# Patient Record
Sex: MALE | Race: BLACK OR AFRICAN AMERICAN | URBAN - METROPOLITAN AREA
[De-identification: names, ages, dates, MRNs, and addresses within clinical notes are randomized per-mention and may not be internally consistent; named-entity substitution may affect disease eponyms.]

---

## 2020-04-19 ENCOUNTER — EMERGENCY (EMERGENCY)
Facility: HOSPITAL | Age: 30
LOS: 1 days | Discharge: HOME | End: 2020-04-19
Attending: EMERGENCY MEDICINE | Admitting: EMERGENCY MEDICINE
Payer: COMMERCIAL

## 2020-04-19 VITALS
SYSTOLIC BLOOD PRESSURE: 108 MMHG | HEART RATE: 79 BPM | RESPIRATION RATE: 18 BRPM | OXYGEN SATURATION: 96 % | DIASTOLIC BLOOD PRESSURE: 57 MMHG | TEMPERATURE: 98 F

## 2020-04-19 VITALS
WEIGHT: 169.98 LBS | RESPIRATION RATE: 18 BRPM | TEMPERATURE: 98 F | SYSTOLIC BLOOD PRESSURE: 117 MMHG | OXYGEN SATURATION: 99 % | DIASTOLIC BLOOD PRESSURE: 76 MMHG | HEART RATE: 90 BPM | HEIGHT: 66 IN

## 2020-04-19 DIAGNOSIS — F43.20 ADJUSTMENT DISORDER, UNSPECIFIED: ICD-10-CM

## 2020-04-19 DIAGNOSIS — R45.851 SUICIDAL IDEATIONS: ICD-10-CM

## 2020-04-19 DIAGNOSIS — Z20.828 CONTACT WITH AND (SUSPECTED) EXPOSURE TO OTHER VIRAL COMMUNICABLE DISEASES: ICD-10-CM

## 2020-04-19 LAB
ALBUMIN SERPL ELPH-MCNC: 4.5 G/DL — SIGNIFICANT CHANGE UP (ref 3.3–5)
ALP SERPL-CCNC: 90 U/L — SIGNIFICANT CHANGE UP (ref 40–120)
ALT FLD-CCNC: 24 U/L — SIGNIFICANT CHANGE UP (ref 10–45)
AMPHET UR-MCNC: NEGATIVE — SIGNIFICANT CHANGE UP
ANION GAP SERPL CALC-SCNC: 13 MMOL/L — SIGNIFICANT CHANGE UP (ref 5–17)
APAP SERPL-MCNC: <5 UG/ML — LOW (ref 10–30)
APPEARANCE UR: CLEAR — SIGNIFICANT CHANGE UP
AST SERPL-CCNC: 28 U/L — SIGNIFICANT CHANGE UP (ref 10–40)
BARBITURATES UR SCN-MCNC: NEGATIVE — SIGNIFICANT CHANGE UP
BASOPHILS # BLD AUTO: 0.01 K/UL — SIGNIFICANT CHANGE UP (ref 0–0.2)
BASOPHILS NFR BLD AUTO: 0.2 % — SIGNIFICANT CHANGE UP (ref 0–2)
BENZODIAZ UR-MCNC: NEGATIVE — SIGNIFICANT CHANGE UP
BILIRUB SERPL-MCNC: 0.7 MG/DL — SIGNIFICANT CHANGE UP (ref 0.2–1.2)
BILIRUB UR-MCNC: NEGATIVE — SIGNIFICANT CHANGE UP
BUN SERPL-MCNC: 9 MG/DL — SIGNIFICANT CHANGE UP (ref 7–23)
CALCIUM SERPL-MCNC: 9.7 MG/DL — SIGNIFICANT CHANGE UP (ref 8.4–10.5)
CHLORIDE SERPL-SCNC: 102 MMOL/L — SIGNIFICANT CHANGE UP (ref 96–108)
CO2 SERPL-SCNC: 25 MMOL/L — SIGNIFICANT CHANGE UP (ref 22–31)
COCAINE METAB.OTHER UR-MCNC: NEGATIVE — SIGNIFICANT CHANGE UP
COLOR SPEC: YELLOW — SIGNIFICANT CHANGE UP
CREAT SERPL-MCNC: 0.8 MG/DL — SIGNIFICANT CHANGE UP (ref 0.5–1.3)
DIFF PNL FLD: NEGATIVE — SIGNIFICANT CHANGE UP
EOSINOPHIL # BLD AUTO: 0.19 K/UL — SIGNIFICANT CHANGE UP (ref 0–0.5)
EOSINOPHIL NFR BLD AUTO: 3.3 % — SIGNIFICANT CHANGE UP (ref 0–6)
ETHANOL SERPL-MCNC: <10 MG/DL — SIGNIFICANT CHANGE UP (ref 0–10)
GLUCOSE SERPL-MCNC: 98 MG/DL — SIGNIFICANT CHANGE UP (ref 70–99)
GLUCOSE UR QL: NEGATIVE — SIGNIFICANT CHANGE UP
HCT VFR BLD CALC: 44.8 % — SIGNIFICANT CHANGE UP (ref 39–50)
HGB BLD-MCNC: 15.4 G/DL — SIGNIFICANT CHANGE UP (ref 13–17)
IMM GRANULOCYTES NFR BLD AUTO: 0.2 % — SIGNIFICANT CHANGE UP (ref 0–1.5)
KETONES UR-MCNC: NEGATIVE — SIGNIFICANT CHANGE UP
LEUKOCYTE ESTERASE UR-ACNC: NEGATIVE — SIGNIFICANT CHANGE UP
LYMPHOCYTES # BLD AUTO: 1.97 K/UL — SIGNIFICANT CHANGE UP (ref 1–3.3)
LYMPHOCYTES # BLD AUTO: 34.3 % — SIGNIFICANT CHANGE UP (ref 13–44)
MCHC RBC-ENTMCNC: 30.3 PG — SIGNIFICANT CHANGE UP (ref 27–34)
MCHC RBC-ENTMCNC: 34.4 GM/DL — SIGNIFICANT CHANGE UP (ref 32–36)
MCV RBC AUTO: 88.2 FL — SIGNIFICANT CHANGE UP (ref 80–100)
METHADONE UR-MCNC: NEGATIVE — SIGNIFICANT CHANGE UP
MONOCYTES # BLD AUTO: 0.38 K/UL — SIGNIFICANT CHANGE UP (ref 0–0.9)
MONOCYTES NFR BLD AUTO: 6.6 % — SIGNIFICANT CHANGE UP (ref 2–14)
NEUTROPHILS # BLD AUTO: 3.19 K/UL — SIGNIFICANT CHANGE UP (ref 1.8–7.4)
NEUTROPHILS NFR BLD AUTO: 55.4 % — SIGNIFICANT CHANGE UP (ref 43–77)
NITRITE UR-MCNC: NEGATIVE — SIGNIFICANT CHANGE UP
NRBC # BLD: 0 /100 WBCS — SIGNIFICANT CHANGE UP (ref 0–0)
OPIATES UR-MCNC: NEGATIVE — SIGNIFICANT CHANGE UP
PCP SPEC-MCNC: SIGNIFICANT CHANGE UP
PCP UR-MCNC: NEGATIVE — SIGNIFICANT CHANGE UP
PH UR: 6.5 — SIGNIFICANT CHANGE UP (ref 5–8)
PLATELET # BLD AUTO: 257 K/UL — SIGNIFICANT CHANGE UP (ref 150–400)
POTASSIUM SERPL-MCNC: 3.9 MMOL/L — SIGNIFICANT CHANGE UP (ref 3.5–5.3)
POTASSIUM SERPL-SCNC: 3.9 MMOL/L — SIGNIFICANT CHANGE UP (ref 3.5–5.3)
PROT SERPL-MCNC: 7.4 G/DL — SIGNIFICANT CHANGE UP (ref 6–8.3)
PROT UR-MCNC: NEGATIVE MG/DL — SIGNIFICANT CHANGE UP
RBC # BLD: 5.08 M/UL — SIGNIFICANT CHANGE UP (ref 4.2–5.8)
RBC # FLD: 13.1 % — SIGNIFICANT CHANGE UP (ref 10.3–14.5)
SALICYLATES SERPL-MCNC: <0.3 MG/DL — LOW (ref 2.8–20)
SARS-COV-2 RNA SPEC QL NAA+PROBE: SIGNIFICANT CHANGE UP
SODIUM SERPL-SCNC: 140 MMOL/L — SIGNIFICANT CHANGE UP (ref 135–145)
SP GR SPEC: 1.01 — SIGNIFICANT CHANGE UP (ref 1–1.03)
THC UR QL: NEGATIVE — SIGNIFICANT CHANGE UP
UROBILINOGEN FLD QL: 0.2 E.U./DL — SIGNIFICANT CHANGE UP
WBC # BLD: 5.75 K/UL — SIGNIFICANT CHANGE UP (ref 3.8–10.5)
WBC # FLD AUTO: 5.75 K/UL — SIGNIFICANT CHANGE UP (ref 3.8–10.5)

## 2020-04-19 PROCEDURE — 80307 DRUG TEST PRSMV CHEM ANLYZR: CPT

## 2020-04-19 PROCEDURE — 81003 URINALYSIS AUTO W/O SCOPE: CPT

## 2020-04-19 PROCEDURE — 90792 PSYCH DIAG EVAL W/MED SRVCS: CPT | Mod: 95

## 2020-04-19 PROCEDURE — 85025 COMPLETE CBC W/AUTO DIFF WBC: CPT

## 2020-04-19 PROCEDURE — 99285 EMERGENCY DEPT VISIT HI MDM: CPT

## 2020-04-19 PROCEDURE — 80053 COMPREHEN METABOLIC PANEL: CPT

## 2020-04-19 PROCEDURE — 99053 MED SERV 10PM-8AM 24 HR FAC: CPT

## 2020-04-19 PROCEDURE — 36415 COLL VENOUS BLD VENIPUNCTURE: CPT

## 2020-04-19 PROCEDURE — 99285 EMERGENCY DEPT VISIT HI MDM: CPT | Mod: 25

## 2020-04-19 PROCEDURE — 96372 THER/PROPH/DIAG INJ SC/IM: CPT

## 2020-04-19 PROCEDURE — 93005 ELECTROCARDIOGRAM TRACING: CPT

## 2020-04-19 PROCEDURE — 87635 SARS-COV-2 COVID-19 AMP PRB: CPT

## 2020-04-19 PROCEDURE — 93010 ELECTROCARDIOGRAM REPORT: CPT

## 2020-04-19 RX ORDER — HALOPERIDOL DECANOATE 100 MG/ML
5 INJECTION INTRAMUSCULAR ONCE
Refills: 0 | Status: COMPLETED | OUTPATIENT
Start: 2020-04-19 | End: 2020-04-19

## 2020-04-19 RX ADMIN — HALOPERIDOL DECANOATE 5 MILLIGRAM(S): 100 INJECTION INTRAMUSCULAR at 05:00

## 2020-04-19 RX ADMIN — Medication 2 MILLIGRAM(S): at 05:08

## 2020-04-19 NOTE — ED BEHAVIORAL HEALTH ASSESSMENT NOTE - PSYCHIATRIC ISSUES AND PLAN (INCLUDE STANDING AND PRN MEDICATION)
consider SW consult to address housing issue. Unknown what he takes or whether he has been compliant. Can consider giving Risperdal 2MG PO once if pt consents and accepts it (avoid giving if pt receives any PRN antipsychotics in order to avoid side effects). If PRNs needed can consider Haldol 5MG q6h PRN agitation (PO/IM, keep QTC under 500), Ativan 2MG q6h PRN agitation (PO/IM), Benadryl 50MG q6h PRN EPS prevention (PO/IM) consider SW consult to address housing issue. Unknown what he takes or whether he has been compliant. Can consider giving Risperdal 2MG PO once if pt consents and accepts it (avoid giving if pt receives any PRN antipsychotics in order to avoid side effects) and cogentin 1MG PO once if pt consents and accepts. If PRNs needed can consider Haldol 5MG q6h PRN agitation (PO/IM, keep QTC under 500), Ativan 2MG q6h PRN agitation (PO/IM), Benadryl 50MG q6h PRN EPS prevention (PO/IM)

## 2020-04-19 NOTE — ED BEHAVIORAL HEALTH ASSESSMENT NOTE - HPI (INCLUDE ILLNESS QUALITY, SEVERITY, DURATION, TIMING, CONTEXT, MODIFYING FACTORS, ASSOCIATED SIGNS AND SYMPTOMS)
Pt is a 30yo M, undomiciled on the street, self reports PPhx schizophrenia, no known PMHx, BIB self with vague complaints and conflicting reports.     Interview difficult as pt refusing to answer many questions and complete the evaluation. Attempted several times, and also got on camera twice with both times pt refusing to answer further questions and refusing to complete the interview.     Pt reports he has had schizophrenia for several years. He reports that he came to the ER because he wanted paperwork to get disability. He reports having a psychiatrist but refusing to name psychiatrist, clinic, or location of clinic, or when his next appt is. He did report he saw the psychiatrist last time maybe a few weeks ago. He states he has no collateral contacts at all.   He denies depression. He denies current SI. He reports "food" as a protective factor and what he lives for. He denies SA or NSSI, but wouldn't clarify why he reported past SA to the ER nurse as per her note. He denies access to firearms or weapons.   He denies HI. He reports AH for the past few years, and says they have been the same. he denies CAH. He denies VH. He wouldn't really answer regarding PI, at first states no but then reports yes for past few years that's the same but wouldn't clarify the details.    He reports that he just wants to sleep in the bed because he had a hard time sleeping due to being homeless. When asked about his comment to ED team about wanting to sleep, he denies that being a suicidal statement and states he just wants to sleep in a bed. He reports wanting help to get shelter.     He refuses to answer any more questions and refuses to engage in the full assessment.

## 2020-04-19 NOTE — ED PROVIDER NOTE - PROGRESS NOTE DETAILS
pt was medically cleared for psychiatric eval. tele psych was consulted and will see the patient. Dispo pending psych eval. Caroline- pt uncooperative on multiple attempts at telepsych eval, now pacing around ED trying to leave.  code grey called and pt given haldol/ativan IM.  pt for psych hold/reeval Ree: pt received from JESE Velazquez and Dr. Lock at s/o; pt with c/o si though no plan. Given haldol/ ativan for agitation and now sleeping comfortably. Pt initially evaluated by telepsych and to be re-evaluated once more alert and awake. Will continue on 1:1 obs.

## 2020-04-19 NOTE — ED BEHAVIORAL HEALTH NOTE - BEHAVIORAL HEALTH NOTE
telepsych re-assessment at 10:45    Per initial evaluating psychiatrist, Dr. Ventura,   "Pt is a 30yo M, undomiciled on the street, self reports PPhx schizophrenia, no known PMHx, BIB self with vague complaints and conflicting reports. Interview difficult as pt refusing to answer many questions and complete the evaluation. Attempted several times, and also got on camera twice with both times pt refusing to answer further questions and refusing to complete the interview. Pt reports he has had schizophrenia for several years. He reports that he came to the ER because he wanted paperwork to get disability. He reports having a psychiatrist but refusing to name psychiatrist, clinic, or location of clinic, or when his next appt is. He did report he saw the psychiatrist last time maybe a few weeks ago. He states he has no collateral contacts at all.   He denies depression. He denies current SI. He reports "food" as a protective factor and what he lives for. He denies SA or NSSI, but wouldn't clarify why he reported past SA to the ER nurse as per her note. He denies access to firearms or weapons.   He denies HI. He reports AH for the past few years, and says they have been the same. he denies CAH. He denies VH. He wouldn't really answer regarding PI, at first states no but then reports yes for past few years that's the same but wouldn't clarify the details.He reports that he just wants to sleep in the bed because he had a hard time sleeping due to being homeless. When asked about his comment to ED team about wanting to sleep, he denies that being a suicidal statement and states he just wants to sleep in a bed. He reports wanting help to get shelter. He refuses to answer any more questions and refuses to engage in the full assessment."      Per PA, patient was medicated with haldol 5mg and Ativan 2mg IM injection around 5am due to irritability and has slept since and ate some breakfast prior to this telepsych re-assessment, and was sleeping for the most part after he got medication.  Patient was seen and evaluated, he is calm during assessment, appears to be sleepy, but is aaox3, able to answer questions.  He denied SI/HI/AVH at this time.  When confronted with giving inconsistent history since arrival to ED, he admits to only wanting a place to live and food to eat.  He denied paranoid, active psychosis, or vasu.  He is amendable to outpatient follow up and referral to shelter.    MSE:  Appearance:  stated age, fairly groomed, in hospital gown, medium build  Behavior:  cooperative  Eye contact:  fair  Gait and muscle strength:  deferred to ED provider  Movement:  no abnormal movement noted on evaluation  TP:  linear, goal directed, logical  TC:  denied SI/HI  Perceptual:  denied AVH, no delusion/psyhosis/PI noted on exam  Cognition:  aaox3  Intelligence:  average  Memory:  grossly intact  Insight:  fair on evaluation   Judgment:  fair on evaluation poor by history     Discharge diagnosis:  adjustment d/o unspecified, r/o substance use d/o and/or substance use induced mood/psychotic d/o    A/P  28 yo male with self reported history of schizophrenia, unclear h/o admission or psyhciatric treatment, denied prior SA/NSSIB/aggression, gave inconsistent history since arrival to ED.  Reports SI at triage, later, reported to various sources that he was seeking for shelter, food, or disability claim, and was not fully engaged in initial evaluation, medicated around 5am for irritability, pacing the hallway.  On re-assessment , patient is calm, aaox4, has no signs of self destructive behavior or agitation/psychosis/vasu, reports he does not have any SI/HI/AVH, but wants to get food and shelter.  He is advised that ED team may assist with above, patient denied any active substance use, is agreeable with getting outpatient psychiatric follow up information, and declined to participate in safety planning.   Patient does not meet criteria for inpatient admission at this time.    -Patient does not meet criteria for inpatient level of psychiatric care  - or ED team for possible shelter referral  -telepsych will send outpatient psychiatric resources for patient to follow up  -Advised to return to ED or call 911 for any suicidal ideation, homicidal thoughts or worsening symptoms. Patient cites understanding of instructions

## 2020-04-19 NOTE — ED PROVIDER NOTE - PATIENT PORTAL LINK FT
You can access the FollowMyHealth Patient Portal offered by [facility] by registering at the following website: [email or web address]. By joining [employer]’s FollowMyHealth portal, you will also be able to view your health information using other applications (apps) compatible with our system.

## 2020-04-19 NOTE — ED ADULT NURSE NOTE - REASON FOR REFERRAL
pt came into the ED stating wanting to hurt himself.  pt has denied any ideations of self harm.  pt states that he is homeless at this time.

## 2020-04-19 NOTE — ED BEHAVIORAL HEALTH ASSESSMENT NOTE - REASON FOR REFERRAL
Pt with mixed reports ( triage note includes SI, nurse note indicates pt denies SI but reports AH and reports compliance, provider notes noncompliance and desire to sleep)

## 2020-04-19 NOTE — ED PROVIDER NOTE - CLINICAL SUMMARY MEDICAL DECISION MAKING FREE TEXT BOX
29M with a h/o schizophrenia on Risperdal and cogentin, noncompliant x "a few weeks" who p/w suicidal thoughts and desire "to sleep." VSS, no focal neuro deficits or evidence of trauma. Pt is cooperative, placed on 1:1, labs and urine sent for psych clearance. Dispo pending telepsych eval. 29M with a h/o schizophrenia on Risperdal and cogentin, noncompliant x "a few weeks" who p/w suicidal thoughts and desire "to sleep." VSS, no focal neuro deficits or evidence of trauma. Pt is cooperative, placed on 1:1, labs and urine sent for psych clearance. Dispo pending telepsych eval.  Patient was eval by telepsych and clear to be discharge. No immediate threat to self or others. Pt is calm and cooperative.

## 2020-04-19 NOTE — ED BEHAVIORAL HEALTH ASSESSMENT NOTE - OTHER PAST PSYCHIATRIC HISTORY (INCLUDE DETAILS REGARDING ONSET, COURSE OF ILLNESS, INPATIENT/OUTPATIENT TREATMENT)
See HPI.   pt reports h/x of schizophrenia.   Pt refuses to answer any further questions, thus unknown whether has had any past psych admissions, and unknown who he is in outpatient with

## 2020-04-19 NOTE — ED PROVIDER NOTE - OBJECTIVE STATEMENT
29M with a h/o schizophrenia on Risperdal and cogentin, noncompliant x "a few weeks" who p/w suicidal thoughts and desire "to sleep." Pt states he has thoughts of hurting himself but does not have a plan. He states he has tried to hurt himself in the past but cannot provide details. He denies HI or hallucinations. he admits to drinking and taking an "e pill" today. No CP, SOB, n/v, ha, neck pain, f/c, or URI sx. no 29M with a h/o schizophrenia on Risperdal and cogentin, noncompliant x "a few weeks" who p/w suicidal thoughts and desire "to sleep." Pt states he has thoughts of hurting himself but does not have a plan. He states he has tried to hurt himself in the past but cannot provide details. He denies HI or hallucinations. He admits to drinking and taking an "e pill" today. No CP, SOB, n/v, ha, neck pain, f/c, or URI sx.

## 2020-04-19 NOTE — ED ADULT NURSE REASSESSMENT NOTE - CONDITION
improved/spoke with telepsych, consult will start momentarily, 1:1 maintained, given breakfast, pt denies any complaints at this time

## 2020-04-19 NOTE — ED BEHAVIORAL HEALTH ASSESSMENT NOTE - NS ED BHA BENZODIAZEPINES
MANTOUX TUBERCULIN (a.k.a. TB) SKIN TEST      What is Tuberculosis/TB?  Tuberculosis/TB is an infectious disease, which is spread through the air by tiny germs when people cough, sneeze, speak or sing. TB germs are very small and can remain in the air for a long period of time. TB germs most oft  en settle in your lungs, but may also settle in other organs of your body. TB germs can be present in your body without making you ill. This is called TB INFECTION. TB infection cannot be spread to other people. When your  body’s defenses become weak and can no longer control the TB germs they multiply, this is called TB DISEASE. It can take month(s) to year(s) for TB infection to become TB disease. The TB SKIN TEST can show if a person has  been “infected” by TB germs.    How is the Mantoux Tuberculin/TB skin test given?  A very small amount of a product called Purified Protein Derivative (PPD) is injected just under the top layer of the skin on the forearm. Persons who have been infected by the TB germs usually react to the test by developing swelling at the injection site. The skin test results must be read 48 to 72 hours after given. Failure to return within this time frame means the test will need to be repeated.    Side effects…  Side effects from a skin test are rare and may include itching and discomfort at the injection site and very rarely the possibility of a blister, ulceration or necrosis (dead tissue) at the site if the injection.  
None known

## 2020-04-19 NOTE — ED BEHAVIORAL HEALTH ASSESSMENT NOTE - CURRENT MEDICATION
unable to assess as pt refusing to answer questions.     Per chart review pt reported use of risperdal and cogentin (per nurses note pt reported compliance but per provider note pt reported noncompliance)

## 2020-04-19 NOTE — ED ADULT NURSE NOTE - HPI (INCLUDE ILLNESS QUALITY, SEVERITY, DURATION, TIMING, CONTEXT, MODIFYING FACTORS, ASSOCIATED SIGNS AND SYMPTOMS)
pt states that he as been hearing voices and that they (the voices in h is head) are saying bad things about himself. when asked if the pt had any ideations of self harm, pt denied any plans or thoughts of harming himself.  pt states that he has been taking his medications without missing any doses.  states increased voices over the past day or so.  when pt asked if he had any plans of harming himself or others, pt denied any plans at this time.  when talking to pt about his housing situation, pt states that he was homeless at this time.

## 2020-04-19 NOTE — ED PROVIDER NOTE - PHYSICAL EXAMINATION
GEN: Unkempt, appears homeless, Well developed, well nourished, awake, alert, oriented to person, place, time/situation and in no apparent distress. NTAF  ENT: Airway patent, Nasal mucosa clear. Mouth with normal mucosa.  EYES: injected conjunctiva bilaterally. PERRL, EOMI  RESPIRATORY: Breathing comfortably with normal RR. No W/C/R, no hypoxia or resp distress.  CARDIAC: Regular rate and rhythm, no M/R/G  ABDOMEN: Soft, nontender, +bowel sounds, no rebound, rigidity, or guarding.  MSK: Range of motion is not limited, no deformities noted.  NEURO: Alert and oriented, no focal deficits.  SKIN: Skin normal color for race, warm, dry and intact. No evidence of rash.  PSYCH: Alert and oriented to person, place, time/situation. flat mood and affect. no apparent risk to self or others.

## 2020-04-19 NOTE — ED ADULT NURSE REASSESSMENT NOTE - CONDITION
VSS, pt arousable and speaks 2-3 sentences but then falls back asleep, able to reposition self in bed; pt oriented x3; continue to pend telepsych consult/unchanged

## 2020-04-19 NOTE — ED BEHAVIORAL HEALTH ASSESSMENT NOTE - SUBSTANCE ISSUES AND PLAN (INCLUDE STANDING AND PRN MEDICATION)
defer to ED team. consider CIWA and monitoring for withdrawal, since per chart review pt mentioned drinking to provider but pt refused to answer questions regarding substances to me thus unknown how much he has been drinking or whether he is at risk of withdrawal

## 2020-04-19 NOTE — ED BEHAVIORAL HEALTH ASSESSMENT NOTE - SUMMARY
Pt is a 30yo M, undomiciled on the street, self reports PPhx schizophrenia, no known PMHx, BIB self with vague complaints and conflicting reports.     Currently denies SI/HI, but pt refusing to answer further questions and complete the full evaluation.   Although suspect possible malingering 2/2 homelessness, unable to determine full picture and give dispo given pt refusing to answer further questions despite multiple attempts.   Unfortunately will have to hold for reassessment. Pt is a 30yo M, undomiciled on the street, self reports PPhx schizophrenia, no known PMHx, BIB self with vague complaints and conflicting reports.     Currently denies SI/HI, but pt refusing to answer further questions and complete the full evaluation.   Although suspect possible malingering 2/2 homelessness, unable to determine full picture and give dispo given pt refusing to answer further questions despite multiple attempts. Pt also increasingly irritable on exam.   Unfortunately will have to hold for reassessment. Pt is a 30yo M, undomiciled on the street, self reports PPhx schizophrenia, no known PMHx, BIB self with vague complaints and conflicting reports.     Currently denies SI/HI, but pt refusing to answer further questions and complete the full evaluation.   Although suspect possible malingering 2/2 homelessness, unable to determine full picture and give dispo given pt refusing to answer further questions despite multiple attempts. Pt also increasingly irritable on exam.   Unclear why pt refusing to answer further questions and finish the evaluation (unclear if just due to pt wanting to sleep or leave and just not wanting to engage further, versus substance withdrawal leading to irritability, versus psychosis with PI).  Unfortunately will have to hold for reassessment.

## 2020-04-19 NOTE — ED BEHAVIORAL HEALTH ASSESSMENT NOTE - DESCRIPTION (FIRST USE, LAST USE, QUANTITY, FREQUENCY, DURATION)
unable to assess as pt refusing to answer questions. per chart review the provider note did note pt reporting ETOH use unable to assess as pt refusing to answer questions. per chart review the provider note did note pt reporting "e" pill use.. unable to determine whether has used anything else since he refuses to answer questions

## 2020-04-19 NOTE — ED BEHAVIORAL HEALTH NOTE - BEHAVIORAL HEALTH NOTE
===================  PRE-HOSPITAL COURSE  ===================    SOURCE:  Chart    DETAILS: Walk-in    ============  ED COURSE   ============    SOURCE:  ED, Chart    ARRIVAL: walk-in    BELONGINGS:  No items of note    BEHAVIOR: Patient arrived alert and oriented x3, he appears to be homeless. Patient was cooperative with ED medical and safety protocols. Patient initially reported SI to triage but later had conflicting statements. Patient also reported and then denied AH at different times per chart. Patient’s affect appeared flat and is sleepy/lethargic in the ED. Patient slept a large portion of ED course. Patient remained in good behavioral control prior to assessment. After multiple attempts at assessment and patient being uncooperative with interview he began to pace and attempt to leave ED, code grey was called and he had to be medicated.     TREATMENT: Haldol 5mg IM, Ativan 2mg IM ~500    VISITORS: None    ========================  COLLATERAL  ========================    None available at this time

## 2020-10-15 ENCOUNTER — EMERGENCY (EMERGENCY)
Facility: HOSPITAL | Age: 30
LOS: 1 days | Discharge: HOME | End: 2020-10-15
Admitting: EMERGENCY MEDICINE
Payer: SELF-PAY

## 2020-10-15 VITALS
WEIGHT: 154.98 LBS | HEIGHT: 66 IN | OXYGEN SATURATION: 99 % | RESPIRATION RATE: 17 BRPM | DIASTOLIC BLOOD PRESSURE: 62 MMHG | HEART RATE: 75 BPM | TEMPERATURE: 98 F | SYSTOLIC BLOOD PRESSURE: 113 MMHG

## 2020-10-15 DIAGNOSIS — Z00.8 ENCOUNTER FOR OTHER GENERAL EXAMINATION: ICD-10-CM

## 2020-10-15 PROCEDURE — 99283 EMERGENCY DEPT VISIT LOW MDM: CPT

## 2020-10-15 PROCEDURE — 99053 MED SERV 10PM-8AM 24 HR FAC: CPT

## 2020-10-15 RX ORDER — NYSTATIN CREAM 100000 [USP'U]/G
1 CREAM TOPICAL ONCE
Refills: 0 | Status: DISCONTINUED | OUTPATIENT
Start: 2020-10-15 | End: 2020-10-18

## 2020-10-15 NOTE — ED PROVIDER NOTE - OBJECTIVE STATEMENT
30 year old male with no PMHx presents with foot fungus requesting foot cream. no other complaints  Denies sore throat, cough, SOB, CP, palpitations, wheezing, abdominal pain, N/V/D/C, change in urinary/bowel function, dysuria, hematuria, flank pain, malaise, rash, HA, and dizziness.  No recent travel or sick contact noted.

## 2020-10-15 NOTE — ED ADULT NURSE NOTE - CHPI ED NUR SYMPTOMS NEG
no dizziness/no weakness/no chills/no fever/no pain/no vomiting/no nausea/no tingling/no decreased eating/drinking

## 2020-10-15 NOTE — ED PROVIDER NOTE - PATIENT PORTAL LINK FT
You can access the FollowMyHealth Patient Portal offered by [facility] by registering at the following website: [email or web address]. By joining [employer]’s FollowMyHealth portal, you will also be able to view your health information using other applications (apps) compatible with our system.

## 2020-10-15 NOTE — ED PROVIDER NOTE - NSFOLLOWUPINSTRUCTIONS_ED_ALL_ED_FT
use cream twice a day. keep feet clean and dry    1)  PLEASE FOLLOW-UP WITH YOUR PRIMARY CARE DOCTOR OR REFERRED SPECIALIST IN 1-2 DAYS.     2)  BRING ALL PAPERWORK FROM TODAY'S EMERGENCY ROOM  VISIT TO YOUR FOLLOW-UP VISIT.  IF YOU DO NOT HAVE A PRIMARY CARE DOCTOR PLEASE REFER TO THE OFFICE/CLINIC INFORMATION GIVEN ABOVE.  YOU MAY ALSO CALL [phone] AND ASK FOR MS. LAUREN PIERRE.  SHE CAN HELP YOU MAKE A FOLLOW-UP APPOINTMENT.  HER HOURS ARE 11AM-7PM MONDAY - FRIDAY.    3) PLEASE RETURN TO THE ER IMMEDIATELY OR CALL 911 FOR ANY HIGH FEVER, TROUBLE BREATHING, CHEST PAIN, WEAKNESS, VOMITING, SEVERE PAIN, OR ANY OTHER CONCERNS.

## 2020-10-15 NOTE — ED PROVIDER NOTE - PHYSICAL EXAMINATION
GEN: Well appearing, well nourished, awake, alert, oriented to person, place, time/situation and in no apparent distress.  ENT: Airway patent, Nasal mucosa clear. Mouth with normal mucosa.  EYES: Clear bilaterally.  RESPIRATORY: Breathing comfortably with normal RR.  MSK: Range of motion is not limited, no deformities noted.  patient does not want me to examine feet  NEURO: Alert and oriented, no focal deficits.  SKIN: Skin normal color for race, warm, dry and intact. No evidence of rash.  PSYCH: Alert and oriented to person, place, time/situation. normal mood and affect. no apparent risk to self or others.

## 2020-10-15 NOTE — ED PROVIDER NOTE - NS ED ROS FT
· CONSTITUTIONAL: no fever and no chills.  · CARDIOVASCULAR: normal rate and rhythm, no chest pain and no edema.  · RESPIRATORY: no chest pain, no cough, and no shortness of breath.  · GASTROINTESTINAL: no abdominal pain, no bloating, no constipation, no diarrhea, no nausea and no vomiting.  · MUSCULOSKELETAL: no back pain, no musculoskeletal pain, no neck pain, and no weakness. +foot fungus  · SKIN: no abrasions, no jaundice, no lesions, no pruritis, and no rashes.  · NEURO: no loss of consciousness, no gait abnormality, no headache, no sensory deficits, and no weakness.  · PSYCHIATRIC: no known mental health issues. I have personally evaluated and examined the patient. The Attending was available to me as a supervising provider if needed.

## 2023-03-30 ENCOUNTER — EMERGENCY (EMERGENCY)
Facility: HOSPITAL | Age: 33
LOS: 1 days | End: 2023-03-30
Payer: SELF-PAY

## 2023-03-30 ENCOUNTER — EMERGENCY (EMERGENCY)
Facility: HOSPITAL | Age: 33
LOS: 1 days | Discharge: PSYCHIATRIC OR SUBSTANCE USE FACILITY | End: 2023-03-30
Attending: EMERGENCY MEDICINE
Payer: SELF-PAY

## 2023-03-30 ENCOUNTER — EMERGENCY (EMERGENCY)
Facility: HOSPITAL | Age: 33
LOS: 1 days | Discharge: HOME | End: 2023-03-30
Attending: EMERGENCY MEDICINE | Admitting: EMERGENCY MEDICINE
Payer: SELF-PAY

## 2023-03-30 VITALS
HEART RATE: 82 BPM | SYSTOLIC BLOOD PRESSURE: 132 MMHG | RESPIRATION RATE: 20 BRPM | DIASTOLIC BLOOD PRESSURE: 70 MMHG | HEIGHT: 67 IN | TEMPERATURE: 98 F | OXYGEN SATURATION: 100 % | WEIGHT: 169.98 LBS

## 2023-03-30 VITALS
SYSTOLIC BLOOD PRESSURE: 132 MMHG | OXYGEN SATURATION: 97 % | WEIGHT: 199.96 LBS | RESPIRATION RATE: 18 BRPM | HEART RATE: 88 BPM | DIASTOLIC BLOOD PRESSURE: 89 MMHG | TEMPERATURE: 98 F | HEIGHT: 66 IN

## 2023-03-30 DIAGNOSIS — R51.9 HEADACHE, UNSPECIFIED: ICD-10-CM

## 2023-03-30 RX ORDER — METOCLOPRAMIDE HCL 10 MG
10 TABLET ORAL ONCE
Refills: 0 | Status: COMPLETED | OUTPATIENT
Start: 2023-03-30 | End: 2023-03-30

## 2023-03-30 RX ORDER — SODIUM CHLORIDE 9 MG/ML
1000 INJECTION INTRAMUSCULAR; INTRAVENOUS; SUBCUTANEOUS ONCE
Refills: 0 | Status: COMPLETED | OUTPATIENT
Start: 2023-03-30 | End: 2023-03-30

## 2023-03-30 RX ORDER — ACETAMINOPHEN 500 MG
1000 TABLET ORAL ONCE
Refills: 0 | Status: COMPLETED | OUTPATIENT
Start: 2023-03-30 | End: 2023-03-30

## 2023-03-30 RX ADMIN — SODIUM CHLORIDE 1000 MILLILITER(S): 9 INJECTION INTRAMUSCULAR; INTRAVENOUS; SUBCUTANEOUS at 23:57

## 2023-03-30 NOTE — ED PROVIDER NOTE - ATTENDING CONTRIBUTION TO CARE
Attending note (Tavo): 32-year-old male with a history of schizophrenia, presenting with fatigue, congestion, sore throat and headache.  No neck stiffness.  No rashes.  Patient reportedly was at a gas station today told an attendant that he did not "feel well," and  called 911.  EMS brought patient here for evaluation.  Patient was evaluated earlier this afternoon with similar symptoms at this ED of note a head CT which showed no acute actionable findings and RVP which was negative.  On during my interview patient reports that he has no acute complaints.  States he has nowhere to stay.  Patient with depressed/flat affect at times evasive with answers requiring repeated prompting by examiner to obtain answers.  However patient is not displaying flight of ideas or tangential thought process.  Does not appear to be responding to internal stimuli.  Denies AH or VH.  Denies SI or HI.  Per review of EMR had visits today: This afternoon at [facility] and early a.m. at [facility] for seemingly similar symptoms (unable to visualize ED provider documentation due to EMR downtime).  Given prior ED visits, history, and exam (no acute distress, no nuchal rigidity and not suspicious for meningitis, afebrile and otherwise not appearing septic) I am most concerned for possible decompensated underlying psychiatric condition.  While not actively psychotic nor endorsing suicidality, remain concerned given disorganization and repeat ED visits within 24 hours.  Will obtain screening labs and consult psychiatry.  Disposition pending review of psychiatric recommendations.

## 2023-03-30 NOTE — ED PROVIDER NOTE - ATTENDING CONTRIBUTION TO CARE
attending Zulay: 32yM no PMH p/w headache since this morning. Denies trauma. No similar h/o headaches in the past. Nausea without vomiting. Denies sick contacts, fever, chills, neck stiffness or pain, rash, focal weakness, vision changes. +light sensitivity and nasal congestion. Exam as in paper chart. Will obtain CTH given new onset headache, RVP, symptomatic treatment, reassess

## 2023-03-30 NOTE — ED ADULT NURSE NOTE - OBJECTIVE STATEMENT
33yo M w/ PMH schizophrenia, presents to ED via EMS c/o headache and throat pain. As per EMS, pt was at the gas station, and the  called the ambulance because the pt stated that he didn't feel well. Pt states, "I feel weak and my head and throat hurt". Pt states he was at [employer] then walked over to the gas station. Pt denies SI/HI and auditory or visual hallucinations, and is compliant with medications. Denying any other complaints at this time. A&Ox3. Strong peripheral pulses. Abdomen soft, nondistended, nontender to palpation. Neurologically intact and follows commands. Pulse motor and sensation present and equal to all 4 extremities. Skin warm dry intact and normal for ethnicity. Ambulatory with steady in ED. Stretcher locked and in lowest position, appropriate side rails up. Pt instructed to notify RN if assistance is needed.

## 2023-03-30 NOTE — ED ADULT NURSE NOTE - ED STAT RN HANDOFF DETAILS
Report given pallavi BARRETO RN. patient is in no acute distress. Patient vital signs stable, plan of care explained.

## 2023-03-30 NOTE — ED PROVIDER NOTE - PHYSICAL EXAMINATION
GENERAL: Awake. Alert. NAD. Well nourished.  HEENT: NC/AT, PERRL, EOMI, Conjunctiva pink, no scleral icterus. Airway patent. Moist mucous membranes.  LUNGS: CTAB. No wheezes or rales noted.  CARDIAC: Chest non-tender to palpation. RRR.  ABDOMEN: No masses noted. Soft, NT, ND, no rebound, no guarding.  EXT: No edema, no calf tenderness, distal pulses 2+ bilaterally  NEURO: A&Ox3. Moving all extremities. Sensation and strength intact throughout.   SKIN: Warm and dry.  PSYCH: Normal affect.

## 2023-03-30 NOTE — ED ADULT TRIAGE NOTE - CHIEF COMPLAINT QUOTE
headache. pt had  call EMS for him stating "I don't feel well. I have a headache." Per EMS, denying SI/HI.

## 2023-03-30 NOTE — ED PROVIDER NOTE - OBJECTIVE STATEMENT
32M PMH Schizophrenia presenting to the emergency department by EMS complaining of sore throat, cough, and myalgias.  As per EMS patient was found wandering at a gas station. 32M PMH Schizophrenia presenting to the emergency department by EMS complaining of generalized headache, sore throat, cough, and myalgias.  As per EMS patient was found wandering at a gas station. patient was also here at [place] for similar complaints this morning, patient brought in by EMS and was wandering the streets at that time.  Upon further chart review patient also seen at [place] for similar complaints.  Upon questioning, patient has no hallucinations, SI, HI, anxiety, depression.  Patient answering questions with yes or no, does not elaborate further.  Patient appears evasive.  Patient states he is confused and does not know where he lives.  Upon further chart review it appears that patient is undomiciled.  Patient denies alcohol use, drug use, smoking.  Patient denies chest pain, SOB, nausea, vomiting, abdominal pain, back pain.

## 2023-03-30 NOTE — ED PROVIDER NOTE - PROGRESS NOTE DETAILS
Nannette Herrera DO (PGY2); Spoke with psych, will eval pt via telepsych. Case discussed with telepsych attending who reports being unable to obtain collateral information or to get sufficient information from patient via telepsych interface so as to determine safety for discharge or need for inpatient admission.  Patient to be deferred to the day psychiatry team for further evaluation.  Telepsych attending recommended offering Haldol and Cogentin as patient reportedly was requesting medication from her. Jaleel Del Rio MD PGY1: Pt signed out to me, 32M undomiciled seen here yd and [facility] w/headache presenting for ha, sore throat, malaise. Labs obtained, RVP yhitesh neg, seen by telepsych and rec'd for day psych eval. Dispo pending day psych recs. Gretchen Jacobo MD  Pt signed out to me by Dr. Vo: 32M undomiciled [facility], this is the 3rd visit tot he ED w/headache presenting for ha, sore throat, malaise. Labs obtained, RVP yd neg, seen by telepsych and rec'd for day psych eval. Dispo pending day psych recs. At present the patient is sleeping after receiving PO Haldol and PO cogentin. I pged psych at . Jaleel Del Rio MD PGY1: Discussed pt with psych, to be involuntary admit. Psych to determine location and service of admission with sw. Jaleel Del Rio MD PGY1: Discussed pt with psych, to be involuntary admit. Psych to determine location and service of admission with sw. Medically stable for psych transport and placement.

## 2023-03-30 NOTE — ED PROVIDER NOTE - PATIENT PORTAL LINK FT
You can access the FollowMyHealth Patient Portal offered by [facility] by registering at the following website: [email or web address]. By joining [employer]’s FollowMyHealth portal, you will also be able to view your health information using other applications (apps) compatible with our system.

## 2023-03-30 NOTE — ED PROVIDER NOTE - CLINICAL SUMMARY MEDICAL DECISION MAKING FREE TEXT BOX
32M PMH Schizophrenia presenting to the emergency department by EMS complaining of generalized headache, sore throat, cough, and myalgias.  As per EMS patient was found wandering at a gas station. Given hx and physical, ddx includes but is not limited to schizophrenia, URI, viral syndrome, flu, COVID, tension headache.  Plan for labs, meds, psych, reassess.

## 2023-03-31 ENCOUNTER — INPATIENT (INPATIENT)
Facility: HOSPITAL | Age: 33
LOS: 20 days | Discharge: HOME | End: 2023-04-21
Attending: PSYCHIATRY & NEUROLOGY | Admitting: PSYCHIATRY & NEUROLOGY
Payer: MEDICAID

## 2023-03-31 VITALS
RESPIRATION RATE: 17 BRPM | HEART RATE: 83 BPM | TEMPERATURE: 98 F | DIASTOLIC BLOOD PRESSURE: 67 MMHG | OXYGEN SATURATION: 97 % | SYSTOLIC BLOOD PRESSURE: 103 MMHG

## 2023-03-31 VITALS — TEMPERATURE: 96 F | RESPIRATION RATE: 18 BRPM | HEIGHT: 68 IN | OXYGEN SATURATION: 98 % | WEIGHT: 199.96 LBS

## 2023-03-31 DIAGNOSIS — F20.9 SCHIZOPHRENIA, UNSPECIFIED: ICD-10-CM

## 2023-03-31 LAB
ALBUMIN SERPL ELPH-MCNC: 4.3 G/DL — SIGNIFICANT CHANGE UP (ref 3.3–5)
ALP SERPL-CCNC: 100 U/L — SIGNIFICANT CHANGE UP (ref 40–120)
ALT FLD-CCNC: 21 U/L — SIGNIFICANT CHANGE UP (ref 10–45)
AMPHET UR-MCNC: NEGATIVE — SIGNIFICANT CHANGE UP
APAP SERPL-MCNC: <15 UG/ML — SIGNIFICANT CHANGE UP (ref 10–30)
APPEARANCE UR: CLEAR — SIGNIFICANT CHANGE UP
AST SERPL-CCNC: 19 U/L — SIGNIFICANT CHANGE UP (ref 10–40)
BACTERIA # UR AUTO: NEGATIVE — SIGNIFICANT CHANGE UP
BARBITURATES UR SCN-MCNC: NEGATIVE — SIGNIFICANT CHANGE UP
BASOPHILS # BLD AUTO: 0.02 K/UL — SIGNIFICANT CHANGE UP (ref 0–0.2)
BASOPHILS NFR BLD AUTO: 0.3 % — SIGNIFICANT CHANGE UP (ref 0–2)
BENZODIAZ UR-MCNC: NEGATIVE — SIGNIFICANT CHANGE UP
BILIRUB SERPL-MCNC: 0.5 MG/DL — SIGNIFICANT CHANGE UP (ref 0.2–1.2)
BILIRUB UR-MCNC: NEGATIVE — SIGNIFICANT CHANGE UP
BUN SERPL-MCNC: 12 MG/DL — SIGNIFICANT CHANGE UP (ref 7–23)
CALCIUM SERPL-MCNC: 9.6 MG/DL — SIGNIFICANT CHANGE UP (ref 8.4–10.5)
CHLORIDE SERPL-SCNC: 108 MMOL/L — SIGNIFICANT CHANGE UP (ref 96–108)
CO2 SERPL-SCNC: 26 MMOL/L — SIGNIFICANT CHANGE UP (ref 22–31)
COCAINE METAB.OTHER UR-MCNC: NEGATIVE — SIGNIFICANT CHANGE UP
COLOR SPEC: YELLOW — SIGNIFICANT CHANGE UP
CREAT SERPL-MCNC: 0.98 MG/DL — SIGNIFICANT CHANGE UP (ref 0.5–1.3)
DIFF PNL FLD: NEGATIVE — SIGNIFICANT CHANGE UP
EGFR: 106 ML/MIN/1.73M2 — SIGNIFICANT CHANGE UP
EOSINOPHIL # BLD AUTO: 0.25 K/UL — SIGNIFICANT CHANGE UP (ref 0–0.5)
EOSINOPHIL NFR BLD AUTO: 4.3 % — SIGNIFICANT CHANGE UP (ref 0–6)
EPI CELLS # UR: 0 /HPF — SIGNIFICANT CHANGE UP
ETHANOL SERPL-MCNC: <10 MG/DL — SIGNIFICANT CHANGE UP (ref 0–10)
FLUAV AG NPH QL: SIGNIFICANT CHANGE UP
FLUBV AG NPH QL: SIGNIFICANT CHANGE UP
GLUCOSE SERPL-MCNC: 85 MG/DL — SIGNIFICANT CHANGE UP (ref 70–99)
GLUCOSE UR QL: NEGATIVE — SIGNIFICANT CHANGE UP
HCT VFR BLD CALC: 47.9 % — SIGNIFICANT CHANGE UP (ref 39–50)
HGB BLD-MCNC: 16.1 G/DL — SIGNIFICANT CHANGE UP (ref 13–17)
HYALINE CASTS # UR AUTO: 1 /LPF — SIGNIFICANT CHANGE UP (ref 0–2)
KETONES UR-MCNC: NEGATIVE — SIGNIFICANT CHANGE UP
LEUKOCYTE ESTERASE UR-ACNC: NEGATIVE — SIGNIFICANT CHANGE UP
LYMPHOCYTES # BLD AUTO: 1.98 K/UL — SIGNIFICANT CHANGE UP (ref 1–3.3)
LYMPHOCYTES # BLD AUTO: 34 % — SIGNIFICANT CHANGE UP (ref 13–44)
MAGNESIUM SERPL-MCNC: 2.1 MG/DL — SIGNIFICANT CHANGE UP (ref 1.6–2.6)
MCHC RBC-ENTMCNC: 29.2 PG — SIGNIFICANT CHANGE UP (ref 27–34)
MCHC RBC-ENTMCNC: 33.6 GM/DL — SIGNIFICANT CHANGE UP (ref 32–36)
MCV RBC AUTO: 86.8 FL — SIGNIFICANT CHANGE UP (ref 80–100)
METHADONE UR-MCNC: NEGATIVE — SIGNIFICANT CHANGE UP
MONOCYTES # BLD AUTO: 0.24 K/UL — SIGNIFICANT CHANGE UP (ref 0–0.9)
MONOCYTES NFR BLD AUTO: 4.1 % — SIGNIFICANT CHANGE UP (ref 2–14)
NEUTROPHILS # BLD AUTO: 3.34 K/UL — SIGNIFICANT CHANGE UP (ref 1.8–7.4)
NEUTROPHILS NFR BLD AUTO: 57.3 % — SIGNIFICANT CHANGE UP (ref 43–77)
NITRITE UR-MCNC: NEGATIVE — SIGNIFICANT CHANGE UP
NRBC # BLD: 0 /100 WBCS — SIGNIFICANT CHANGE UP (ref 0–0)
OPIATES UR-MCNC: NEGATIVE — SIGNIFICANT CHANGE UP
OXYCODONE UR-MCNC: NEGATIVE — SIGNIFICANT CHANGE UP
PCP SPEC-MCNC: SIGNIFICANT CHANGE UP
PCP UR-MCNC: NEGATIVE — SIGNIFICANT CHANGE UP
PH UR: 7 — SIGNIFICANT CHANGE UP (ref 5–8)
PLATELET # BLD AUTO: 266 K/UL — SIGNIFICANT CHANGE UP (ref 150–400)
POTASSIUM SERPL-MCNC: 3.6 MMOL/L — SIGNIFICANT CHANGE UP (ref 3.5–5.3)
POTASSIUM SERPL-SCNC: 3.6 MMOL/L — SIGNIFICANT CHANGE UP (ref 3.5–5.3)
PROT SERPL-MCNC: 6.8 G/DL — SIGNIFICANT CHANGE UP (ref 6–8.3)
PROT UR-MCNC: NEGATIVE — SIGNIFICANT CHANGE UP
RBC # BLD: 5.52 M/UL — SIGNIFICANT CHANGE UP (ref 4.2–5.8)
RBC # FLD: 13.8 % — SIGNIFICANT CHANGE UP (ref 10.3–14.5)
RBC CASTS # UR COMP ASSIST: 0 /HPF — SIGNIFICANT CHANGE UP (ref 0–4)
RSV RNA NPH QL NAA+NON-PROBE: SIGNIFICANT CHANGE UP
SALICYLATES SERPL-MCNC: <2 MG/DL — LOW (ref 15–30)
SARS-COV-2 RNA SPEC QL NAA+PROBE: SIGNIFICANT CHANGE UP
SODIUM SERPL-SCNC: 144 MMOL/L — SIGNIFICANT CHANGE UP (ref 135–145)
SP GR SPEC: 1.02 — SIGNIFICANT CHANGE UP (ref 1.01–1.02)
THC UR QL: NEGATIVE — SIGNIFICANT CHANGE UP
TSH SERPL-MCNC: 0.63 UIU/ML — SIGNIFICANT CHANGE UP (ref 0.27–4.2)
UROBILINOGEN FLD QL: ABNORMAL
WBC # BLD: 5.83 K/UL — SIGNIFICANT CHANGE UP (ref 3.8–10.5)
WBC # FLD AUTO: 5.83 K/UL — SIGNIFICANT CHANGE UP (ref 3.8–10.5)
WBC UR QL: 0 /HPF — SIGNIFICANT CHANGE UP (ref 0–5)

## 2023-03-31 RX ORDER — HALOPERIDOL DECANOATE 100 MG/ML
5 INJECTION INTRAMUSCULAR EVERY 4 HOURS
Refills: 0 | Status: DISCONTINUED | OUTPATIENT
Start: 2023-03-31 | End: 2023-04-21

## 2023-03-31 RX ORDER — BENZTROPINE MESYLATE 1 MG
0.5 TABLET ORAL ONCE
Refills: 0 | Status: COMPLETED | OUTPATIENT
Start: 2023-03-31 | End: 2023-03-31

## 2023-03-31 RX ORDER — HALOPERIDOL DECANOATE 100 MG/ML
5 INJECTION INTRAMUSCULAR ONCE
Refills: 0 | Status: DISCONTINUED | OUTPATIENT
Start: 2023-03-31 | End: 2023-04-21

## 2023-03-31 RX ORDER — HALOPERIDOL DECANOATE 100 MG/ML
5 INJECTION INTRAMUSCULAR
Refills: 0 | Status: DISCONTINUED | OUTPATIENT
Start: 2023-03-31 | End: 2023-03-31

## 2023-03-31 RX ORDER — HYDROXYZINE HCL 10 MG
50 TABLET ORAL EVERY 6 HOURS
Refills: 0 | Status: DISCONTINUED | OUTPATIENT
Start: 2023-03-31 | End: 2023-04-21

## 2023-03-31 RX ORDER — HALOPERIDOL DECANOATE 100 MG/ML
5 INJECTION INTRAMUSCULAR ONCE
Refills: 0 | Status: COMPLETED | OUTPATIENT
Start: 2023-03-31 | End: 2023-03-31

## 2023-03-31 RX ORDER — DIPHENHYDRAMINE HCL 50 MG
50 CAPSULE ORAL ONCE
Refills: 0 | Status: DISCONTINUED | OUTPATIENT
Start: 2023-03-31 | End: 2023-04-21

## 2023-03-31 RX ORDER — DIPHENHYDRAMINE HCL 50 MG
50 CAPSULE ORAL EVERY 4 HOURS
Refills: 0 | Status: DISCONTINUED | OUTPATIENT
Start: 2023-03-31 | End: 2023-04-21

## 2023-03-31 RX ORDER — LANOLIN ALCOHOL/MO/W.PET/CERES
3 CREAM (GRAM) TOPICAL AT BEDTIME
Refills: 0 | Status: DISCONTINUED | OUTPATIENT
Start: 2023-03-31 | End: 2023-04-21

## 2023-03-31 RX ORDER — NICOTINE POLACRILEX 2 MG
2 GUM BUCCAL
Refills: 0 | Status: DISCONTINUED | OUTPATIENT
Start: 2023-03-31 | End: 2023-04-21

## 2023-03-31 RX ORDER — BENZTROPINE MESYLATE 1 MG
0.5 TABLET ORAL
Refills: 0 | Status: DISCONTINUED | OUTPATIENT
Start: 2023-03-31 | End: 2023-03-31

## 2023-03-31 RX ORDER — RISPERIDONE 4 MG/1
2 TABLET ORAL AT BEDTIME
Refills: 0 | Status: DISCONTINUED | OUTPATIENT
Start: 2023-03-31 | End: 2023-04-02

## 2023-03-31 RX ADMIN — Medication 10 MILLIGRAM(S): at 00:04

## 2023-03-31 RX ADMIN — Medication 400 MILLIGRAM(S): at 00:03

## 2023-03-31 RX ADMIN — RISPERIDONE 2 MILLIGRAM(S): 4 TABLET ORAL at 20:59

## 2023-03-31 RX ADMIN — Medication 0.5 MILLIGRAM(S): at 06:55

## 2023-03-31 RX ADMIN — HALOPERIDOL DECANOATE 5 MILLIGRAM(S): 100 INJECTION INTRAMUSCULAR at 06:55

## 2023-03-31 NOTE — BH INPATIENT PSYCHIATRY ASSESSMENT NOTE - NSBHATTESTCOMMENTATTENDFT_PSY_A_CORE
The patient presents with disorganized thoughts and paranoia.  He is a poor historian.  He denies any suicidal ideation and is calm on the unit.  He is agreeable to restarting Risperdal.

## 2023-03-31 NOTE — BH INPATIENT PSYCHIATRY ASSESSMENT NOTE - NSBHCHARTREVIEWVS_PSY_A_CORE FT
Vital Signs Last 24 Hrs  T(C): --  T(F): --  HR: --  BP: --  BP(mean): --  RR: --  SpO2: --     Vital Signs Last 24 Hrs  T(C): 35.8 (03-31-23 @ 18:58), Max: 35.8 (03-31-23 @ 18:58)  T(F): 96.5 (03-31-23 @ 18:58), Max: 96.5 (03-31-23 @ 18:58)  HR: --  BP: --  BP(mean): --  RR: 18 (03-31-23 @ 18:58) (18 - 18)  SpO2: 98% (03-31-23 @ 18:58) (98% - 98%)    Orthostatic VS  03-31-23 @ 18:58  Lying BP: --/-- HR: --  Sitting BP: 123/62 HR: 78  Standing BP: 126/84 HR: 88  Site: --  Mode: --

## 2023-03-31 NOTE — ED BEHAVIORAL HEALTH NOTE - BEHAVIORAL HEALTH NOTE
Pt received after being cleared from medicine, pt is to be admitted as a 2PC after being eveluated by psychiatry. Pt  was received asleep by RN, Pt room was inspected, all items as per constant observation protocol was removed, pt wanded by security, all clothing confiscated pt had no valuables. Pt urine sample submitted to lab with pending result, Pt was calm but non verbal at the time of interaction with undersigned, he complied with gowning, and security procedures. Pt is now waiting to be transferred to inpatient psych pending bed availability.

## 2023-03-31 NOTE — BH INPATIENT PSYCHIATRY ASSESSMENT NOTE - CURRENT MEDICATION
MEDICATIONS  (STANDING):    MEDICATIONS  (PRN):  diphenhydrAMINE 50 milliGRAM(s) Oral every 4 hours PRN EPS  diphenhydrAMINE Injectable 50 milliGRAM(s) IntraMuscular once PRN EPS  haloperidol     Tablet 5 milliGRAM(s) Oral every 4 hours PRN agitation from psychosis  haloperidol    Injectable 5 milliGRAM(s) IntraMuscular once PRN combative behavior from psychosis  hydrOXYzine hydrochloride 50 milliGRAM(s) Oral every 6 hours PRN anxiety  LORazepam   Injectable 2 milliGRAM(s) IntraMuscular once PRN combative behavior from psychosis  melatonin. 3 milliGRAM(s) Oral at bedtime PRN Insomnia  nicotine  Polacrilex Gum 2 milliGRAM(s) Oral every 2 hours PRN nicotine   MEDICATIONS  (STANDING):  risperiDONE   Tablet 2 milliGRAM(s) Oral at bedtime    MEDICATIONS  (PRN):  diphenhydrAMINE 50 milliGRAM(s) Oral every 4 hours PRN EPS  diphenhydrAMINE Injectable 50 milliGRAM(s) IntraMuscular once PRN EPS  haloperidol     Tablet 5 milliGRAM(s) Oral every 4 hours PRN agitation from psychosis  haloperidol    Injectable 5 milliGRAM(s) IntraMuscular once PRN combative behavior from psychosis  hydrOXYzine hydrochloride 50 milliGRAM(s) Oral every 6 hours PRN anxiety  LORazepam   Injectable 2 milliGRAM(s) IntraMuscular once PRN combative behavior from psychosis  melatonin. 3 milliGRAM(s) Oral at bedtime PRN Insomnia  nicotine  Polacrilex Gum 2 milliGRAM(s) Oral every 2 hours PRN nicotine

## 2023-03-31 NOTE — BH PATIENT PROFILE - NSPROPTRIGHTBILLOFRIGHTS_GEN_A_NUR
[facility]    Report of Consultation    Geraldo Moreira Patient Status:  [place]    2018 MRN [id number]   [facility] 2NW-A Attending Derrick Lea, [address] [place] [address] Day # 68 PCP No primary care provider on file.      Date of Admis father's brother has child who is trach-dependent and vent-dependent at age 7 due to  tracheal/esophageal issues.  Likely has developmental problems.      Placenta pathology: Acute chorioamnionitis, Acute sub-chorionitis, Areas of perivillous fibrin ectopy was noted during the exam today, 2/6 soft systolic murmur, brisk cap refill, normal pulses X4 throughout. Abd: soft, NT, ND, non-discolored. No HSM. ~ 3 cm abdominal raised hemangioma- appears stable in size.    Neuro: good tone and reflexes.     S ovale with small left to right     shunting is identified. 3. Aorta: Small aorto-pulmonary collateral vessel with a small left to right     shunting. 4. Frequent ectopic beats during the study.             Impression:   RDS (respiratory distress syndrome patient

## 2023-03-31 NOTE — BH INPATIENT PSYCHIATRY ASSESSMENT NOTE - RISK ASSESSMENT
At this time, patient is at imminent risk of harm to self or others and requires inpatient psychiatric admission. Risk factors include hx of inability to engage in safety planning, no current connection to treatment, medication nonadherence, hx of psychosis, hx of mood disorder, hx of psychiatric condition requiring inpatient admission. Protective factors include affective stability and no access to firearms.

## 2023-03-31 NOTE — ED BEHAVIORAL HEALTH ASSESSMENT NOTE - RISK ASSESSMENT
risk factors: male, psych dx, prior admissions, hx of substance use    protective factors: denies SI/HI, unable to assess others

## 2023-03-31 NOTE — BH INPATIENT PSYCHIATRY ASSESSMENT NOTE - NSBHMETABOLIC_PSY_ALL_CORE_FT
BMI: BMI (kg/m2): 32.3 (03-30-23 @ 03:20)  HbA1c:   Glucose:   BP: --  Lipid Panel:  BMI: BMI (kg/m2): 30.4 (03-31-23 @ 18:58)  HbA1c:   Glucose:   BP: --  Lipid Panel:

## 2023-03-31 NOTE — ED BEHAVIORAL HEALTH ASSESSMENT NOTE - OTHER PAST PSYCHIATRIC HISTORY (INCLUDE DETAILS REGARDING ONSET, COURSE OF ILLNESS, INPATIENT/OUTPATIENT TREATMENT)
inpatient admissions at [facility] (2022), [facility] (for 204 days in [id number]), [place] (2021, 2020), [place] (2021, 2020)    hx of residing at [facility]

## 2023-03-31 NOTE — ED ADULT NURSE REASSESSMENT NOTE - NS ED NURSE REASSESS COMMENT FT1
1000 Patient placed on 1:1 constant observation for pt risk for elopement per MD orders. 1:1 in place and directly observed at all times. Pt currently comfortable and safe. Comfort and safety provided.
RN called security to return Pt belongings. Pt belongings returned to him.
Report received from LUCRETIA Wooten Pt AAOx3, NAD, resp nonlabored, skin warm/dry, resting comfortably in bed . Pt denies headache, dizziness, chest pain, palpitations, SOB, abd pain, n/v/d, urinary symptoms, fevers, chills, weakness at this time. Pt awaiting for bed . Safety maintained with call bell within reach.
security wanded pt, pt awake, alert , cooperative

## 2023-03-31 NOTE — DOWNTIME INTERRUPTION NOTE - WHICH MANUAL FORMS INITIATED?
Ralph Irving MD: I was not involved in the care of this patient and am only entering information to back log for downtime. Regarding Emergency Department care during this visit/admission, please see paper chart for isolation and medical management details, as the mandatory fields in the disposition section may not be accurate.

## 2023-03-31 NOTE — BH INPATIENT PSYCHIATRY ASSESSMENT NOTE - HPI (INCLUDE ILLNESS QUALITY, SEVERITY, DURATION, TIMING, CONTEXT, MODIFYING FACTORS, ASSOCIATED SIGNS AND SYMPTOMS)
Patient is a 32-year-old male; unclear current social hx, hx of homelessness; PPHx schizophrenia, per PSYCKES of various dx (mood, psychotic, anxiety, adjustment, substance disorders), prior admissions, denies hx of suicidality or violence; no known PMHx; BIB EMS; psychiatry consulted for evaluation; transferred to [facility] for ongoing psychiatric care.    Per ED  Assessment:  "Pt initially states he came in because he wasn't feeling good and when asked to elaborate he states "I don't know, hearing voices."  He is unable to describe the voices, denies SI/HI.  He states he has a psychiatrist but then is unable to provide a name and denies any prior dx.  He denies being on medications recently.  When asked where he has been staying, he states "I don't know."  Pt reports tobacco use but denies other substance use.  When asked how we can help him, he replies "medications or something.""    On initial assessment in 4 patient states he came to the hospital because he's been feeling confused for a while. Patient is a limited historian, states "I don't know" to multiple questions. Reports feeling depressed, though eating and sleeping well. Feels that his thoughts are unclear and reports some paranoia, denies AHV. Denies any current or history of self harm, passive or active SI, or HI. Patient denies any recent nicotine/tobacco, alcohol, or other substance use. Denies any PMH. Reports taking haldol in the past, feels like it didn't help. Has taken Risperdal with some positive effect, amenable to taking it here.

## 2023-03-31 NOTE — PROGRESS NOTE BEHAVIORAL HEALTH - RISK ASSESSMENT
Risk: Hx of inpatient hospitalizations, medication noncompliant, psychotic d/o, homelessness  protective: Currently denying SI/HI,

## 2023-03-31 NOTE — CHART NOTE - NSCHARTNOTEFT_GEN_A_CORE
Screening Medical Evaluation    Patient Admitted from: [facility] ED     [facility] admitting diagnosis: Schizophrenia      PAST MEDICAL & SURGICAL HISTORY:  No pertinent past medical history      No significant past surgical history            Allergies    No Known Allergies    Intolerances          Social History:       FAMILY HISTORY:        MEDICATIONS  (STANDING):  risperiDONE   Tablet 2 milliGRAM(s) Oral at bedtime    MEDICATIONS  (PRN):  diphenhydrAMINE 50 milliGRAM(s) Oral every 4 hours PRN EPS  diphenhydrAMINE Injectable 50 milliGRAM(s) IntraMuscular once PRN EPS  haloperidol     Tablet 5 milliGRAM(s) Oral every 4 hours PRN agitation from psychosis  haloperidol    Injectable 5 milliGRAM(s) IntraMuscular once PRN combative behavior from psychosis  hydrOXYzine hydrochloride 50 milliGRAM(s) Oral every 6 hours PRN anxiety  LORazepam   Injectable 2 milliGRAM(s) IntraMuscular once PRN combative behavior from psychosis  melatonin. 3 milliGRAM(s) Oral at bedtime PRN Insomnia  nicotine  Polacrilex Gum 2 milliGRAM(s) Oral every 2 hours PRN nicotine        Vital Signs Last 24 Hrs  T(C): --  T(F): -- 98.2*   HR: -- 88b/ min.   BP: -- 132/ 89  BP(mean): --  RR: -- 18b/ min   SpO2: -- 97% ra       CAPILLARY BLOOD GLUCOSE            PHYSICAL EXAM:  GENERAL: NAD, well-developed  HEAD:  Atraumatic, Normocephalic  EYES: EOMI, conjunctiva and sclera clear  NECK: Supple, No JVD  CHEST/LUNG: Clear to auscultation bilaterally; No wheeze  HEART: Regular rate and rhythm; No murmurs, rubs, or gallops  ABDOMEN: Positive BS, Soft, Nontender.   EXTREMITIES:  2+ Peripheral Pulses, No clubbing, cyanosis, or edema  PSYCH: AAOx3  NEUROLOGY: non-focal  SKIN: No rashes or lesions seen on exposed skin.     LABS:                    RADIOLOGY & ADDITIONAL TESTS:      Asses  32M admitted to [facility] for Schizophrenia. No PMHx. Pt seen for medical screening evaluation. Patient has no acute complaints at this time. Patient denies fever, chills, headache, dizziness, lightheadedness, N/V, SOB, cough, congestion, chest pain, abdominal pain, dysuria, hematuria, diarrhea, constipation. Physical exam unremarkable, VSS. Labs pending. EKG _____.     1.) Schizophrenia: Plan per primary team. Screening Medical Evaluation    Patient Admitted from: [facility] ED     [facility] admitting diagnosis: Schizophrenia      PAST MEDICAL & SURGICAL HISTORY:  No pertinent past medical history      No significant past surgical history            Allergies    No Known Allergies    Intolerances          Social History:       FAMILY HISTORY:        MEDICATIONS  (STANDING):  risperiDONE   Tablet 2 milliGRAM(s) Oral at bedtime    MEDICATIONS  (PRN):  diphenhydrAMINE 50 milliGRAM(s) Oral every 4 hours PRN EPS  diphenhydrAMINE Injectable 50 milliGRAM(s) IntraMuscular once PRN EPS  haloperidol     Tablet 5 milliGRAM(s) Oral every 4 hours PRN agitation from psychosis  haloperidol    Injectable 5 milliGRAM(s) IntraMuscular once PRN combative behavior from psychosis  hydrOXYzine hydrochloride 50 milliGRAM(s) Oral every 6 hours PRN anxiety  LORazepam   Injectable 2 milliGRAM(s) IntraMuscular once PRN combative behavior from psychosis  melatonin. 3 milliGRAM(s) Oral at bedtime PRN Insomnia  nicotine  Polacrilex Gum 2 milliGRAM(s) Oral every 2 hours PRN nicotine        Vital Signs Last 24 Hrs  T(C): --  T(F): -- 98.2*   HR: -- 88b/ min.   BP: -- 132/ 89  BP(mean): --  RR: -- 18b/ min   SpO2: -- 97% ra       CAPILLARY BLOOD GLUCOSE            PHYSICAL EXAM:  GENERAL: NAD, well-developed  HEAD:  Atraumatic, Normocephalic  EYES: EOMI, conjunctiva and sclera clear  NECK: Supple, No JVD  CHEST/LUNG: Clear to auscultation bilaterally; No wheeze  HEART: Regular rate and rhythm; No murmurs, rubs, or gallops  ABDOMEN: Positive BS, Soft, Nontender.   PSYCH: seems easily agitated.   NEUROLOGY: non-focal  SKIN: No rashes or lesions seen on exposed skin.     LABS:                    RADIOLOGY & ADDITIONAL TESTS:      Asses  32M admitted to [facility] for Schizophrenia. No PMHx. Pt seen for medical screening evaluation. Patient has no acute complaints at this time. Patient denies fever, chills, headache, dizziness, lightheadedness, N/V, SOB, cough, congestion, chest pain, abdominal pain, dysuria, hematuria, diarrhea, constipation. Physical exam unremarkable, VSS. Labs pending. 3/31/23 EKG NSR 80b/ min, QT/ QTC= 372/ 429    1.) Schizophrenia: Plan per primary team.

## 2023-03-31 NOTE — ED BEHAVIORAL HEALTH NOTE - BEHAVIORAL HEALTH NOTE
===================     PRE-HOSPITAL COURSE     ===================     SOURCE: RN and secondhand ED documentation      DETAILS:  Pt was BIB EMS     ===========     ED COURSE     ============     SOURCE: RN and secondhand ED documentation      ARRIVAL:  BIB EMS     BELONGINGS: No belongings of note. All belongings were provided to hospital security, and patient currently in a gown with a 1:1 staff member.     BEHAVIOR: Per RN, initially on arrival pt reported he is undomiciled, denies SI/HI/AVH, and stated he does not know why police are called on him. RN reports pt has been sleeping the entire time. RN denies visible marks or bruises. RN reports pt is AOx3, good eye contact when engaging, and fair ADL’s .    TREATMENT:  Reports pt was given Tylenol     VISITORS: No one at bedside

## 2023-03-31 NOTE — ED BEHAVIORAL HEALTH ASSESSMENT NOTE - SUMMARY
The patient is a 32-year-old male; unclear current social hx, hx of being undomiciled; PPHx per PSYCKES of various dx (mood, psychotic, anxiety, adjustment, substance disorders), prior admissions, unknown hx of suicidality or violence; unclear PMHx; BIB EMS; psychiatry consulted for evaluation.  Of note, pt with multiple other MRNs in [place].  Telepsych assessment limited as pt is a limited historian and minimally engaged.  Pt with hx of multiple prior inpatient admissions but also hx of malingering for food/shelter.  Will hold pt for reassessment after administration of medication. The patient is a 32-year-old male; unclear current social hx, hx of being undomiciled; PPHx per PSYCKES of various dx (mood, psychotic, anxiety, adjustment, substance disorders), prior admissions, unknown hx of suicidality or violence; unclear PMHx; BIB EMS; psychiatry consulted for evaluation.  Of note, pt with multiple other MRNs in [place] and this is 3rd ED visit in 24 hours.  Telepsych assessment limited as pt is a limited historian and minimally engaged.  Pt with hx of multiple prior inpatient admissions but also hx of malingering for food/shelter.  Will hold pt for reassessment after administration of medication.

## 2023-03-31 NOTE — BH INPATIENT PSYCHIATRY ASSESSMENT NOTE - MSE UNSTRUCTURED FT
JOHAN/BEH: Adult male appears tired; dressed in hospital gown; calm; poor eye contact.   SPEECH: Slow rate, regular tone and volume. One to two word answers.  MOTOR: No PMR/PMA; no other abnormal movements.   MOOD: “Depressed”.   AFFECT: Dysthymic and mood congruent; restricted range; stable.   THOUGHT PROCESS: Linear, vague, and under-inclusive.   THOUGHT CONTENT: Denies passive or active SI or HI; no evidence of delusions, possible paranoia, focused on presenting symptoms.   PERCEPTION: Denies AVH. Does not appear to be responding to internal stimuli.   COGNITION: Grossly intact.   INSIGHT: Fair.   JUDGMENT: Fair.  IMPULSE CONTROL: Limited.

## 2023-03-31 NOTE — BH PATIENT PROFILE - FALL HARM RISK - UNIVERSAL INTERVENTIONS
Bed in lowest position, wheels locked, appropriate side rails in place/Call bell, personal items and telephone in reach/Instruct patient to call for assistance before getting out of bed or chair/Non-slip footwear when patient is out of bed/[place] to call system/Physically safe environment - no spills, clutter or unnecessary equipment/Purposeful Proactive Rounding/Room/bathroom lighting operational, light cord in reach

## 2023-03-31 NOTE — ED BEHAVIORAL HEALTH ASSESSMENT NOTE - DIFFERENTIAL
schizophrenia, schizoaffective disorder, substance-induced mood/psychotic disorder, personality disorder

## 2023-03-31 NOTE — ED BEHAVIORAL HEALTH ASSESSMENT NOTE - HPI (INCLUDE ILLNESS QUALITY, SEVERITY, DURATION, TIMING, CONTEXT, MODIFYING FACTORS, ASSOCIATED SIGNS AND SYMPTOMS)
The patient is a 32-year-old male; unclear current social hx, hx of being undomiciled; PPHx per PSYCKES of various dx (mood, psychotic, anxiety, adjustment, substance disorders), prior admissions, unknown hx of suicidality or violence; unclear PMHx; BIB EMS; psychiatry consulted for evaluation.  Pt initially states he came in because he wasn't feeling good and when asked to elaborate he states "I don't know, hearing voices."  He is unable to describe the voices, denies SI/HI.  He states he has a psychiatrist but then is unable to provide a name and denies any prior dx.  He denies being on medications recently.  When asked where he has been staying, he states "I don't know."  Pt reports tobacco use but denies other substance use.  When asked how we can help him, he replies "medications or something."      Covid Screen - Patient (unclear reliability)  testing? denies positive test in past 3 months  vaccine? denies  exposures? denies

## 2023-03-31 NOTE — PROGRESS NOTE BEHAVIORAL HEALTH - SUMMARY
The patient is a 32-year-old male; unclear current social hx, hx of being undomiciled; PPHx per PSYCKES of various dx (mood, psychotic, anxiety, adjustment, substance disorders), multiple inpatient hosptializations, unknown hx of suicidality or violence; unclear PMHx; BIB EMS for bizarre behavior; psychiatry consulted for evaluation.  Of note, pt with multiple other MRNs in [place] and this is 3rd ED visit in 24 hours.    Presenting with disorganized thoughts/behaviors in context of medication noncompliance, denying any recent substance use (no notable s/s of any alcohol/benzo/opioid withdrawal).   Patient unable to function safely in community. Requires psychiatric inpatient hospitalization for safety, stability and medication optimization.  Last known medication regimen per chart review: Haldol 10 BID, Lithium 600 BID and cogentin 0.5 BID.     Recommendations:  - Keep patient on CO while in the ED   - START haldol 5mg PO QHS and cogentin 0.5  QHS   - Agitation: haldol 5mg PO/IM/IV Q6H PRN;  - No antipsychotics if QTc > 500   - Requires psychiatric inpatient admission. 2PC completed on 3/31/23

## 2023-03-31 NOTE — BH INPATIENT PSYCHIATRY ASSESSMENT NOTE - NSBHCRANIAL_PSY_ALL_CORE
Recognizes 2 fingers or can read (II)/Normal speech (IX, X, XII)/Extraocular Eye Movement Intact  (III, IV, VI)/Shoulder shrug (XI)/Hearing intact (VIII) Recognizes 2 fingers or can read (II)/Smiles, shows teeth, opens mouth, sticks out tongue (V, VII, XI)/Normal speech (IX, X, XII)/Extraocular Eye Movement Intact  (III, IV, VI)/Shoulder shrug (XI)/Hearing intact (VIII)

## 2023-03-31 NOTE — BH INPATIENT PSYCHIATRY ASSESSMENT NOTE - NSBHASSESSSUMMFT_PSY_ALL_CORE
Patient is a 32-year-old male; unclear current social hx, hx of homelessness; PPHx schizophrenia, per PSYCKES of various dx (mood, psychotic, anxiety, adjustment, substance disorders), prior admissions, denies hx of suicidality or violence; no known PMHx; BIB EMS; psychiatry consulted for evaluation; transferred to [place] [id number] for ongoing psychiatric care.     Working diagnosis of schizophrenia spectrum illness per history including schizophrenia, schizoaffective disorder, substance-induced mood/psychotic disorder; R/O personality disorder.    Currently, patient reports some paranoia and thought confusion, denying all other psychotic symptoms. Of note, pt with multiple other MRNs in [place] and 3 ED visits in 24 hours. Assessment limited as pt is a limited historian and minimally engaged.  Pt with hx of multiple prior inpatient admissions but also hx of malingering for food/shelter. Currently denies any thoughts of self harm, passive or active SI, or HI. No known recent substance use, utox negative and blood alcohol negative. Start risperidone, plan to titrate.    Continued inpatient admission required for safety, stabilization, medication optimization, safe discharge planning.      Plan  1. Legal: Admitted on 9.27 status  2. Safety: No reported SI/SIB/HI/VI currently on unit; continue routine observation.   -psychotropic PRN medications for safety  3. Psychiatric:   -start risperidone 2 mg qHS for psychosis; plan to titrate as tolerated; R/B/A and side effects discussed  -PRN melatonin 3 mg qHS for insomnia  4. Therapy: group & milieu therapy as appropriate  5. Medical: No acute medical needs identified  -CBC & CMP WNL, TSH 0.63, utox negative, blood alcohol negative  6. Collateral: Defer to primary team  7. Disposition: When stable

## 2023-03-31 NOTE — CHART NOTE - NSCHARTNOTEFT_GEN_A_CORE
Ultrasound performed during ED downtime. See paper chart
EMERGENCY : SW consulted by medical team as patient requiring inpatient, involuntary psychiatric transfer. LMSW communicated with ED MD who states patient is medically cleared for inpatient psychiatric transfer at this time. LMSW reviewed patient's chart. As per chart review patient is a "32-year-old male; unclear current social hx, hx of being undomiciled; PPHx per PSYCKES of various dx (mood, psychotic, anxiety, adjustment, substance disorders), multiple inpatient hosptializations, unknown hx of suicidality or violence; unclear PMHx; [facility] for bizarre behavior; psychiatry consulted for evaluation." Involuntary legals completed and present in chart.     LMSW contacted Central Intake at [facility] and spoke to [facility] who indicates bed availability for patient. LMSW faxed legals and ekg as requested. Documents and case reviewed and as per Iris, patient accepted to [facility] unit Low [address] with Dr. Patten as the accepting doctor. LMSW made ED MD, ED RN and psychiatry aware. Patient also made aware. LMSW created transfer packet with original legals, ekg, nonemergent, BH assessment, face sheet and transportation forms. Packet placed in chart and RN made aware. Patient's RN to provide handoff to [facility] and arrange ambulance transportation with [facility]. Transfer dispo completed by MD in [place], telepsych email sent. Patient is a self-pay. Social work continues to remain available for any further needs.

## 2023-03-31 NOTE — PROGRESS NOTE BEHAVIORAL HEALTH - NSBHFUPINTERVALHXFT_PSY_A_CORE
Patient seen and evaluated. Similar presentation as ED BH Assessment note, patient vague, does not provide much history. Reports feelings pain but is nonspecific with type of pain, where it is located, when it started, etc. Denies AVH. Does endorse confusion. Unable to state where he was coming from prior to presenting to ED. Unable to explain why he has had three ed visits in last three days. Denies SI/HI.  Denies any substance use.  Per chart review, patient has a history of Schizophrenia. Was previously on Haldol 10 BID, Lithium 600 BID and cogentin 0.5 BID. Had stayed at [facility] (substance facility) in [place] from June 2022 til Jan 2023. [phone]. Spoke w/ Brenda.  Reports that patient was hospitalized psychiatrically multiple times while residing there. Unsure of psych dx or substance use. Unsure of where he went after leaving or if he has family.

## 2023-04-01 LAB
CULTURE RESULTS: SIGNIFICANT CHANGE UP
SPECIMEN SOURCE: SIGNIFICANT CHANGE UP

## 2023-04-01 RX ADMIN — HALOPERIDOL DECANOATE 5 MILLIGRAM(S): 100 INJECTION INTRAMUSCULAR at 08:45

## 2023-04-01 RX ADMIN — Medication 50 MILLIGRAM(S): at 08:45

## 2023-04-01 RX ADMIN — Medication 3 MILLIGRAM(S): at 20:03

## 2023-04-01 RX ADMIN — RISPERIDONE 2 MILLIGRAM(S): 4 TABLET ORAL at 20:02

## 2023-04-01 RX ADMIN — HALOPERIDOL DECANOATE 5 MILLIGRAM(S): 100 INJECTION INTRAMUSCULAR at 20:02

## 2023-04-01 NOTE — BH INPATIENT PSYCHIATRY PROGRESS NOTE - NSBHASSESSSUMMFT_PSY_ALL_CORE
Patient is a 32-year-old male; unclear current social hx, hx of homelessness; PPHx schizophrenia, per PSYCKES of various dx (mood, psychotic, anxiety, adjustment, substance disorders), prior admissions, denies hx of suicidality or violence; no known PMHx; BIB EMS; psychiatry consulted for evaluation; transferred to [place] [address] for ongoing psychiatric care.     Working diagnosis of schizophrenia spectrum illness per history including schizophrenia, schizoaffective disorder, substance-induced mood/psychotic disorder; R/O personality disorder.    Currently, patient reports some paranoia and thought confusion, denying all other psychotic symptoms. Of note, pt with multiple other MRNs in [place] and 3 ED visits in 24 hours. Assessment limited as pt is a limited historian and minimally engaged.  Pt with hx of multiple prior inpatient admissions but also hx of malingering for food/shelter. Currently denies any thoughts of self harm, passive or active SI, or HI. No known recent substance use, utox negative and blood alcohol negative. Start risperidone, plan to titrate.    Continued inpatient admission required for safety, stabilization, medication optimization, safe discharge planning.      Plan  1. Legal: Admitted on 9.27 status  2. Safety: No reported SI/SIB/HI/VI currently on unit; continue routine observation.   -psychotropic PRN medications for safety  3. Psychiatric:   -start risperidone 2 mg qHS for psychosis; plan to titrate as tolerated; R/B/A and side effects discussed  -PRN melatonin 3 mg qHS for insomnia  4. Therapy: group & milieu therapy as appropriate  5. Medical: No acute medical needs identified  -CBC & CMP WNL, TSH 0.63, utox negative, blood alcohol negative  6. Collateral: Defer to primary team  7. Disposition: When stable

## 2023-04-01 NOTE — PSYCHIATRIC REHAB INITIAL EVALUATION - NSBHPRRECOMMEND_PSY_ALL_CORE
Writer attempted to meet with patient, however patient refused to meet; therefore, the following information will be obtained from chart.  Patient was provided with a copy of unit schedule. Patient is not a reliable historian, as chart reports patient responds to most questions with " I don't know". Chart reports patient is currently admitted due to increased disorganization in community. Upon arrival to [facility], patient is minimally visible. Patient and writer established a collaborative psychiatric rehabilitation goal. Writer encouraged patient to attend psychiatric rehabilitation groups and engage in treatment. Psychiatric rehabilitation staff will engage patient daily.

## 2023-04-01 NOTE — BH INPATIENT PSYCHIATRY PROGRESS NOTE - NSBHFUPINTERVALHXFT_PSY_A_CORE
Chart review and Patient was seen and evaluated, chart, medications and labs reviewed. Case discussed with nursing team.  On service for this 32 year old single  AA male.  Patient is undomiciled, hx of homelessness.  Patient has PPH of Schizophrenia Disorder.  Patient has prior multiple inpatient hospitalizations. Patient is now hospitalized with a primary problem psychotic decompensation in context of drug use.  Patient admitted to [facility] on a 9.13 legal status. I have reviewed the initial psychiatric assessment in the electronic medical record, including the history of present illness, past psychiatric history, family/social history (no pertinent changes), and exam, and have confirmed the salient findings dated  3/31/23.    Patient is followed up for psychosis in context of treatment non-adherence and substance abuse.  Chart, medications and labs reviewed, no acute findings.  Patient is discussed with nursing staff. No significant overnight issues.  Per nursing report patient remains compliant with all standing medications, no SE observed or reported.  Per nursing patient remains in fair behavioral control, there has been no aggression, no prns for aggression. Reports eating and sleeping well.  Patient is observed in interview room.  Patient reports low  anxiety and depressive symptoms.  He denies SI/SIB/HI, no intent or plan and engages in safety planning. Patient reports “I need my head fixed”  Patient is not very engaging,  limited historian.  Appeared in distress unable to tolerate interview .  Feels that his thoughts are unclear and reports some paranoia. Patient reports he was abusing Ecstasy on daily basis (amount of pills unknown).   He denies VH.  Endorses constant AH “calling my name” derogatory content. No acute medical concerns.  VSS. Continue to monitor and provide therapeutic support.

## 2023-04-01 NOTE — BH INPATIENT PSYCHIATRY PROGRESS NOTE - NSBHCHARTREVIEWVS_PSY_A_CORE FT
Vital Signs Last 24 Hrs  T(C): 35.8 (03-31-23 @ 18:58), Max: 35.8 (03-31-23 @ 18:58)  T(F): 96.5 (03-31-23 @ 18:58), Max: 96.5 (03-31-23 @ 18:58)  HR: --  BP: --  BP(mean): --  RR: 18 (03-31-23 @ 18:58) (18 - 18)  SpO2: 98% (03-31-23 @ 18:58) (98% - 98%)    Orthostatic VS  04-01-23 @ 08:10  Lying BP: --/-- HR: --  Sitting BP: 97/63 HR: 71  Standing BP: --/-- HR: --  Site: --  Mode: --  Orthostatic VS  03-31-23 @ 18:58  Lying BP: --/-- HR: --  Sitting BP: 123/62 HR: 78  Standing BP: 126/84 HR: 88  Site: --  Mode: --

## 2023-04-01 NOTE — BH INPATIENT PSYCHIATRY PROGRESS NOTE - CURRENT MEDICATION
MEDICATIONS  (STANDING):  risperiDONE   Tablet 2 milliGRAM(s) Oral at bedtime    MEDICATIONS  (PRN):  diphenhydrAMINE 50 milliGRAM(s) Oral every 4 hours PRN EPS  diphenhydrAMINE Injectable 50 milliGRAM(s) IntraMuscular once PRN EPS  haloperidol     Tablet 5 milliGRAM(s) Oral every 4 hours PRN agitation from psychosis  haloperidol    Injectable 5 milliGRAM(s) IntraMuscular once PRN combative behavior from psychosis  hydrOXYzine hydrochloride 50 milliGRAM(s) Oral every 6 hours PRN anxiety  LORazepam   Injectable 2 milliGRAM(s) IntraMuscular once PRN combative behavior from psychosis  melatonin. 3 milliGRAM(s) Oral at bedtime PRN Insomnia  nicotine  Polacrilex Gum 2 milliGRAM(s) Oral every 2 hours PRN nicotine

## 2023-04-01 NOTE — BH INPATIENT PSYCHIATRY PROGRESS NOTE - PRN MEDS
MEDICATIONS  (PRN):  diphenhydrAMINE 50 milliGRAM(s) Oral every 4 hours PRN EPS  diphenhydrAMINE Injectable 50 milliGRAM(s) IntraMuscular once PRN EPS  haloperidol     Tablet 5 milliGRAM(s) Oral every 4 hours PRN agitation from psychosis  haloperidol    Injectable 5 milliGRAM(s) IntraMuscular once PRN combative behavior from psychosis  hydrOXYzine hydrochloride 50 milliGRAM(s) Oral every 6 hours PRN anxiety  LORazepam   Injectable 2 milliGRAM(s) IntraMuscular once PRN combative behavior from psychosis  melatonin. 3 milliGRAM(s) Oral at bedtime PRN Insomnia  nicotine  Polacrilex Gum 2 milliGRAM(s) Oral every 2 hours PRN nicotine

## 2023-04-02 RX ORDER — RISPERIDONE 4 MG/1
3 TABLET ORAL AT BEDTIME
Refills: 0 | Status: DISCONTINUED | OUTPATIENT
Start: 2023-04-02 | End: 2023-04-04

## 2023-04-02 RX ADMIN — RISPERIDONE 3 MILLIGRAM(S): 4 TABLET ORAL at 20:16

## 2023-04-02 RX ADMIN — Medication 50 MILLIGRAM(S): at 21:35

## 2023-04-02 RX ADMIN — HALOPERIDOL DECANOATE 5 MILLIGRAM(S): 100 INJECTION INTRAMUSCULAR at 20:16

## 2023-04-02 RX ADMIN — Medication 3 MILLIGRAM(S): at 20:16

## 2023-04-02 NOTE — BH INPATIENT PSYCHIATRY PROGRESS NOTE - CURRENT MEDICATION
MEDICATIONS  (STANDING):  risperiDONE   Tablet 3 milliGRAM(s) Oral at bedtime    MEDICATIONS  (PRN):  diphenhydrAMINE 50 milliGRAM(s) Oral every 4 hours PRN EPS  diphenhydrAMINE Injectable 50 milliGRAM(s) IntraMuscular once PRN EPS  haloperidol     Tablet 5 milliGRAM(s) Oral every 4 hours PRN agitation from psychosis  haloperidol    Injectable 5 milliGRAM(s) IntraMuscular once PRN combative behavior from psychosis  hydrOXYzine hydrochloride 50 milliGRAM(s) Oral every 6 hours PRN anxiety  LORazepam   Injectable 2 milliGRAM(s) IntraMuscular once PRN combative behavior from psychosis  melatonin. 3 milliGRAM(s) Oral at bedtime PRN Insomnia  nicotine  Polacrilex Gum 2 milliGRAM(s) Oral every 2 hours PRN nicotine

## 2023-04-02 NOTE — BH INPATIENT PSYCHIATRY PROGRESS NOTE - NSBHASSESSSUMMFT_PSY_ALL_CORE
Patient is a 32-year-old male; unclear current social hx, hx of homelessness; PPHx schizophrenia, per PSYCKES of various dx (mood, psychotic, anxiety, adjustment, substance disorders), prior admissions, denies hx of suicidality or violence; no known PMHx; BIB EMS; psychiatry consulted for evaluation; transferred to [place] [address] for ongoing psychiatric care.     Working diagnosis of schizophrenia spectrum illness per history including schizophrenia, schizoaffective disorder, substance-induced mood/psychotic disorder; R/O personality disorder.    Currently, patient reports some paranoia and thought confusion, denying all other psychotic symptoms. Of note, pt with multiple other MRNs in [place] and 3 ED visits in 24 hours. Assessment limited as pt is a limited historian and minimally engaged.  Pt with hx of multiple prior inpatient admissions but also hx of malingering for food/shelter. Currently denies any thoughts of self harm, passive or active SI, or HI. No known recent substance use, utox negative and blood alcohol negative. Start risperidone, plan to titrate.    Continued inpatient admission required for safety, stabilization, medication optimization, safe discharge planning.      Plan  1. Legal: Admitted on 9.27 status  2. Safety: No reported SI/SIB/HI/VI currently on unit; continue routine observation.   -psychotropic PRN medications for safety  3. Psychiatric:   -increase  risperidone 3 mg qHS for psychosis; plan to titrate as tolerated; R/B/A and side effects discussed  -PRN melatonin 3 mg qHS for insomnia  4. Therapy: group & milieu therapy as appropriate  5. Medical: No acute medical needs identified  -CBC & CMP WNL, TSH 0.63, utox negative, blood alcohol negative  6. Collateral: ongoing  7. Disposition: When stable

## 2023-04-02 NOTE — BH INPATIENT PSYCHIATRY PROGRESS NOTE - NSBHFUPINTERVALHXFT_PSY_A_CORE
Chart review and Patient was seen and evaluated, chart, medications and labs reviewed. Case discussed with nursing team.  On service for this 32 year old single  AA male.  Patient is undomiciled, hx of homelessness.  Patient has PPH of Schizophrenia Disorder.  Patient has prior multiple inpatient hospitalizations. Patient is now hospitalized with a primary problem psychotic decompensation in context of drug use.  Chart, medications and labs reviewed, no acute findings.  Patient is discussed with nursing staff. No significant overnight issues.  Per nursing report patient remains compliant with all standing medications, no SE observed or reported.  Titrate Risperdal 3mg qhs.  Per nursing patient remains in fair behavioral control, there has been no aggression, no prns for aggression. Reports eating and sleeping well.  Patient is observed in dayroom.  Patient reports  improved symptoms, reports low  anxiety and decreased depressive symptoms.  He denies SI/SIB/HI, no intent or plan and engages in safety planning. Patient reports “voices are gone”    Patient able to tolerate interview much better today .  Feels less paranoia. Patient reports he was abusing Ecstasy on daily basis (1-2 pills daily for several weeks).   He denies VH.  No acute medical concerns.  VSS. Continue to monitor and provide therapeutic support.

## 2023-04-02 NOTE — BH INPATIENT PSYCHIATRY PROGRESS NOTE - NSBHCHARTREVIEWVS_PSY_A_CORE FT
Vital Signs Last 24 Hrs  T(C): 36.1 (04-02-23 @ 05:51), Max: 36.1 (04-02-23 @ 05:51)  T(F): 97 (04-02-23 @ 05:51), Max: 97 (04-02-23 @ 05:51)  HR: --  BP: --  BP(mean): --  RR: --  SpO2: --    Orthostatic VS  04-02-23 @ 08:12  Lying BP: 111/62 HR: 67  Sitting BP: --/-- HR: --  Standing BP: --/-- HR: --  Site: --  Mode: electronic  Orthostatic VS  04-01-23 @ 19:30  Lying BP: --/-- HR: --  Sitting BP: 111/87 HR: 79  Standing BP: 122/100 HR: 80  Site: --  Mode: --  Orthostatic VS  04-01-23 @ 08:10  Lying BP: --/-- HR: --  Sitting BP: 97/63 HR: 71  Standing BP: --/-- HR: --  Site: --  Mode: --  Orthostatic VS  03-31-23 @ 18:58  Lying BP: --/-- HR: --  Sitting BP: 123/62 HR: 78  Standing BP: 126/84 HR: 88  Site: --  Mode: --

## 2023-04-03 DIAGNOSIS — F19.10 OTHER PSYCHOACTIVE SUBSTANCE ABUSE, UNCOMPLICATED: ICD-10-CM

## 2023-04-03 RX ADMIN — RISPERIDONE 3 MILLIGRAM(S): 4 TABLET ORAL at 20:44

## 2023-04-03 NOTE — BH INPATIENT PSYCHIATRY PROGRESS NOTE - NSBHFUPINTERVALHXFT_PSY_A_CORE
Patient seen and interviewed in room. Patient received Haldol PRN last night for agitation and melatonin for insomnia. Patient discussed in treatment team, discussed treatment goals, clinical progress, and medication changes. Plan to titrate risperidone, now at 3mg qHS.    Patient reports sleeping well and eating this morning. Patient was very disorganized, minimally engaged, and limited historian. When asked about events leading up to admission patient reported feeling very confused and continued repeating "I don't know" to questions. When asked if any family or friends know about his hospitalization or can help provide information he said "I don't know" and appeared upset and frustrated by this. When asked specifically if he has parents or siblings he repeated "I don't know" and then stated "I just need my brain fixed" specifically his memory and the voices that he sometimes hears. He denies current SI/HI and denies AH/VH at this time but it is unclear since his answer changed multiple times regarding currently hearing voices.    When asked about alcohol and substance use patient denied any use except for smoking cigarettes. Pt encouraged to continue taking medication, engaging in groups, and taking care of ADLs to best of ability. VSS.

## 2023-04-03 NOTE — BH INPATIENT PSYCHIATRY PROGRESS NOTE - NSBHCHARTREVIEWVS_PSY_A_CORE FT
Vital Signs Last 24 Hrs  T(C): 36.1 (04-02-23 @ 14:45), Max: 36.1 (04-02-23 @ 14:45)  T(F): 97 (04-02-23 @ 14:45), Max: 97 (04-02-23 @ 14:45)  HR: --  BP: --  BP(mean): --  RR: --  SpO2: --    Orthostatic VS  04-03-23 @ 07:31  Lying BP: --/-- HR: --  Sitting BP: 114/93 HR: 88  Standing BP: 128/-- HR: --  Site: --  Mode: --  Orthostatic VS  04-02-23 @ 08:12  Lying BP: 111/62 HR: 67  Sitting BP: --/-- HR: --  Standing BP: --/-- HR: --  Site: --  Mode: electronic  Orthostatic VS  04-01-23 @ 19:30  Lying BP: --/-- HR: --  Sitting BP: 111/87 HR: 79  Standing BP: 122/100 HR: 80  Site: --  Mode: --  Orthostatic VS  04-01-23 @ 08:10  Lying BP: --/-- HR: --  Sitting BP: 97/63 HR: 71  Standing BP: --/-- HR: --  Site: --  Mode: --   Vital Signs Last 24 Hrs  T(C): 36.1 (04-02-23 @ 14:45), Max: 36.1 (04-02-23 @ 14:45)  T(F): 97 (04-02-23 @ 14:45), Max: 97 (04-02-23 @ 14:45)  HR: --  BP: --  BP(mean): --  RR: --  SpO2: --    Orthostatic VS  04-03-23 @ 07:31  Lying BP: --/-- HR: --  Sitting BP: 114/93 HR: 88  Standing BP: 128/-- HR: --  Site: --  Mode: --  Orthostatic VS  04-02-23 @ 08:12  Lying BP: 111/62 HR: 67  Sitting BP: --/-- HR: --  Standing BP: --/-- HR: --  Site: --  Mode: electronic  Orthostatic VS  04-01-23 @ 19:30  Lying BP: --/-- HR: --  Sitting BP: 111/87 HR: 79  Standing BP: 122/100 HR: 80  Site: --  Mode: --   Vital Signs Last 24 Hrs  T(C): 35.7 (04-03-23 @ 22:38), Max: 36.1 (04-03-23 @ 14:25)  T(F): 96.3 (04-03-23 @ 22:38), Max: 97 (04-03-23 @ 14:25)  HR: --  BP: --  BP(mean): --  RR: 17 (04-03-23 @ 20:56) (17 - 17)  SpO2: --    Orthostatic VS  04-03-23 @ 19:00  Lying BP: --/-- HR: --  Sitting BP: 104/64 HR: 77  Standing BP: 112/72 HR: 85  Site: --  Mode: --  Orthostatic VS  04-03-23 @ 07:31  Lying BP: --/-- HR: --  Sitting BP: 114/93 HR: 88  Standing BP: 128/-- HR: --  Site: --  Mode: --  Orthostatic VS  04-02-23 @ 08:12  Lying BP: 111/62 HR: 67  Sitting BP: --/-- HR: --  Standing BP: --/-- HR: --  Site: --  Mode: electronic   Vital Signs Last 24 Hrs  T(C): 36.5 (04-04-23 @ 19:20), Max: 36.8 (04-04-23 @ 06:12)  T(F): 97.7 (04-04-23 @ 19:20), Max: 98.3 (04-04-23 @ 06:12)  HR: --  BP: --  BP(mean): --  RR: 17 (04-03-23 @ 20:56) (17 - 17)  SpO2: --    Orthostatic VS  04-04-23 @ 19:20  Lying BP: --/-- HR: --  Sitting BP: 101/76 HR: 78  Standing BP: 100/68 HR: 74  Site: --  Mode: --  Orthostatic VS  04-04-23 @ 08:05  Lying BP: --/-- HR: --  Sitting BP: 108/69 HR: 100  Standing BP: 100/65 HR: 104  Site: --  Mode: --  Orthostatic VS  04-03-23 @ 19:00  Lying BP: --/-- HR: --  Sitting BP: 104/64 HR: 77  Standing BP: 112/72 HR: 85  Site: --  Mode: --  Orthostatic VS  04-03-23 @ 07:31  Lying BP: --/-- HR: --  Sitting BP: 114/93 HR: 88  Standing BP: 128/-- HR: --  Site: --  Mode: --

## 2023-04-03 NOTE — BH INPATIENT PSYCHIATRY PROGRESS NOTE - NSBHASSESSSUMMFT_PSY_ALL_CORE
Patient is a 32-year-old male; unclear current social hx, hx of homelessness; PPHx schizophrenia, per PSYCKES of various dx (mood, psychotic, anxiety, adjustment, substance disorders), prior admissions, denies hx of suicidality or violence; no known PMHx; BIB EMS; psychiatry consulted for evaluation; transferred to [place] [id number] for ongoing psychiatric care.     Working diagnosis of schizophrenia spectrum illness per history including schizophrenia, schizoaffective disorder, substance-induced mood/psychotic disorder; R/O personality disorder.    Currently, patient reports some paranoia and thought confusion, denying all other psychotic symptoms. Of note, pt with multiple other MRNs in [place] and 3 ED visits in 24 hours. Assessment limited as pt is a limited historian and minimally engaged.  Pt with hx of multiple prior inpatient admissions but also hx of malingering for food/shelter. Currently denies any thoughts of self harm, passive or active SI, or HI. No known recent substance use, utox negative and blood alcohol negative. Start risperidone, plan to titrate.    Continued inpatient admission required for safety, stabilization, medication optimization, safe discharge planning.      Plan  1. Legal: Admitted on 9.27 status  2. Safety: No reported SI/SIB/HI/VI currently on unit; continue routine observation.   -psychotropic PRN medications for safety  3. Psychiatric:   -increase  risperidone 4 mg qHS for psychosis; plan to titrate as tolerated; R/B/A and side effects discussed  -PRN melatonin 3 mg qHS for insomnia  4. Therapy: group & milieu therapy as appropriate  5. Medical: No acute medical needs identified  -CBC & CMP WNL, TSH 0.63, utox negative, blood alcohol negative  6. Collateral: ongoing  7. Disposition: When stable    Patient is a 32-year-old male; unclear current social hx, hx of homelessness; PPHx schizophrenia, per PSYCKES of various dx (mood, psychotic, anxiety, adjustment, substance disorders), prior admissions, denies hx of suicidality or violence; no known PMHx; BIB EMS; psychiatry consulted for evaluation; transferred to [place] [address] for ongoing psychiatric care.     Working diagnosis of schizophrenia spectrum illness per history including schizophrenia, schizoaffective disorder, substance-induced mood/psychotic disorder; R/O personality disorder.    Currently, patient reports some paranoia and thought confusion, denying all other psychotic symptoms. Of note, pt with multiple other MRNs in [place] and 3 ED visits in 24 hours. Assessment limited as pt is a limited historian and minimally engaged.  Pt with hx of multiple prior inpatient admissions but also hx of malingering for food/shelter. Currently denies any thoughts of self harm, passive or active SI, or HI. No known recent substance use, utox negative and blood alcohol negative. Start risperidone, plan to titrate.    Continued inpatient admission required for safety, stabilization, medication optimization, safe discharge planning.      Plan  1. Legal: Admitted on 9.27 status  2. Safety: No reported SI/SIB/HI/VI currently on unit; continue routine observation.   -psychotropic PRN medications for safety  3. Psychiatric:   -increase  risperidone 3 mg qHS for psychosis; plan to titrate as tolerated; R/B/A and side effects discussed  -PRN melatonin 3 mg qHS for insomnia  4. Therapy: group & milieu therapy as appropriate  5. Medical: No acute medical needs identified  -CBC & CMP WNL, TSH 0.63, utox negative, blood alcohol negative  6. Collateral: ongoing  7. Disposition: When stable

## 2023-04-03 NOTE — BH INPATIENT PSYCHIATRY PROGRESS NOTE - PRN MEDS
MEDICATIONS  (PRN):  diphenhydrAMINE 50 milliGRAM(s) Oral every 4 hours PRN EPS  diphenhydrAMINE Injectable 50 milliGRAM(s) IntraMuscular once PRN EPS  haloperidol     Tablet 5 milliGRAM(s) Oral every 4 hours PRN agitation from psychosis  haloperidol    Injectable 5 milliGRAM(s) IntraMuscular once PRN combative behavior from psychosis  hydrOXYzine hydrochloride 50 milliGRAM(s) Oral every 6 hours PRN anxiety  LORazepam   Injectable 2 milliGRAM(s) IntraMuscular once PRN combative behavior from psychosis  melatonin. 3 milliGRAM(s) Oral at bedtime PRN Insomnia  nicotine  Polacrilex Gum 2 milliGRAM(s) Oral every 2 hours PRN nicotine   MEDICATIONS  (PRN):  diphenhydrAMINE 50 milliGRAM(s) Oral every 4 hours PRN EPS  diphenhydrAMINE Injectable 50 milliGRAM(s) IntraMuscular once PRN EPS  haloperidol     Tablet 5 milliGRAM(s) Oral every 4 hours PRN agitation from psychosis  haloperidol    Injectable 5 milliGRAM(s) IntraMuscular once PRN combative behavior from psychosis  hydrOXYzine hydrochloride 50 milliGRAM(s) Oral every 6 hours PRN anxiety  LORazepam   Injectable 2 milliGRAM(s) IntraMuscular once PRN combative behavior 2/2 psychosis  melatonin. 3 milliGRAM(s) Oral at bedtime PRN Insomnia  nicotine  Polacrilex Gum 2 milliGRAM(s) Oral every 2 hours PRN nicotine

## 2023-04-03 NOTE — BH INPATIENT PSYCHIATRY PROGRESS NOTE - CURRENT MEDICATION
MEDICATIONS  (STANDING):  risperiDONE   Tablet 3 milliGRAM(s) Oral at bedtime    MEDICATIONS  (PRN):  diphenhydrAMINE 50 milliGRAM(s) Oral every 4 hours PRN EPS  diphenhydrAMINE Injectable 50 milliGRAM(s) IntraMuscular once PRN EPS  haloperidol     Tablet 5 milliGRAM(s) Oral every 4 hours PRN agitation from psychosis  haloperidol    Injectable 5 milliGRAM(s) IntraMuscular once PRN combative behavior from psychosis  hydrOXYzine hydrochloride 50 milliGRAM(s) Oral every 6 hours PRN anxiety  LORazepam   Injectable 2 milliGRAM(s) IntraMuscular once PRN combative behavior from psychosis  melatonin. 3 milliGRAM(s) Oral at bedtime PRN Insomnia  nicotine  Polacrilex Gum 2 milliGRAM(s) Oral every 2 hours PRN nicotine   MEDICATIONS  (STANDING):  risperiDONE   Tablet 4 milliGRAM(s) Oral at bedtime    MEDICATIONS  (PRN):  diphenhydrAMINE 50 milliGRAM(s) Oral every 4 hours PRN EPS  diphenhydrAMINE Injectable 50 milliGRAM(s) IntraMuscular once PRN EPS  haloperidol     Tablet 5 milliGRAM(s) Oral every 4 hours PRN agitation from psychosis  haloperidol    Injectable 5 milliGRAM(s) IntraMuscular once PRN combative behavior from psychosis  hydrOXYzine hydrochloride 50 milliGRAM(s) Oral every 6 hours PRN anxiety  LORazepam   Injectable 2 milliGRAM(s) IntraMuscular once PRN combative behavior 2/2 psychosis  melatonin. 3 milliGRAM(s) Oral at bedtime PRN Insomnia  nicotine  Polacrilex Gum 2 milliGRAM(s) Oral every 2 hours PRN nicotine

## 2023-04-03 NOTE — BH SOCIAL WORK INITIAL PSYCHOSOCIAL EVALUATION - OTHER PAST PSYCHIATRIC HISTORY (INCLUDE DETAILS REGARDING ONSET, COURSE OF ILLNESS, INPATIENT/OUTPATIENT TREATMENT)
Patient is a 32-year-old  male; undomiciled; hx of homelessness; PPHx schizophrenia, per PSYCKES of various dx (mood, psychotic, anxiety, adjustment, substance disorders), prior admissions, denies hx of suicidality or violence; no known PMHx; BIB EMS; psychiatry consulted for evaluation; transferred to [place] [address] for ongoing psychiatric care.  Per ED report, working diagnosis of schizophrenia spectrum illness per history including schizophrenia, schizoaffective disorder, substance-induced mood/psychotic disorder; R/O personality disorder.  Currently, patient reported some paranoia and thought confusion, denying all other psychotic symptoms. Of note, pt with multiple other MRNs in [place] and 3 ED visits in 24 hours. Assessment limited as pt is a limited historian and minimally engaged.  Pt with hx of multiple prior inpatient admissions but also hx of malingering for food/shelter. Currently denies any thoughts of self harm, passive or active SI, or HI. Pt. reported daily use of ecstasy, 1-2 pills for several weeks.  Currently her reported AH calling his name and making derogatory comments.     Inpatient admissions at [facility] (2022), [facility] (for 204 days in [id number]), [facility] (2021, 2020), [facility] (2021, 2020)  hx of residing at [facility]

## 2023-04-04 RX ORDER — RISPERIDONE 4 MG/1
4 TABLET ORAL AT BEDTIME
Refills: 0 | Status: DISCONTINUED | OUTPATIENT
Start: 2023-04-04 | End: 2023-04-21

## 2023-04-04 RX ADMIN — RISPERIDONE 4 MILLIGRAM(S): 4 TABLET ORAL at 20:24

## 2023-04-04 NOTE — BH INPATIENT PSYCHIATRY PROGRESS NOTE - PRN MEDS
MEDICATIONS  (PRN):  diphenhydrAMINE 50 milliGRAM(s) Oral every 4 hours PRN EPS  diphenhydrAMINE Injectable 50 milliGRAM(s) IntraMuscular once PRN EPS  haloperidol     Tablet 5 milliGRAM(s) Oral every 4 hours PRN agitation from psychosis  haloperidol    Injectable 5 milliGRAM(s) IntraMuscular once PRN combative behavior from psychosis  hydrOXYzine hydrochloride 50 milliGRAM(s) Oral every 6 hours PRN anxiety  LORazepam   Injectable 2 milliGRAM(s) IntraMuscular once PRN combative behavior 2/2 psychosis  melatonin. 3 milliGRAM(s) Oral at bedtime PRN Insomnia  nicotine  Polacrilex Gum 2 milliGRAM(s) Oral every 2 hours PRN nicotine

## 2023-04-04 NOTE — BH INPATIENT PSYCHIATRY PROGRESS NOTE - NSBHASSESSSUMMFT_PSY_ALL_CORE
Patient is a 32-year-old male; unclear current social hx, hx of homelessness; PPHx schizophrenia, per PSYCKES of various dx (mood, psychotic, anxiety, adjustment, substance disorders), prior admissions, denies hx of suicidality or violence; no known PMHx; BIB EMS; psychiatry consulted for evaluation; transferred to [place] [address] for ongoing psychiatric care.     Working diagnosis of schizophrenia spectrum illness per history including schizophrenia, schizoaffective disorder, substance-induced mood/psychotic disorder; R/O personality disorder.    Currently, patient reports some paranoia and thought confusion, denying all other psychotic symptoms. Of note, pt with multiple other MRNs in [place] and 3 ED visits in 24 hours. Assessment limited as pt is a limited historian and minimally engaged.  Pt with hx of multiple prior inpatient admissions but also hx of malingering for food/shelter. Currently denies any thoughts of self harm, passive or active SI, or HI. No known recent substance use, utox negative and blood alcohol negative. Start risperidone, plan to titrate.    Continued inpatient admission required for safety, stabilization, medication optimization, safe discharge planning.      Plan  1. Legal: Admitted on 9.27 status  2. Safety: No reported SI/SIB/HI/VI currently on unit; continue routine observation.   -psychotropic PRN medications for safety  3. Psychiatric:   -increase  risperidone 4 mg qHS for psychosis; plan to titrate as tolerated; R/B/A and side effects discussed  -PRN melatonin 3 mg qHS for insomnia  4. Therapy: group & milieu therapy as appropriate  5. Medical: No acute medical needs identified  -CBC & CMP WNL, TSH 0.63, utox negative, blood alcohol negative  6. Collateral: ongoing  7. Disposition: When stable

## 2023-04-04 NOTE — BH INPATIENT PSYCHIATRY PROGRESS NOTE - NSBHFUPINTERVALHXFT_PSY_A_CORE
Patient seen and interviewed in room. No overnight concerns. Patient discussed in treatment team, discussed treatment goals, clinical progress, and medication changes. Patient tolerating increase to risperidone 3mg qHS with no SE. Plan to titrate to 4mg qHS today (4/4). Patient reports sleeping "okay" and eating this morning. He continues to be very disorganized, minimally engaged and limited historian. Very thought blocked and patient appears frustrated by difficulty remembering information and communicating. He repeatedly states "I don't know" and "I just want to go home" when asked any questions. He did explain that he lived in a "house with other people" prior to his admission and that the people may have been his family members though he "doesn't know". When asked about his family members he recalled that his mother's name is Raegan but was unable to provide any additional information.    He stated that his "head is just very messed up" but was unable to elaborate about what he is feeling or the contents of his thoughts. Patient has been adherent to medication and is encouraged to continue taking medication, engaging in groups, and taking care of ADLs to best of ability. Denies AH/VH. VSS.

## 2023-04-04 NOTE — BH INPATIENT PSYCHIATRY PROGRESS NOTE - CURRENT MEDICATION
MEDICATIONS  (STANDING):  risperiDONE   Tablet 4 milliGRAM(s) Oral at bedtime    MEDICATIONS  (PRN):  diphenhydrAMINE 50 milliGRAM(s) Oral every 4 hours PRN EPS  diphenhydrAMINE Injectable 50 milliGRAM(s) IntraMuscular once PRN EPS  haloperidol     Tablet 5 milliGRAM(s) Oral every 4 hours PRN agitation from psychosis  haloperidol    Injectable 5 milliGRAM(s) IntraMuscular once PRN combative behavior from psychosis  hydrOXYzine hydrochloride 50 milliGRAM(s) Oral every 6 hours PRN anxiety  LORazepam   Injectable 2 milliGRAM(s) IntraMuscular once PRN combative behavior 2/2 psychosis  melatonin. 3 milliGRAM(s) Oral at bedtime PRN Insomnia  nicotine  Polacrilex Gum 2 milliGRAM(s) Oral every 2 hours PRN nicotine

## 2023-04-04 NOTE — BH INPATIENT PSYCHIATRY PROGRESS NOTE - NSBHCHARTREVIEWVS_PSY_A_CORE FT
Vital Signs Last 24 Hrs  T(C): 36.8 (04-04-23 @ 06:12), Max: 36.8 (04-04-23 @ 06:12)  T(F): 98.3 (04-04-23 @ 06:12), Max: 98.3 (04-04-23 @ 06:12)  HR: --  BP: --  BP(mean): --  RR: 17 (04-03-23 @ 20:56) (17 - 17)  SpO2: --    Orthostatic VS  04-04-23 @ 08:05  Lying BP: --/-- HR: --  Sitting BP: 108/69 HR: 100  Standing BP: 100/65 HR: 104  Site: --  Mode: --  Orthostatic VS  04-03-23 @ 19:00  Lying BP: --/-- HR: --  Sitting BP: 104/64 HR: 77  Standing BP: 112/72 HR: 85  Site: --  Mode: --  Orthostatic VS  04-03-23 @ 07:31  Lying BP: --/-- HR: --  Sitting BP: 114/93 HR: 88  Standing BP: 128/-- HR: --  Site: --  Mode: --  Orthostatic VS  04-02-23 @ 08:12  Lying BP: 111/62 HR: 67  Sitting BP: --/-- HR: --  Standing BP: --/-- HR: --  Site: --  Mode: electronic   Vital Signs Last 24 Hrs  T(C): 36.8 (04-04-23 @ 06:12), Max: 36.8 (04-04-23 @ 06:12)  T(F): 98.3 (04-04-23 @ 06:12), Max: 98.3 (04-04-23 @ 06:12)  HR: --  BP: --  BP(mean): --  RR: 17 (04-03-23 @ 20:56) (17 - 17)  SpO2: --    Orthostatic VS  04-04-23 @ 08:05  Lying BP: --/-- HR: --  Sitting BP: 108/69 HR: 100  Standing BP: 100/65 HR: 104  Site: --  Mode: --  Orthostatic VS  04-03-23 @ 19:00  Lying BP: --/-- HR: --  Sitting BP: 104/64 HR: 77  Standing BP: 112/72 HR: 85  Site: --  Mode: --  Orthostatic VS  04-03-23 @ 07:31  Lying BP: --/-- HR: --  Sitting BP: 114/93 HR: 88  Standing BP: 128/-- HR: --  Site: --  Mode: --   Vital Signs Last 24 Hrs  T(C): 36.5 (04-04-23 @ 19:20), Max: 36.8 (04-04-23 @ 06:12)  T(F): 97.7 (04-04-23 @ 19:20), Max: 98.3 (04-04-23 @ 06:12)  HR: --  BP: --  BP(mean): --  RR: 17 (04-03-23 @ 20:56) (17 - 17)  SpO2: --    Orthostatic VS  04-04-23 @ 19:20  Lying BP: --/-- HR: --  Sitting BP: 101/76 HR: 78  Standing BP: 100/68 HR: 74  Site: --  Mode: --  Orthostatic VS  04-04-23 @ 08:05  Lying BP: --/-- HR: --  Sitting BP: 108/69 HR: 100  Standing BP: 100/65 HR: 104  Site: --  Mode: --  Orthostatic VS  04-03-23 @ 19:00  Lying BP: --/-- HR: --  Sitting BP: 104/64 HR: 77  Standing BP: 112/72 HR: 85  Site: --  Mode: --  Orthostatic VS  04-03-23 @ 07:31  Lying BP: --/-- HR: --  Sitting BP: 114/93 HR: 88  Standing BP: 128/-- HR: --  Site: --  Mode: --

## 2023-04-05 RX ORDER — DIVALPROEX SODIUM 500 MG/1
500 TABLET, DELAYED RELEASE ORAL AT BEDTIME
Refills: 0 | Status: DISCONTINUED | OUTPATIENT
Start: 2023-04-05 | End: 2023-04-06

## 2023-04-05 RX ADMIN — Medication 50 MILLIGRAM(S): at 20:48

## 2023-04-05 RX ADMIN — Medication 50 MILLIGRAM(S): at 11:03

## 2023-04-05 RX ADMIN — Medication 3 MILLIGRAM(S): at 20:49

## 2023-04-05 RX ADMIN — RISPERIDONE 4 MILLIGRAM(S): 4 TABLET ORAL at 20:40

## 2023-04-05 RX ADMIN — HALOPERIDOL DECANOATE 5 MILLIGRAM(S): 100 INJECTION INTRAMUSCULAR at 11:03

## 2023-04-05 RX ADMIN — DIVALPROEX SODIUM 500 MILLIGRAM(S): 500 TABLET, DELAYED RELEASE ORAL at 20:40

## 2023-04-05 RX ADMIN — Medication 50 MILLIGRAM(S): at 20:49

## 2023-04-05 RX ADMIN — HALOPERIDOL DECANOATE 5 MILLIGRAM(S): 100 INJECTION INTRAMUSCULAR at 20:49

## 2023-04-05 NOTE — BH INPATIENT PSYCHIATRY PROGRESS NOTE - CURRENT MEDICATION
MEDICATIONS  (STANDING):  risperiDONE   Tablet 4 milliGRAM(s) Oral at bedtime    MEDICATIONS  (PRN):  diphenhydrAMINE 50 milliGRAM(s) Oral every 4 hours PRN EPS  diphenhydrAMINE Injectable 50 milliGRAM(s) IntraMuscular once PRN EPS  haloperidol     Tablet 5 milliGRAM(s) Oral every 4 hours PRN agitation from psychosis  haloperidol    Injectable 5 milliGRAM(s) IntraMuscular once PRN combative behavior from psychosis  hydrOXYzine hydrochloride 50 milliGRAM(s) Oral every 6 hours PRN anxiety  LORazepam   Injectable 2 milliGRAM(s) IntraMuscular once PRN combative behavior 2/2 psychosis  melatonin. 3 milliGRAM(s) Oral at bedtime PRN Insomnia  nicotine  Polacrilex Gum 2 milliGRAM(s) Oral every 2 hours PRN nicotine   MEDICATIONS  (STANDING):  divalproex  milliGRAM(s) Oral at bedtime  risperiDONE   Tablet 4 milliGRAM(s) Oral at bedtime    MEDICATIONS  (PRN):  diphenhydrAMINE 50 milliGRAM(s) Oral every 4 hours PRN EPS  diphenhydrAMINE Injectable 50 milliGRAM(s) IntraMuscular once PRN EPS  haloperidol     Tablet 5 milliGRAM(s) Oral every 4 hours PRN agitation from psychosis  haloperidol    Injectable 5 milliGRAM(s) IntraMuscular once PRN combative behavior from psychosis  hydrOXYzine hydrochloride 50 milliGRAM(s) Oral every 6 hours PRN anxiety  LORazepam   Injectable 2 milliGRAM(s) IntraMuscular once PRN combative behavior 2/2 psychosis  melatonin. 3 milliGRAM(s) Oral at bedtime PRN Insomnia  nicotine  Polacrilex Gum 2 milliGRAM(s) Oral every 2 hours PRN nicotine

## 2023-04-05 NOTE — BH INPATIENT PSYCHIATRY PROGRESS NOTE - NSBHCHARTREVIEWVS_PSY_A_CORE FT
Vital Signs Last 24 Hrs  T(C): 35.6 (04-05-23 @ 06:29), Max: 36.5 (04-04-23 @ 19:20)  T(F): 96 (04-05-23 @ 06:29), Max: 97.7 (04-04-23 @ 19:20)  HR: --  BP: --  BP(mean): --  RR: --  SpO2: --    Orthostatic VS  04-04-23 @ 19:20  Lying BP: --/-- HR: --  Sitting BP: 101/76 HR: 78  Standing BP: 100/68 HR: 74  Site: --  Mode: --  Orthostatic VS  04-04-23 @ 08:05  Lying BP: --/-- HR: --  Sitting BP: 108/69 HR: 100  Standing BP: 100/65 HR: 104  Site: --  Mode: --  Orthostatic VS  04-03-23 @ 19:00  Lying BP: --/-- HR: --  Sitting BP: 104/64 HR: 77  Standing BP: 112/72 HR: 85  Site: --  Mode: --  Orthostatic VS  04-03-23 @ 07:31  Lying BP: --/-- HR: --  Sitting BP: 114/93 HR: 88  Standing BP: 128/-- HR: --  Site: --  Mode: --   Vital Signs Last 24 Hrs  T(C): 35.6 (04-05-23 @ 06:29), Max: 36.5 (04-04-23 @ 19:20)  T(F): 96 (04-05-23 @ 06:29), Max: 97.7 (04-04-23 @ 19:20)  HR: 73 (04-05-23 @ 08:08) (73 - 73)  BP: 108/67 (04-05-23 @ 08:08) (108/67 - 108/67)  BP(mean): --  RR: --  SpO2: --    Orthostatic VS  04-04-23 @ 19:20  Lying BP: --/-- HR: --  Sitting BP: 101/76 HR: 78  Standing BP: 100/68 HR: 74  Site: --  Mode: --  Orthostatic VS  04-04-23 @ 08:05  Lying BP: --/-- HR: --  Sitting BP: 108/69 HR: 100  Standing BP: 100/65 HR: 104  Site: --  Mode: --  Orthostatic VS  04-03-23 @ 19:00  Lying BP: --/-- HR: --  Sitting BP: 104/64 HR: 77  Standing BP: 112/72 HR: 85  Site: --  Mode: --   Vital Signs Last 24 Hrs  T(C): 36.1 (04-06-23 @ 14:49), Max: 37.2 (04-05-23 @ 19:00)  T(F): 97 (04-06-23 @ 14:49), Max: 98.9 (04-05-23 @ 19:00)  HR: --  BP: --  BP(mean): --  RR: --  SpO2: --    Orthostatic VS  04-06-23 @ 08:13  Lying BP: --/-- HR: --  Sitting BP: 120/77 HR: 75  Standing BP: 130/88 HR: 86  Site: --  Mode: --  Orthostatic VS  04-05-23 @ 19:00  Lying BP: --/-- HR: --  Sitting BP: 103/76 HR: 70  Standing BP: 97/68 HR: 70  Site: --  Mode: --  Orthostatic VS  04-04-23 @ 19:20  Lying BP: --/-- HR: --  Sitting BP: 101/76 HR: 78  Standing BP: 100/68 HR: 74  Site: --  Mode: --

## 2023-04-05 NOTE — BH INPATIENT PSYCHIATRY PROGRESS NOTE - NSBHASSESSSUMMFT_PSY_ALL_CORE
Patient is a 32-year-old male; unclear current social hx, hx of homelessness; PPHx schizophrenia, per PSYCKES of various dx (mood, psychotic, anxiety, adjustment, substance disorders), prior admissions, denies hx of suicidality or violence; no known PMHx; BIB EMS; psychiatry consulted for evaluation; transferred to [place] [address] for ongoing psychiatric care.     Working diagnosis of schizophrenia spectrum illness per history including schizophrenia, schizoaffective disorder, substance-induced mood/psychotic disorder; R/O personality disorder.    Currently, patient reports some paranoia and thought confusion, denying all other psychotic symptoms. Of note, pt with multiple other MRNs in [place] and 3 ED visits in 24 hours. Assessment limited as pt is a limited historian and minimally engaged.  Pt with hx of multiple prior inpatient admissions but also hx of malingering for food/shelter. Currently denies any thoughts of self harm, passive or active SI, or HI. No known recent substance use, utox negative and blood alcohol negative. Start risperidone, plan to titrate.    Continued inpatient admission required for safety, stabilization, medication optimization, safe discharge planning.      Plan  1. Legal: Admitted on 9.27 status  2. Safety: No reported SI/SIB/HI/VI currently on unit; continue routine observation.   -psychotropic PRN medications for safety  3. Psychiatric:   -increase  risperidone 4 mg qHS for psychosis; plan to titrate as tolerated; R/B/A and side effects discussed  -PRN melatonin 3 mg qHS for insomnia  4. Therapy: group & milieu therapy as appropriate  5. Medical: No acute medical needs identified  -CBC & CMP WNL, TSH 0.63, utox negative, blood alcohol negative  6. Collateral: ongoing  7. Disposition: When stable

## 2023-04-05 NOTE — BH INPATIENT PSYCHIATRY PROGRESS NOTE - NSBHMETABOLIC_PSY_ALL_CORE_FT
BMI: BMI (kg/m2): 30.4 (03-31-23 @ 18:58)  HbA1c:   Glucose:   BP: --  Lipid Panel:  BMI: BMI (kg/m2): 30.4 (03-31-23 @ 18:58)  HbA1c:   Glucose:   BP: 108/67 (04-05-23 @ 08:08) (108/67 - 108/67)  Lipid Panel:

## 2023-04-05 NOTE — BH INPATIENT PSYCHIATRY PROGRESS NOTE - NSBHFUPINTERVALHXFT_PSY_A_CORE
Patient seen and interviewed in room. No overnight concerns. Patient discussed in treatment team, discussed treatment goals, clinical progress, and medication changes. Patient tolerating increase to risperidone 4mg qHS with no SE. He reports that he has been sleeping "fine" and was able to eat this morning. He continues to be very internally preoccupied and minimally engaged. Very limited historian, and thought blocked. When asked about events leading to hospitalization and if he has family/friends who can help provide information he repeatedly states "I don't know" and "I just want to go home" and expressed frustration with not being able to remember this information.    Patient encouraged to continue taking medication, engaging in groups, and taking care of ADLs to best of ability.   Pt remains compliant with medications and denies SE. Denies AH though he does appear to speak to himself occasionally throughout interview. Denies VH. VSS. Patient seen and interviewed in room. No overnight concerns. Patient discussed in treatment team, discussed treatment goals, clinical progress, and medication changes. Patient tolerating increase to risperidone 4mg qHS with no SE. He reports that he has been sleeping "fine" and was able to eat this morning. He continues to be very internally preoccupied and minimally engaged. Very limited historian, and  extremely thought blocked. When asked about events leading to hospitalization and if he has family/friends who can help provide information he repeatedly states "I don't know" and "I just want to go home" and expressed frustration with not being able to remember this information.  Patient reports still hearing voices, laughs inappropriately, appears internally disturbed.  Patient rubbing his head, states "my head is not right" states he feels confused.  Unable to tolerate long interview, given Haldol 5mg prn during interview.  Later in the morning pt is observed shadow boxing in hallway, he is responding to internal stimuli, laughs to self.    Patient encouraged to continue taking medication, engaging in groups, and taking care of ADLs to best of ability.   Pt remains compliant with medications and denies SE. Denies AH though he does appear to speak to himself occasionally throughout interview. Denies VH. VSS.

## 2023-04-06 RX ORDER — DIVALPROEX SODIUM 500 MG/1
500 TABLET, DELAYED RELEASE ORAL
Refills: 0 | Status: DISCONTINUED | OUTPATIENT
Start: 2023-04-06 | End: 2023-04-06

## 2023-04-06 RX ORDER — DIVALPROEX SODIUM 500 MG/1
750 TABLET, DELAYED RELEASE ORAL AT BEDTIME
Refills: 0 | Status: DISCONTINUED | OUTPATIENT
Start: 2023-04-06 | End: 2023-04-06

## 2023-04-06 RX ORDER — DIVALPROEX SODIUM 500 MG/1
750 TABLET, DELAYED RELEASE ORAL
Refills: 0 | Status: DISCONTINUED | OUTPATIENT
Start: 2023-04-07 | End: 2023-04-10

## 2023-04-06 RX ORDER — DIVALPROEX SODIUM 500 MG/1
750 TABLET, DELAYED RELEASE ORAL
Refills: 0 | Status: DISCONTINUED | OUTPATIENT
Start: 2023-04-06 | End: 2023-04-06

## 2023-04-06 RX ADMIN — HALOPERIDOL DECANOATE 5 MILLIGRAM(S): 100 INJECTION INTRAMUSCULAR at 20:30

## 2023-04-06 RX ADMIN — Medication 3 MILLIGRAM(S): at 20:29

## 2023-04-06 RX ADMIN — RISPERIDONE 4 MILLIGRAM(S): 4 TABLET ORAL at 20:32

## 2023-04-06 RX ADMIN — HALOPERIDOL DECANOATE 5 MILLIGRAM(S): 100 INJECTION INTRAMUSCULAR at 10:29

## 2023-04-06 RX ADMIN — DIVALPROEX SODIUM 500 MILLIGRAM(S): 500 TABLET, DELAYED RELEASE ORAL at 10:11

## 2023-04-06 RX ADMIN — Medication 50 MILLIGRAM(S): at 10:29

## 2023-04-06 RX ADMIN — Medication 50 MILLIGRAM(S): at 20:30

## 2023-04-06 NOTE — BH INPATIENT PSYCHIATRY PROGRESS NOTE - CURRENT MEDICATION
MEDICATIONS  (STANDING):  divalproex  milliGRAM(s) Oral at bedtime  risperiDONE   Tablet 4 milliGRAM(s) Oral at bedtime    MEDICATIONS  (PRN):  diphenhydrAMINE 50 milliGRAM(s) Oral every 4 hours PRN EPS  diphenhydrAMINE Injectable 50 milliGRAM(s) IntraMuscular once PRN EPS  haloperidol     Tablet 5 milliGRAM(s) Oral every 4 hours PRN agitation from psychosis  haloperidol    Injectable 5 milliGRAM(s) IntraMuscular once PRN combative behavior from psychosis  hydrOXYzine hydrochloride 50 milliGRAM(s) Oral every 6 hours PRN anxiety  LORazepam   Injectable 2 milliGRAM(s) IntraMuscular once PRN combative behavior 2/2 psychosis  melatonin. 3 milliGRAM(s) Oral at bedtime PRN Insomnia  nicotine  Polacrilex Gum 2 milliGRAM(s) Oral every 2 hours PRN nicotine   MEDICATIONS  (STANDING):  divalproex  milliGRAM(s) Oral <User Schedule>  risperiDONE   Tablet 4 milliGRAM(s) Oral at bedtime    MEDICATIONS  (PRN):  diphenhydrAMINE 50 milliGRAM(s) Oral every 4 hours PRN EPS  diphenhydrAMINE Injectable 50 milliGRAM(s) IntraMuscular once PRN EPS  haloperidol     Tablet 5 milliGRAM(s) Oral every 4 hours PRN agitation from psychosis  haloperidol    Injectable 5 milliGRAM(s) IntraMuscular once PRN combative behavior from psychosis  hydrOXYzine hydrochloride 50 milliGRAM(s) Oral every 6 hours PRN anxiety  LORazepam   Injectable 2 milliGRAM(s) IntraMuscular once PRN combative behavior 2/2 psychosis  melatonin. 3 milliGRAM(s) Oral at bedtime PRN Insomnia  nicotine  Polacrilex Gum 2 milliGRAM(s) Oral every 2 hours PRN nicotine   MEDICATIONS  (STANDING):  risperiDONE   Tablet 4 milliGRAM(s) Oral at bedtime    MEDICATIONS  (PRN):  diphenhydrAMINE 50 milliGRAM(s) Oral every 4 hours PRN EPS  diphenhydrAMINE Injectable 50 milliGRAM(s) IntraMuscular once PRN EPS  haloperidol     Tablet 5 milliGRAM(s) Oral every 4 hours PRN agitation from psychosis  haloperidol    Injectable 5 milliGRAM(s) IntraMuscular once PRN combative behavior from psychosis  hydrOXYzine hydrochloride 50 milliGRAM(s) Oral every 6 hours PRN anxiety  LORazepam   Injectable 2 milliGRAM(s) IntraMuscular once PRN combative behavior 2/2 psychosis  melatonin. 3 milliGRAM(s) Oral at bedtime PRN Insomnia  nicotine  Polacrilex Gum 2 milliGRAM(s) Oral every 2 hours PRN nicotine

## 2023-04-06 NOTE — BH INPATIENT PSYCHIATRY PROGRESS NOTE - NSBHASSESSSUMMFT_PSY_ALL_CORE
Patient is a 32-year-old male; unclear current social hx, hx of homelessness; PPHx schizophrenia, per PSYCKES of various dx (mood, psychotic, anxiety, adjustment, substance disorders), prior admissions, denies hx of suicidality or violence; no known PMHx; BIB EMS; psychiatry consulted for evaluation; transferred to [place] [id number] for ongoing psychiatric care.     Working diagnosis of schizophrenia spectrum illness per history including schizophrenia, schizoaffective disorder, substance-induced mood/psychotic disorder; R/O personality disorder.    Currently, patient reports some paranoia and thought confusion, denying all other psychotic symptoms. Of note, pt with multiple other MRNs in [place] and 3 ED visits in 24 hours. Assessment limited as pt is a limited historian and minimally engaged.  Pt with hx of multiple prior inpatient admissions but also hx of malingering for food/shelter. Currently denies any thoughts of self harm, passive or active SI, or HI. No known recent substance use, utox negative and blood alcohol negative. Start risperidone, plan to titrate.    Continued inpatient admission required for safety, stabilization, medication optimization, safe discharge planning.      Plan  1. Legal: Admitted on 9.27 status  2. Safety: No reported SI/SIB/HI/VI currently on unit; continue routine observation.   -psychotropic PRN medications for safety  3. Psychiatric:   -risperidone 4 mg qHS for psychosis; plan to titrate as tolerated; R/B/A and side effects discussed  -Initiate Depakote ER 500mg qhs  -PRN melatonin 3 mg qHS for insomnia  4. Therapy: group & milieu therapy as appropriate  5. Medical: No acute medical needs identified  -CBC & CMP WNL, TSH 0.63, utox negative, blood alcohol negative  6. Collateral: ongoing  7. Disposition: When stable

## 2023-04-06 NOTE — BH INPATIENT PSYCHIATRY PROGRESS NOTE - NSBHFUPINTERVALHXFT_PSY_A_CORE
Patient seen and interviewed in hallway. Patient received PRN Haldol, Benadryl, and Atarax last night at 20:45 for agitation. Patient slept well and ate this morning. Patient discussed in treatment team, discussed treatment goals, clinical progress, and medication changes.  This morning he is very disorganized, pacing intensely around unit, and gesturing with arms. Patient appears to be responding to internal stimuli, speaking and laughing very loudly to himself. When asked what is troubling him this morning patient repeated that he "just wants to go home". Of note, patient has been asking for printouts of  fashion items to look at.    He initially refused PO PRNs this morning but after discussion was amenable to taking PO Haldol 5mg and Benadryl 50mg for his agitation administered at 10:11a. Continue to monitor for violent behaviors and risk to self/others. Low threshold for further intervention if indicated. Patient encouraged to continue taking medication and taking care of ADLs to best of ability. Patient endorses continued AH, denies VH. VSS. Patient seen and interviewed in hallway. Patient received PRN Haldol, Benadryl, and Atarax last night at 20:45 for agitation. Patient slept well and ate this morning. Patient discussed in treatment team, discussed treatment goals, clinical progress, and medication changes.  This morning he is very disorganized, pacing intensely around unit, and gesturing with arms. Patient appears to be responding to internal stimuli, speaking and laughing very loudly to himself. When asked what is troubling him this morning patient repeated that he "just wants to go home". Of note, patient has been asking for printouts of  fashion items to look at.    He initially refused PO PRNs this morning but after discussion was amenable to taking PO Haldol 5mg and Benadryl 50mg for his agitation administered at 10:11a. Continue to monitor for violent behaviors and risk to self/others. Low threshold for further intervention if indicated. Planned to increase Depakote to 500mg BID today. Patient encouraged to continue taking medication and taking care of ADLs to best of ability. Patient endorses continued AH, denies VH. VSS. Patient seen and interviewed in [facility]. Patient received PRN Haldol, Benadryl, and Atarax last night at 20:45 for agitation. Patient slept well and ate this morning. Patient discussed in treatment team, discussed treatment goals, clinical progress, and medication changes.  This morning he is very disorganized, pacing intensely around unit, and gesturing with arms. Patient appears to be responding to internal stimuli, speaking and laughing very loudly to himself. When asked what is troubling him this morning patient repeated that he "just wants to go home". Of note, patient has been asking for printouts of  fashion items to look at.When asked about his symptoms pt rubs his head, unable to elaborate on symptoms. Patient exhibiting increasing irritability, he is heard cursing loudly to himself, has intense stare, and is internally preoccupied    He initially refused PO PRNs this morning but after discussion was amenable to taking PO Haldol 5mg and Benadryl 50mg for his agitation administered at 10:11a. Continue to monitor for violent behaviors and risk to self/others. Low threshold for further intervention if indicated. Planned to increase Depakote to 500mg BID today. Patient encouraged to continue taking medication and taking care of ADLs to best of ability. Patient endorses continued AH, denies VH. VSS.

## 2023-04-06 NOTE — BH INPATIENT PSYCHIATRY PROGRESS NOTE - NSBHCHARTREVIEWVS_PSY_A_CORE FT
Vital Signs Last 24 Hrs  T(C): 36.7 (04-06-23 @ 06:28), Max: 37.2 (04-05-23 @ 19:00)  T(F): 98.1 (04-06-23 @ 06:28), Max: 98.9 (04-05-23 @ 19:00)  HR: 73 (04-05-23 @ 08:08) (73 - 73)  BP: 108/67 (04-05-23 @ 08:08) (108/67 - 108/67)  BP(mean): --  RR: --  SpO2: --    Orthostatic VS  04-05-23 @ 19:00  Lying BP: --/-- HR: --  Sitting BP: 103/76 HR: 70  Standing BP: 97/68 HR: 70  Site: --  Mode: --  Orthostatic VS  04-04-23 @ 19:20  Lying BP: --/-- HR: --  Sitting BP: 101/76 HR: 78  Standing BP: 100/68 HR: 74  Site: --  Mode: --  Orthostatic VS  04-04-23 @ 08:05  Lying BP: --/-- HR: --  Sitting BP: 108/69 HR: 100  Standing BP: 100/65 HR: 104  Site: --  Mode: --   Vital Signs Last 24 Hrs  T(C): 36.7 (04-06-23 @ 06:28), Max: 37.2 (04-05-23 @ 19:00)  T(F): 98.1 (04-06-23 @ 06:28), Max: 98.9 (04-05-23 @ 19:00)  HR: --  BP: --  BP(mean): --  RR: --  SpO2: --    Orthostatic VS  04-06-23 @ 08:13  Lying BP: --/-- HR: --  Sitting BP: 120/77 HR: 75  Standing BP: 130/88 HR: 86  Site: --  Mode: --  Orthostatic VS  04-05-23 @ 19:00  Lying BP: --/-- HR: --  Sitting BP: 103/76 HR: 70  Standing BP: 97/68 HR: 70  Site: --  Mode: --  Orthostatic VS  04-04-23 @ 19:20  Lying BP: --/-- HR: --  Sitting BP: 101/76 HR: 78  Standing BP: 100/68 HR: 74  Site: --  Mode: --   Vital Signs Last 24 Hrs  T(C): 36.1 (04-06-23 @ 14:49), Max: 37.2 (04-05-23 @ 19:00)  T(F): 97 (04-06-23 @ 14:49), Max: 98.9 (04-05-23 @ 19:00)  HR: --  BP: --  BP(mean): --  RR: --  SpO2: --    Orthostatic VS  04-06-23 @ 08:13  Lying BP: --/-- HR: --  Sitting BP: 120/77 HR: 75  Standing BP: 130/88 HR: 86  Site: --  Mode: --  Orthostatic VS  04-05-23 @ 19:00  Lying BP: --/-- HR: --  Sitting BP: 103/76 HR: 70  Standing BP: 97/68 HR: 70  Site: --  Mode: --  Orthostatic VS  04-04-23 @ 19:20  Lying BP: --/-- HR: --  Sitting BP: 101/76 HR: 78  Standing BP: 100/68 HR: 74  Site: --  Mode: --

## 2023-04-06 NOTE — BH INPATIENT PSYCHIATRY PROGRESS NOTE - NSBHMETABOLIC_PSY_ALL_CORE_FT
BMI: BMI (kg/m2): 30.4 (03-31-23 @ 18:58)  HbA1c:   Glucose:   BP: 108/67 (04-05-23 @ 08:08) (108/67 - 108/67)  Lipid Panel:

## 2023-04-07 RX ADMIN — Medication 3 MILLIGRAM(S): at 20:55

## 2023-04-07 RX ADMIN — RISPERIDONE 4 MILLIGRAM(S): 4 TABLET ORAL at 20:53

## 2023-04-07 RX ADMIN — Medication 50 MILLIGRAM(S): at 20:54

## 2023-04-07 RX ADMIN — DIVALPROEX SODIUM 750 MILLIGRAM(S): 500 TABLET, DELAYED RELEASE ORAL at 09:03

## 2023-04-07 RX ADMIN — HALOPERIDOL DECANOATE 5 MILLIGRAM(S): 100 INJECTION INTRAMUSCULAR at 20:54

## 2023-04-07 RX ADMIN — DIVALPROEX SODIUM 750 MILLIGRAM(S): 500 TABLET, DELAYED RELEASE ORAL at 20:53

## 2023-04-07 NOTE — BH INPATIENT PSYCHIATRY PROGRESS NOTE - CURRENT MEDICATION
MEDICATIONS  (STANDING):  divalproex  milliGRAM(s) Oral <User Schedule>  risperiDONE   Tablet 4 milliGRAM(s) Oral at bedtime    MEDICATIONS  (PRN):  diphenhydrAMINE 50 milliGRAM(s) Oral every 4 hours PRN EPS  diphenhydrAMINE Injectable 50 milliGRAM(s) IntraMuscular once PRN EPS  haloperidol     Tablet 5 milliGRAM(s) Oral every 4 hours PRN agitation from psychosis  haloperidol    Injectable 5 milliGRAM(s) IntraMuscular once PRN combative behavior from psychosis  hydrOXYzine hydrochloride 50 milliGRAM(s) Oral every 6 hours PRN anxiety  LORazepam   Injectable 2 milliGRAM(s) IntraMuscular once PRN combative behavior 2/2 psychosis  melatonin. 3 milliGRAM(s) Oral at bedtime PRN Insomnia  nicotine  Polacrilex Gum 2 milliGRAM(s) Oral every 2 hours PRN nicotine

## 2023-04-07 NOTE — BH INPATIENT PSYCHIATRY PROGRESS NOTE - NSBHATTESTBILLING_PSY_A_CORE
00426-Sjhtkiysuy OBS or IP - moderate complexity OR 35-49 mins 93469-Zibpqdlikl OBS or IP - low complexity OR 25-34 mins

## 2023-04-07 NOTE — BH INPATIENT PSYCHIATRY PROGRESS NOTE - NSDCCRITERIA_PSY_ALL_CORE
When pt is no longer an acute or imminent risk of harm to self or others, and is able to care for self safely, pt may then be discharged. No

## 2023-04-07 NOTE — BH INPATIENT PSYCHIATRY PROGRESS NOTE - NSBHFUPINTERVALHXFT_PSY_A_CORE
Patient seen and interviewed in hallway. Patient received PRN Haldol 5mg and Benadryl 50mg last night at 20:30 for agitation. Patient slept well and ate this morning. At time of approach for interview patient was in hallway squatting and staring into trash can outside of his room. When asked if he was okay he described that "I'm thinking". He denied nausea or feeling like he was going to vomit. Patient appeared very disorganized, seen pacing throughout unit, speaking to himself, and has intense stare. Patient appears to be responding to internal stimuli, laughing and occasionally speaking to himself. When asked how he was feeling this morning patient stated he "just wants to go home" when asked to elaborate on where home is or where he was before admission he became upset and revealed "I have no home that's the problem". He was unable to remember if he had been staying in a shelter prior to admission.    When asked about his symptoms patient rubs his head, states that "something is wrong with his head" but he cannot elaborate and doesn't want to discuss it further because he "just wants to go home". Patient became agitated and stated "I don't want to answer any ore questions" and walked away to room abruptly. Continue to monitor for violent behaviors and risk to self/others. Low threshold for further intervention if indicated. Patient currently on Depakote ER 750mg qd and risperidone 4mg qHS. Patient remains compliant with medications and denies SE. Patient encouraged to continued taking medication and taking care of ADLs to best of ability. Patient endorses continued AH, denies VH. VSS. Patient seen and interviewed in hallway. Patient received PRN Haldol 5mg and Benadryl 50mg last night at 20:30 for agitation. Patient slept well and ate this morning. At time of approach for interview patient was in hallway squatting and staring into trash can outside of his room. When asked if he was okay he described that "I'm thinking". He denied nausea or feeling like he was going to vomit. Patient appeared very disorganized, seen pacing throughout unit, speaking to himself, and has intense stare. Patient appears to be responding to internal stimuli, laughing and occasionally speaking to himself. When asked how he was feeling this morning patient stated he "just wants to go home" when asked to elaborate on where home is or where he was before admission he became upset and revealed "I have no home that's the problem". He was unable to remember if he had been staying in a shelter prior to admission.    When asked about his symptoms patient rubs his head, states that "something is wrong with his head" but he cannot elaborate and doesn't want to discuss it further because he "just wants to go home". Patient became agitated and stated "I don't want to answer any more questions" and walked away to room abruptly. Continue to monitor for violent behaviors and risk to self/others. Low threshold for further intervention if indicated. Patient currently on Depakote ER 750mg qd and risperidone 4mg qHS. Patient remains compliant with medications and denies SE. Patient encouraged to continued taking medication and taking care of ADLs to best of ability. Patient endorses continued AH, denies VH. VSS.

## 2023-04-07 NOTE — BH INPATIENT PSYCHIATRY PROGRESS NOTE - NSBHMETABOLIC_PSY_ALL_CORE_FT
BMI: BMI (kg/m2): 30.4 (03-31-23 @ 18:58)  HbA1c:   Glucose:   BP: 105/60 (04-07-23 @ 08:05) (105/60 - 108/67)  Lipid Panel:

## 2023-04-07 NOTE — BH INPATIENT PSYCHIATRY PROGRESS NOTE - NSBHASSESSSUMMFT_PSY_ALL_CORE
Patient is a 32-year-old male; unclear current social hx, hx of homelessness; PPHx schizophrenia, per PSYCKES of various dx (mood, psychotic, anxiety, adjustment, substance disorders), prior admissions, denies hx of suicidality or violence; no known PMHx; BIB EMS; psychiatry consulted for evaluation; transferred to [place] [address] for ongoing psychiatric care.     Working diagnosis of schizophrenia spectrum illness per history including schizophrenia, schizoaffective disorder, substance-induced mood/psychotic disorder; R/O personality disorder.    Currently, patient reports some paranoia and thought confusion, denying all other psychotic symptoms. Of note, pt with multiple other MRNs in [place] and 3 ED visits in 24 hours. Assessment limited as pt is a limited historian and minimally engaged.  Pt with hx of multiple prior inpatient admissions but also hx of malingering for food/shelter. Currently denies any thoughts of self harm, passive or active SI, or HI. No known recent substance use, utox negative and blood alcohol negative. Start risperidone, plan to titrate.    Continued inpatient admission required for safety, stabilization, medication optimization, safe discharge planning.      Plan  1. Legal: Admitted on 9.27 status  2. Safety: No reported SI/SIB/HI/VI currently on unit; continue routine observation.   -psychotropic PRN medications for safety  3. Psychiatric:   -risperidone 4 mg qHS for psychosis; plan to titrate as tolerated; R/B/A and side effects discussed  -Initiate Depakote ER 500mg qhs  -PRN melatonin 3 mg qHS for insomnia  4. Therapy: group & milieu therapy as appropriate  5. Medical: No acute medical needs identified  -CBC & CMP WNL, TSH 0.63, utox negative, blood alcohol negative  6. Collateral: ongoing  7. Disposition: When stable

## 2023-04-07 NOTE — BH INPATIENT PSYCHIATRY PROGRESS NOTE - NSBHCHARTREVIEWVS_PSY_A_CORE FT
Vital Signs Last 24 Hrs  T(C): 36.3 (04-07-23 @ 06:09), Max: 36.3 (04-07-23 @ 06:09)  T(F): 97.4 (04-07-23 @ 06:09), Max: 97.4 (04-07-23 @ 06:09)  HR: 75 (04-07-23 @ 08:05) (75 - 75)  BP: 105/60 (04-07-23 @ 08:05) (105/60 - 105/60)  BP(mean): --  RR: 18 (04-06-23 @ 20:32) (18 - 18)  SpO2: --    Orthostatic VS  04-06-23 @ 08:13  Lying BP: --/-- HR: --  Sitting BP: 120/77 HR: 75  Standing BP: 130/88 HR: 86  Site: --  Mode: --  Orthostatic VS  04-05-23 @ 19:00  Lying BP: --/-- HR: --  Sitting BP: 103/76 HR: 70  Standing BP: 97/68 HR: 70  Site: --  Mode: --   Vital Signs Last 24 Hrs  T(C): 36.2 (04-07-23 @ 14:55), Max: 36.3 (04-07-23 @ 06:09)  T(F): 97.2 (04-07-23 @ 14:55), Max: 97.4 (04-07-23 @ 06:09)  HR: 75 (04-07-23 @ 08:05) (75 - 75)  BP: 105/60 (04-07-23 @ 08:05) (105/60 - 105/60)  BP(mean): --  RR: 18 (04-06-23 @ 20:32) (18 - 18)  SpO2: --    Orthostatic VS  04-06-23 @ 08:13  Lying BP: --/-- HR: --  Sitting BP: 120/77 HR: 75  Standing BP: 130/88 HR: 86  Site: --  Mode: --  Orthostatic VS  04-05-23 @ 19:00  Lying BP: --/-- HR: --  Sitting BP: 103/76 HR: 70  Standing BP: 97/68 HR: 70  Site: --  Mode: --

## 2023-04-07 NOTE — BH INPATIENT PSYCHIATRY PROGRESS NOTE - NSBHATTESTTYPEVISIT_PSY_A_CORE
On-site Attending supervising JOHAN (99XXX codes) On-site Attending with Resident/Fellow/Student and JOHAN (99XXX codes)

## 2023-04-08 RX ADMIN — Medication 50 MILLIGRAM(S): at 20:59

## 2023-04-08 RX ADMIN — DIVALPROEX SODIUM 750 MILLIGRAM(S): 500 TABLET, DELAYED RELEASE ORAL at 08:48

## 2023-04-08 RX ADMIN — Medication 3 MILLIGRAM(S): at 21:00

## 2023-04-08 RX ADMIN — DIVALPROEX SODIUM 750 MILLIGRAM(S): 500 TABLET, DELAYED RELEASE ORAL at 21:01

## 2023-04-08 RX ADMIN — HALOPERIDOL DECANOATE 5 MILLIGRAM(S): 100 INJECTION INTRAMUSCULAR at 20:59

## 2023-04-08 RX ADMIN — RISPERIDONE 4 MILLIGRAM(S): 4 TABLET ORAL at 20:59

## 2023-04-09 RX ADMIN — Medication 3 MILLIGRAM(S): at 20:50

## 2023-04-09 RX ADMIN — Medication 50 MILLIGRAM(S): at 20:49

## 2023-04-09 RX ADMIN — DIVALPROEX SODIUM 750 MILLIGRAM(S): 500 TABLET, DELAYED RELEASE ORAL at 09:19

## 2023-04-09 RX ADMIN — HALOPERIDOL DECANOATE 5 MILLIGRAM(S): 100 INJECTION INTRAMUSCULAR at 20:50

## 2023-04-09 RX ADMIN — DIVALPROEX SODIUM 750 MILLIGRAM(S): 500 TABLET, DELAYED RELEASE ORAL at 20:50

## 2023-04-09 RX ADMIN — HALOPERIDOL DECANOATE 5 MILLIGRAM(S): 100 INJECTION INTRAMUSCULAR at 09:19

## 2023-04-09 RX ADMIN — RISPERIDONE 4 MILLIGRAM(S): 4 TABLET ORAL at 20:50

## 2023-04-10 LAB
A1C WITH ESTIMATED AVERAGE GLUCOSE RESULT: 5.1 % — SIGNIFICANT CHANGE UP (ref 4–5.6)
CHOLEST SERPL-MCNC: 160 MG/DL — SIGNIFICANT CHANGE UP
ESTIMATED AVERAGE GLUCOSE: 100 — SIGNIFICANT CHANGE UP
HDLC SERPL-MCNC: 52 MG/DL — SIGNIFICANT CHANGE UP
LIPID PNL WITH DIRECT LDL SERPL: 87 MG/DL — SIGNIFICANT CHANGE UP
NON HDL CHOLESTEROL: 108 MG/DL — SIGNIFICANT CHANGE UP
TRIGL SERPL-MCNC: 105 MG/DL — SIGNIFICANT CHANGE UP

## 2023-04-10 RX ORDER — RISPERIDONE 4 MG/1
1 TABLET ORAL DAILY
Refills: 0 | Status: DISCONTINUED | OUTPATIENT
Start: 2023-04-10 | End: 2023-04-21

## 2023-04-10 RX ORDER — DIVALPROEX SODIUM 500 MG/1
1000 TABLET, DELAYED RELEASE ORAL
Refills: 0 | Status: DISCONTINUED | OUTPATIENT
Start: 2023-04-10 | End: 2023-04-17

## 2023-04-10 RX ADMIN — Medication 3 MILLIGRAM(S): at 21:20

## 2023-04-10 RX ADMIN — HALOPERIDOL DECANOATE 5 MILLIGRAM(S): 100 INJECTION INTRAMUSCULAR at 21:20

## 2023-04-10 RX ADMIN — RISPERIDONE 4 MILLIGRAM(S): 4 TABLET ORAL at 21:19

## 2023-04-10 RX ADMIN — RISPERIDONE 1 MILLIGRAM(S): 4 TABLET ORAL at 08:52

## 2023-04-10 RX ADMIN — Medication 50 MILLIGRAM(S): at 21:20

## 2023-04-10 RX ADMIN — Medication 50 MILLIGRAM(S): at 08:52

## 2023-04-10 RX ADMIN — DIVALPROEX SODIUM 1000 MILLIGRAM(S): 500 TABLET, DELAYED RELEASE ORAL at 08:52

## 2023-04-10 RX ADMIN — HALOPERIDOL DECANOATE 5 MILLIGRAM(S): 100 INJECTION INTRAMUSCULAR at 08:52

## 2023-04-10 RX ADMIN — DIVALPROEX SODIUM 1000 MILLIGRAM(S): 500 TABLET, DELAYED RELEASE ORAL at 21:19

## 2023-04-10 NOTE — BH INPATIENT PSYCHIATRY PROGRESS NOTE - CURRENT MEDICATION
MEDICATIONS  (STANDING):  divalproex ER 1000 milliGRAM(s) Oral <User Schedule>  risperiDONE   Tablet 4 milliGRAM(s) Oral at bedtime  risperiDONE   Tablet 1 milliGRAM(s) Oral daily    MEDICATIONS  (PRN):  diphenhydrAMINE 50 milliGRAM(s) Oral every 4 hours PRN EPS  diphenhydrAMINE Injectable 50 milliGRAM(s) IntraMuscular once PRN EPS  haloperidol     Tablet 5 milliGRAM(s) Oral every 4 hours PRN agitation from psychosis  haloperidol    Injectable 5 milliGRAM(s) IntraMuscular once PRN combative behavior from psychosis  hydrOXYzine hydrochloride 50 milliGRAM(s) Oral every 6 hours PRN anxiety  LORazepam   Injectable 2 milliGRAM(s) IntraMuscular once PRN Severe aggression  melatonin. 3 milliGRAM(s) Oral at bedtime PRN Insomnia  nicotine  Polacrilex Gum 2 milliGRAM(s) Oral every 2 hours PRN nicotine   MEDICATIONS  (STANDING):  divalproex ER 2000 milliGRAM(s) Oral at bedtime  risperiDONE   Tablet 1 milliGRAM(s) Oral daily  risperiDONE   Tablet 4 milliGRAM(s) Oral at bedtime    MEDICATIONS  (PRN):  diphenhydrAMINE 50 milliGRAM(s) Oral every 4 hours PRN EPS  diphenhydrAMINE Injectable 50 milliGRAM(s) IntraMuscular once PRN EPS  haloperidol     Tablet 5 milliGRAM(s) Oral every 4 hours PRN agitation from psychosis  haloperidol    Injectable 5 milliGRAM(s) IntraMuscular once PRN combative behavior from psychosis  hydrOXYzine hydrochloride 50 milliGRAM(s) Oral every 6 hours PRN anxiety  LORazepam   Injectable 2 milliGRAM(s) IntraMuscular once PRN aggression  melatonin. 3 milliGRAM(s) Oral at bedtime PRN Insomnia  nicotine  Polacrilex Gum 2 milliGRAM(s) Oral every 2 hours PRN nicotine

## 2023-04-10 NOTE — BH INPATIENT PSYCHIATRY PROGRESS NOTE - NSBHCHARTREVIEWVS_PSY_A_CORE FT
Vital Signs Last 24 Hrs  T(C): 36.8 (04-10-23 @ 06:51), Max: 36.8 (04-10-23 @ 06:51)  T(F): 98.2 (04-10-23 @ 06:51), Max: 98.2 (04-10-23 @ 06:51)  HR: --  BP: --  BP(mean): --  RR: 17 (04-09-23 @ 20:11) (17 - 17)  SpO2: --    Orthostatic VS  04-10-23 @ 07:58  Lying BP: --/-- HR: --  Sitting BP: 98/63 HR: 60  Standing BP: 95/60 HR: 81  Site: upper left arm  Mode: electronic  Orthostatic VS  04-09-23 @ 19:00  Lying BP: --/-- HR: --  Sitting BP: 122/61 HR: 90  Standing BP: 133/81 HR: 85  Site: --  Mode: electronic  Orthostatic VS  04-09-23 @ 08:08  Lying BP: 119/77 HR: 61  Sitting BP: --/-- HR: --  Standing BP: --/-- HR: --  Site: --  Mode: electronic   Vital Signs Last 24 Hrs  T(C): 36.6 (04-21-23 @ 06:01), Max: 36.6 (04-21-23 @ 06:01)  T(F): 97.8 (04-21-23 @ 06:01), Max: 97.8 (04-21-23 @ 06:01)  HR: --  BP: --  BP(mean): --  RR: --  SpO2: --    Orthostatic VS  04-21-23 @ 08:10  Lying BP: --/-- HR: --  Sitting BP: 117/94 HR: 80  Standing BP: 114/65 HR: 97  Site: --  Mode: --  Orthostatic VS  04-20-23 @ 19:28  Lying BP: --/-- HR: --  Sitting BP: 130/75 HR: 77  Standing BP: 123/78 HR: 85  Site: --  Mode: --  Orthostatic VS  04-20-23 @ 08:25  Lying BP: --/-- HR: --  Sitting BP: 104/66 HR: 66  Standing BP: 112/70 HR: 71  Site: --  Mode: --

## 2023-04-10 NOTE — BH INPATIENT PSYCHIATRY PROGRESS NOTE - PRN MEDS
MEDICATIONS  (PRN):  diphenhydrAMINE 50 milliGRAM(s) Oral every 4 hours PRN EPS  diphenhydrAMINE Injectable 50 milliGRAM(s) IntraMuscular once PRN EPS  haloperidol     Tablet 5 milliGRAM(s) Oral every 4 hours PRN agitation from psychosis  haloperidol    Injectable 5 milliGRAM(s) IntraMuscular once PRN combative behavior from psychosis  hydrOXYzine hydrochloride 50 milliGRAM(s) Oral every 6 hours PRN anxiety  LORazepam   Injectable 2 milliGRAM(s) IntraMuscular once PRN Severe aggression  melatonin. 3 milliGRAM(s) Oral at bedtime PRN Insomnia  nicotine  Polacrilex Gum 2 milliGRAM(s) Oral every 2 hours PRN nicotine   MEDICATIONS  (PRN):  diphenhydrAMINE 50 milliGRAM(s) Oral every 4 hours PRN EPS  diphenhydrAMINE Injectable 50 milliGRAM(s) IntraMuscular once PRN EPS  haloperidol     Tablet 5 milliGRAM(s) Oral every 4 hours PRN agitation from psychosis  haloperidol    Injectable 5 milliGRAM(s) IntraMuscular once PRN combative behavior from psychosis  hydrOXYzine hydrochloride 50 milliGRAM(s) Oral every 6 hours PRN anxiety  LORazepam   Injectable 2 milliGRAM(s) IntraMuscular once PRN aggression  melatonin. 3 milliGRAM(s) Oral at bedtime PRN Insomnia  nicotine  Polacrilex Gum 2 milliGRAM(s) Oral every 2 hours PRN nicotine

## 2023-04-10 NOTE — BH INPATIENT PSYCHIATRY PROGRESS NOTE - NSBHFUPINTERVALHXFT_PSY_A_CORE
Patient seen and interviewed in dayroom. Patient received Haldol 5mg, Benadryl 50mg, Atarax 50mg, and Melatonin 3mg last night at 20:50 for agitation and insomnia. No other overnight concerns. Patient discussed in treatment team, discussed treatment goals, clinical progress, and medication changes. Patient slept well and ate well this morning. Patient reports that he "feels well" and is "ready to leave" this morning. Patient has previously reported that "something is wrong with his head" and when asked about these symptoms this morning he states that he is feeling better and "it doesn't matter because I just want to go home".    He appears more calm this morning and was not pacing throughout unit as rapidly as before. Continues to respond to internal stimuli, laughing and occasionally speaking to himself. Patient expresses desire to go home and clarified that prior to admission he was not living in a home or shelter and that he was "walking the streets trying to figure out what to do". Pt encouraged to continue taking medication, engaging in groups, and taking care of ADLs to best of ability.     Patient is currently tolerating increase of Depakote to 1000mg qd and Risperidone 1mg in AM and 4mg qHS. Patient remains compliant with medications and denies SE. Denies AH/VH. VSS.

## 2023-04-10 NOTE — BH INPATIENT PSYCHIATRY PROGRESS NOTE - NSBHATTESTCOMMENTATTENDFT_PSY_A_CORE
Care was discussed and reviewed in interdisciplinary treatment team. JOHAN supervised student and independently performed history, exam and medical decision making.     -----    Attending: "I independently performed the documented medical decision making" indicates that I discussed with the treatment team and reviewed and revised treatment plan including medications with JOHAN based on history and exam performed by JOHAN.

## 2023-04-10 NOTE — BH INPATIENT PSYCHIATRY PROGRESS NOTE - NSBHMETABOLIC_PSY_ALL_CORE_FT
BMI: BMI (kg/m2): 30.4 (03-31-23 @ 18:58)  HbA1c:   Glucose:   BP: --  Lipid Panel:  BMI: BMI (kg/m2): 30.4 (03-31-23 @ 18:58)  HbA1c: A1C with Estimated Average Glucose Result: 5.1 % (04-10-23 @ 08:20)    Glucose:   BP: --  Lipid Panel: Date/Time: 04-10-23 @ 08:20  Cholesterol, Serum: 160  Direct LDL: --  HDL Cholesterol, Serum: 52  Total Cholesterol/HDL Ration Measurement: --  Triglycerides, Serum: 105

## 2023-04-11 RX ADMIN — Medication 3 MILLIGRAM(S): at 20:44

## 2023-04-11 RX ADMIN — RISPERIDONE 1 MILLIGRAM(S): 4 TABLET ORAL at 08:18

## 2023-04-11 RX ADMIN — DIVALPROEX SODIUM 1000 MILLIGRAM(S): 500 TABLET, DELAYED RELEASE ORAL at 08:18

## 2023-04-11 RX ADMIN — RISPERIDONE 4 MILLIGRAM(S): 4 TABLET ORAL at 20:44

## 2023-04-11 RX ADMIN — HALOPERIDOL DECANOATE 5 MILLIGRAM(S): 100 INJECTION INTRAMUSCULAR at 22:29

## 2023-04-11 RX ADMIN — DIVALPROEX SODIUM 1000 MILLIGRAM(S): 500 TABLET, DELAYED RELEASE ORAL at 20:43

## 2023-04-11 RX ADMIN — Medication 50 MILLIGRAM(S): at 22:29

## 2023-04-11 NOTE — BH INPATIENT PSYCHIATRY PROGRESS NOTE - NSBHASSESSSUMMFT_PSY_ALL_CORE
Patient is a 32-year-old male; unclear current social hx, hx of homelessness; PPHx schizophrenia, per PSYCKES of various dx (mood, psychotic, anxiety, adjustment, substance disorders), prior admissions, denies hx of suicidality or violence; no known PMHx; BIB EMS; psychiatry consulted for evaluation; transferred to [place] [address] for ongoing psychiatric care.     Working diagnosis of schizophrenia spectrum illness per history including schizophrenia, schizoaffective disorder, substance-induced mood/psychotic disorder; R/O personality disorder.    Currently, patient reports some paranoia and thought confusion, denying all other psychotic symptoms. Of note, pt with multiple other MRNs in [place] and 3 ED visits in 24 hours. Assessment limited as pt is a limited historian and minimally engaged.  Pt with hx of multiple prior inpatient admissions but also hx of malingering for food/shelter. Currently denies any thoughts of self harm, passive or active SI, or HI. No known recent substance use, utox negative and blood alcohol negative. Start risperidone, plan to titrate.    Continued inpatient admission required for safety, stabilization, medication optimization, safe discharge planning.      Plan  1. Legal: Admitted on 9.27 status  2. Safety: No reported SI/SIB/HI/VI currently on unit; continue routine observation.   -psychotropic PRN medications for safety  3. Psychiatric:   -risperidone 1mg daily and  4 mg qHS for psychosis; plan to titrate as tolerated; R/B/A and side effects discussed  -Initiate Depakote ER 500mg qhs  -PRN melatonin 3 mg qHS for insomnia  4. Therapy: group & milieu therapy as appropriate  5. Medical: No acute medical needs identified  -CBC & CMP WNL, TSH 0.63, utox negative, blood alcohol negative  6. Collateral: ongoing  7. Disposition: When stable

## 2023-04-11 NOTE — BH INPATIENT PSYCHIATRY DISCHARGE NOTE - HOSPITAL COURSE
Patient is a 32-year-old single AA male; unclear current social hx, hx of homelessness; PPHx schizophrenia, per PSYCKES of various dx (mood, psychotic, anxiety, adjustment, substance disorders), prior admissions, denies hx of suicidality or violence; no known PMHx; BIB EMS for psychosis and bizarre behaviors.    On admission patient presented internally preoccupied, bizarre, talking to self.  Patient reported AH, multiple voices  but was  unable to describe the voices.  He  denied SI/HI.  Patient revealed to misuse of ecstacy.   Patient presented disorganized,  confused and was a  limited historian, unable to answer most interview quetions. stated "I don't know" to multiple interview  questions. Felt  that his thoughts are unclear and reported some paranoia, denies AHV. Denied any SI/HI on admission. Patient admitted for psychotic decompensation, BPRS 24 on admission.    Following a prolonged discussion regarding risks (including but not limited to EPS, weight gain, NMS, TD, metabolic syndrome) and benefits (attenuated psychosis, more organized thought process and behavior), patient consented to Risperdal trial, titrated to 4mg qhs, Depakote 1000mg BID.   Patient had no reported or observed adverse effects, such as akathisia, tremor, EPS.      Patient is a 32-year-old single AA male; unclear current social hx, hx of homelessness; PPHx schizophrenia, per PSYCKES of various dx (mood, psychotic, anxiety, adjustment, substance disorders), prior admissions, denies hx of suicidality or violence; no known PMHx; BIB EMS for psychosis and bizarre behaviors.    On admission patient presented internally preoccupied, bizarre, talking to self.  Patient reported AH, multiple voices  but was  unable to describe the voices.  He  denied SI/HI.  Patient revealed to misuse of ecstasy.   Patient presented disorganized,  confused and was a  limited historian, unable to answer most interview questions. stated "I don't know" to multiple interview  questions. Felt  that his thoughts are unclear and reported feeling paranoid, denies AHV. Denied any SI/HI on admission. Patient admitted for psychotic decompensation, BPRS 24 on admission.    Following a prolonged discussion regarding risks (including but not limited to EPS, weight gain, NMS, TD, metabolic syndrome) and benefits (attenuated psychosis, more organized thought process and behavior), patient consented to Risperdal trial, titrated to 4mg qhs, Depakote 1000mg BID.   Patient had no reported or observed adverse effects, such as akathisia, tremor, EPS.     During course of treatment patient remained guarded, poor historian. Overall fair behavior on unit, mostly isolated to his room.  Had some bouts of shouting but in context of psychotic symptoms, patient was observed talking loudly to self, RIS.  No behavioral concerns, did not require emergent intramuscular medications or seclusion / restraints.  He seldomly attended groups and milieu therapy mostly sat on the outskirts.  Patient reported improved sleep and appetite.  Patient was provided with extensive psycho-education regarding importance of compliance with medications.  Risk and benefits discussed, patient encouraged MEDINA however her declined.  Patient able to identify protective factors, patient is future-oriented.  Patient no longer required inpatient treatment and care. Patient is not judged to be an acute danger to self or others at this time. Appeared ready and stable to be discharged to lower level of care.  By day of discharge, the patient reported feeling  better.  There were no other acute risk factors that could be mitigated by further inpatient hospitalization.   PROCEDURES AND TREATMENT:  >Individual psychotherapy/CBT modality and group therapy  >Milieu therapy and supportive therapy  >Psychopharmacologic management.  >Motivational counseling  Patient labs were all WNL. Labs include---CBC/Chemistry/LFT/A1C/Lipid. Panel/TSH/CPK/HBA1-C/U-A/U-Tox/EKG/COVID  EKG: NSR   QT/QTC:   Valproic Acid: 86.20  Covid at discharge: nondetectable   Consultation: NA  Medical Issues: NA  Imaging: NA    Medications at discharge:   Psychiatric: Depakote 1000mg qhs, Risperdal 4mg qhs  Medical: NA    Problem Pertinent Review of Symptoms/Associated Signs and Symptoms: Patient is visible on the unit and is calm, cooperative, pleasant, AAOX3 upon approach. Immediate risk was minimized by inpatient admission to a safe environment with appropriate supervision and limited access to lethal means. On suicide risk assessment at the time of discharge, patient is at elevated chronic risk due the following factors: history of psychiatric illness, history on noncompliance, hx of multiple inpatient psychiatric admissions, and recent inpatient psychiatric discharge.    Protective factors include patient denying any anxiety, panic attacks, global insomnia, severe anhedonia.  Patient denies any suicidal/homicidal ideations, intent, or plan at the time of discharge.  Psycho education was provided to the patient prior to discharge. The patient was educated about the proper and safe use of medications as well as the risks and benefits of over and under dosing. Discussed pitfalls of treatment failure and reinforced taking medication as directed. Discussed not stopping medications when he feels better, and processed discontinuation symptoms. Patient was informed of risk/benefit of medication, alternative treatment and no treatment.  Patient was educated about side effects of his medications, including EPS, TD.  Patient verbalized understanding and in accord with aftercare recommendations. Given the mitigation of aforementioned acute risk factors and considerable protective factors, patient's acute risk for self-harm has been minimized and is currently low.   Future risk was minimized before discharge by treatment of anxiety/depressive symptoms, maximizing outpatient support, providing relevant patient education, discussing emergency procedures, and ensuring close follow-up. Patient denies all suicidal and aggressive/homicidal ideation, intent and plan, and, in view of demonstrated clinical improvement, is not judged to be an acute danger to self or others at this time. Although the patient remains at their chronic baseline risk of self-harm, such risk cannot be further ameliorated by continued inpatient treatment and the patient is therefore appropriate for discharge.   On the day of discharge, the patient’s mood and affect are normal/euthymic. Patient denies depressed mood and anxiety. Patient is not hopeless. Sleep and appetite are also normal (at baseline).  Pt’s motor performance and productivity of speech are WNL. Pt denies symptoms of psychosis including AVH, paranoia, delusions and is at baseline. Patient denies S/H/I/I/P.   There are no other acute risk factors that could be mitigated by further inpatient hospitalization.  Patient is not deemed to be an imminent danger to self or others at the time of discharge.  The patient has been instructed that should he develop thoughts of self-harm, suicidal thoughts, homicidal thoughts, aggression, agitation or any other distressing psychiatric symptoms, he should call 911 or go the emergency room. The patient has expressed understanding and is in agreement with the above plan.   Discharge Pan:  -Risk, benefits and alternatives discussed with patient. Patient verbalized understanding and in accord with aftercare recommendations.   - Patient given 4 weeks supply of medications. Risk, benefits and alternatives discussed with patient.   -Patient instructed to call 911 or go to nearest ER in case of emergency.   -Patient given Suicide Prevention Lifeline number 1-212-177-1655 and provided instructions on its use  -Patient will follow up with outpatient appointments                  Patient is a 32-year-old single AA male; unclear current social hx, hx of homelessness; PPHx schizophrenia, per PSYCKES of various dx (mood, psychotic, anxiety, adjustment, substance disorders), prior admissions, denies hx of suicidality or violence; no known PMHx; BIB EMS for psychosis and bizarre behaviors.    On admission patient presented internally preoccupied, bizarre, talking to self.  Patient reported AH, multiple voices  but was  unable to describe the voices.  He  denied SI/HI.  Patient revealed to misuse of ecstasy.   Patient presented disorganized,  confused and was a  limited historian, unable to answer most interview questions. stated "I don't know" to multiple interview  questions. Felt  that his thoughts are unclear and reported feeling paranoid, denies AHV. Denied any SI/HI on admission. Patient admitted for psychotic decompensation, BPRS 24 on admission.    Following a prolonged discussion regarding risks (including but not limited to EPS, weight gain, NMS, TD, metabolic syndrome) and benefits (attenuated psychosis, more organized thought process and behavior), patient consented to Risperdal trial, titrated to 1mg daily and 4mg qhs, Depakote 1000mg BID.   Patient had no reported or observed adverse effects, such as akathisia, tremor, EPS.     During course of treatment patient remained guarded, poor historian. Overall fair behavior on unit, mostly isolated to his room.  Had some bouts of shouting but in context of psychotic symptoms, patient was observed talking loudly to self, RIS.  No behavioral concerns, did not require emergent intramuscular medications or seclusion / restraints.  He seldomly attended groups and milieu therapy mostly sat on the outskirts.  Patient reported improved sleep and appetite.  Patient was provided with extensive psycho-education regarding importance of compliance with medications.  Risk and benefits discussed, patient encouraged MEDINA however her declined.  Patient able to identify protective factors, patient is future-oriented.  Patient no longer required inpatient treatment and care. Patient is not judged to be an acute danger to self or others at this time. Appeared ready and stable to be discharged to lower level of care.  By day of discharge, the patient reported feeling  better.  There were no other acute risk factors that could be mitigated by further inpatient hospitalization.   PROCEDURES AND TREATMENT:  >Individual psychotherapy/CBT modality and group therapy  >Milieu therapy and supportive therapy  >Psychopharmacologic management.  >Motivational counseling  Patient labs were all WNL. Labs include---CBC/Chemistry/LFT/A1C/Lipid. Panel/TSH/CPK/HBA1-C/U-A/U-Tox/EKG/COVID  EKG: NSR   QT/QTC:   Valproic Acid: 86.20  Covid at discharge: nondetectable   Consultation: NA  Medical Issues: NA  Imaging: NA    Medications at discharge:   Psychiatric: Depakote 2000mg qhs, Risperdal 1mg qdaily and 4mg qhs  Medical: NA    Problem Pertinent Review of Symptoms/Associated Signs and Symptoms: Patient is visible on the unit and is calm, cooperative, pleasant, AAOX3 upon approach. Immediate risk was minimized by inpatient admission to a safe environment with appropriate supervision and limited access to lethal means. On suicide risk assessment at the time of discharge, patient is at elevated chronic risk due the following factors: history of psychiatric illness, history on noncompliance, hx of multiple inpatient psychiatric admissions, and recent inpatient psychiatric discharge.    Protective factors include patient denying any anxiety, panic attacks, global insomnia, severe anhedonia.  Patient denies any suicidal/homicidal ideations, intent, or plan at the time of discharge.  Psycho education was provided to the patient prior to discharge. The patient was educated about the proper and safe use of medications as well as the risks and benefits of over and under dosing. Discussed pitfalls of treatment failure and reinforced taking medication as directed. Discussed not stopping medications when he feels better, and processed discontinuation symptoms. Patient was informed of risk/benefit of medication, alternative treatment and no treatment.  Patient was educated about side effects of his medications, including EPS, TD.  Patient verbalized understanding and in accord with aftercare recommendations. Given the mitigation of aforementioned acute risk factors and considerable protective factors, patient's acute risk for self-harm has been minimized and is currently low.   Future risk was minimized before discharge by treatment of anxiety/depressive symptoms, maximizing outpatient support, providing relevant patient education, discussing emergency procedures, and ensuring close follow-up. Patient denies all suicidal and aggressive/homicidal ideation, intent and plan, and, in view of demonstrated clinical improvement, is not judged to be an acute danger to self or others at this time. Although the patient remains at their chronic baseline risk of self-harm, such risk cannot be further ameliorated by continued inpatient treatment and the patient is therefore appropriate for discharge.   On the day of discharge, the patient’s mood and affect are normal/euthymic. Patient denies depressed mood and anxiety. Patient is not hopeless. Sleep and appetite are also normal (at baseline).  Pt’s motor performance and productivity of speech are WNL. Pt denies symptoms of psychosis including AVH, paranoia, delusions and is at baseline. Patient denies S/H/I/I/P.   There are no other acute risk factors that could be mitigated by further inpatient hospitalization.  Patient is not deemed to be an imminent danger to self or others at the time of discharge.  The patient has been instructed that should he develop thoughts of self-harm, suicidal thoughts, homicidal thoughts, aggression, agitation or any other distressing psychiatric symptoms, he should call 911 or go the emergency room. The patient has expressed understanding and is in agreement with the above plan.   Discharge Pan:  -Risk, benefits and alternatives discussed with patient. Patient verbalized understanding and in accord with aftercare recommendations.   - Patient given 4 weeks supply of medications. Risk, benefits and alternatives discussed with patient.   -Patient instructed to call 911 or go to nearest ER in case of emergency.   -Patient given Suicide Prevention Lifeline number [phone] and provided instructions on its use  -Patient will follow up with outpatient appointments

## 2023-04-11 NOTE — BH INPATIENT PSYCHIATRY DISCHARGE NOTE - ATTENDING DISCHARGE PHYSICAL EXAMINATION:
On day of discharge, patient is calm and cooperative, reports improved mood and anxiety, stress related to discharge though notes improvement during hospitalization including in response to medication, denying SEs, reporting intention to continue upon discharge, denies SI/HI and engages appropriately in safety planning, denies AH and no overt delusions, reviewed psychoeducation, patient continues to be help-seeking and future oriented, motivational interviewing re substance, although continued chronically elevated risk, acute risk sufficiently reduced to no longer require inpatient psychiatric hospitalization.

## 2023-04-11 NOTE — BH PSYCHOLOGY - GROUP THERAPY NOTE - NSPSYCHOLGRPCOGINT_PSY_A_CORE FT
Cognitive/behavioral therapy, Emotion regulation/coping skills taught, Psychoeducation, Acceptance and Commitment Therapy (ACT)

## 2023-04-11 NOTE — BH INPATIENT PSYCHIATRY DISCHARGE NOTE - OTHER PAST PSYCHIATRIC HISTORY (INCLUDE DETAILS REGARDING ONSET, COURSE OF ILLNESS, INPATIENT/OUTPATIENT TREATMENT)
Patient is a 32-year-old  male; undomiciled; hx of homelessness; PPHx schizophrenia, per PSYCKES of various dx (mood, psychotic, anxiety, adjustment, substance disorders), prior admissions, denies hx of suicidality or violence; no known PMHx; BIB EMS; psychiatry consulted for evaluation; transferred to [place] [address] for ongoing psychiatric care.  Per ED report, working diagnosis of schizophrenia spectrum illness per history including schizophrenia, schizoaffective disorder, substance-induced mood/psychotic disorder; R/O personality disorder.  Currently, patient reported some paranoia and thought confusion, denying all other psychotic symptoms. Of note, pt with multiple other MRNs in [place] and 3 ED visits in 24 hours. Assessment limited as pt is a limited historian and minimally engaged.  Pt with hx of multiple prior inpatient admissions but also hx of malingering for food/shelter. Currently denies any thoughts of self harm, passive or active SI, or HI. Pt. reported daily use of ecstasy, 1-2 pills for several weeks.  Currently her reported AH calling his name and making derogatory comments.     Inpatient admissions at [facility] (2022), [facility] (for 204 days in [id number]), [facility] (2021, 2020), [facility] (2021, 2020)  hx of residing at [facility]

## 2023-04-11 NOTE — BH INPATIENT PSYCHIATRY DISCHARGE NOTE - REASON FOR ADMISSION
Patient is a 32-year-old male; unclear current social hx, hx of homelessness; PPHx schizophrenia, per PSYCKES of various dx (mood, psychotic, anxiety, adjustment, substance disorders), prior admissions, denies hx of suicidality or violence; no known PMHx; BIB EMS; psychiatry consulted for evaluation; transferred to [place] [address] for ongoing psychiatric care, presented disorganized, internally preoccupied, AH

## 2023-04-11 NOTE — BH INPATIENT PSYCHIATRY DISCHARGE NOTE - HPI (INCLUDE ILLNESS QUALITY, SEVERITY, DURATION, TIMING, CONTEXT, MODIFYING FACTORS, ASSOCIATED SIGNS AND SYMPTOMS)
Patient is a 32-year-old male; unclear current social hx, hx of homelessness; PPHx schizophrenia, per PSYCKES of various dx (mood, psychotic, anxiety, adjustment, substance disorders), prior admissions, denies hx of suicidality or violence; no known PMHx; BIB EMS; psychiatry consulted for evaluation; transferred to [facility] for ongoing psychiatric care.    Per ED  Assessment:  "Pt initially states he came in because he wasn't feeling good and when asked to elaborate he states "I don't know, hearing voices."  He is unable to describe the voices, denies SI/HI.  He states he has a psychiatrist but then is unable to provide a name and denies any prior dx.  He denies being on medications recently.  When asked where he has been staying, he states "I don't know."  Pt reports tobacco use but denies other substance use.  When asked how we can help him, he replies "medications or something.""    On initial assessment in 4 patient states he came to the hospital because he's been feeling confused for a while. Patient is a limited historian, states "I don't know" to multiple questions. Reports feeling depressed, though eating and sleeping well. Feels that his thoughts are unclear and reports some paranoia, denies AHV. Denies any current or history of self harm, passive or active SI, or HI. Patient denies any recent nicotine/tobacco, alcohol, or other substance use. Denies any PMH. Reports taking haldol in the past, feels like it didn't help. Has taken Risperdal with some positive effect, amenable to taking it here.

## 2023-04-11 NOTE — BH INPATIENT PSYCHIATRY DISCHARGE NOTE - NSDCMRMEDTOKEN_GEN_ALL_CORE_FT
divalproex sodium 500 mg oral tablet, extended release: 4 tab(s) orally once a day (at bedtime) MDD: 2000mg  risperiDONE 4 mg oral tablet: 1 tab(s) orally once a day (at bedtime) MDD: 4mg   divalproex sodium 500 mg oral tablet, extended release: 4 tab(s) orally once a day (at bedtime) MDD: 2000mg  RisperDAL 1 mg oral tablet: 1 tab(s) orally once a day (in the morning)  risperiDONE 4 mg oral tablet: 1 tab(s) orally once a day (at bedtime)

## 2023-04-11 NOTE — BH PSYCHOLOGY - GROUP THERAPY NOTE - TOKEN PULL-DIAGNOSIS
Primary Diagnosis:  Schizophrenia spectrum and other psychotic disorders [F29]        Problem Dx:   Substance abuse [F19.10]

## 2023-04-11 NOTE — BH INPATIENT PSYCHIATRY DISCHARGE NOTE - NSDCCPCAREPLAN_GEN_ALL_CORE_FT
PRINCIPAL DISCHARGE DIAGNOSIS  Diagnosis: Schizophrenia  Assessment and Plan of Treatment:       SECONDARY DISCHARGE DIAGNOSES  Diagnosis: Noncompliance with medications  Assessment and Plan of Treatment:      PRINCIPAL DISCHARGE DIAGNOSIS  Diagnosis: Schizophrenia  Assessment and Plan of Treatment:       SECONDARY DISCHARGE DIAGNOSES  Diagnosis: Noncompliance with medications  Assessment and Plan of Treatment:     Diagnosis: Substance abuse, daily use  Assessment and Plan of Treatment:

## 2023-04-11 NOTE — BH INPATIENT PSYCHIATRY PROGRESS NOTE - NSBHCHARTREVIEWVS_PSY_A_CORE FT
Vital Signs Last 24 Hrs  T(C): 36.5 (04-11-23 @ 06:29), Max: 36.6 (04-10-23 @ 22:46)  T(F): 97.7 (04-11-23 @ 06:29), Max: 97.9 (04-10-23 @ 22:46)  HR: --  BP: --  BP(mean): --  RR: --  SpO2: --    Orthostatic VS  04-10-23 @ 19:27  Lying BP: --/-- HR: --  Sitting BP: 111/66 HR: 90  Standing BP: 116/59 HR: 97  Site: --  Mode: --  Orthostatic VS  04-10-23 @ 07:58  Lying BP: --/-- HR: --  Sitting BP: 98/63 HR: 60  Standing BP: 95/60 HR: 81  Site: upper left arm  Mode: electronic  Orthostatic VS  04-09-23 @ 19:00  Lying BP: --/-- HR: --  Sitting BP: 122/61 HR: 90  Standing BP: 133/81 HR: 85  Site: --  Mode: electronic  Orthostatic VS  04-09-23 @ 08:08  Lying BP: 119/77 HR: 61  Sitting BP: --/-- HR: --  Standing BP: --/-- HR: --  Site: --  Mode: electronic   Vital Signs Last 24 Hrs  T(C): 36.5 (04-11-23 @ 06:29), Max: 36.6 (04-10-23 @ 22:46)  T(F): 97.7 (04-11-23 @ 06:29), Max: 97.9 (04-10-23 @ 22:46)  HR: --  BP: --  BP(mean): --  RR: --  SpO2: --    Orthostatic VS  04-10-23 @ 19:27  Lying BP: --/-- HR: --  Sitting BP: 111/66 HR: 90  Standing BP: 116/59 HR: 97  Site: --  Mode: --  Orthostatic VS  04-10-23 @ 07:58  Lying BP: --/-- HR: --  Sitting BP: 98/63 HR: 60  Standing BP: 95/60 HR: 81  Site: upper left arm  Mode: electronic  Orthostatic VS  04-09-23 @ 19:00  Lying BP: --/-- HR: --  Sitting BP: 122/61 HR: 90  Standing BP: 133/81 HR: 85  Site: --  Mode: electronic   Vital Signs Last 24 Hrs  T(C): 36.2 (04-11-23 @ 14:44), Max: 36.6 (04-10-23 @ 22:46)  T(F): 97.1 (04-11-23 @ 14:44), Max: 97.9 (04-10-23 @ 22:46)  HR: --  BP: --  BP(mean): --  RR: --  SpO2: --    Orthostatic VS  04-10-23 @ 19:27  Lying BP: --/-- HR: --  Sitting BP: 111/66 HR: 90  Standing BP: 116/59 HR: 97  Site: --  Mode: --  Orthostatic VS  04-10-23 @ 07:58  Lying BP: --/-- HR: --  Sitting BP: 98/63 HR: 60  Standing BP: 95/60 HR: 81  Site: upper left arm  Mode: electronic  Orthostatic VS  04-09-23 @ 19:00  Lying BP: --/-- HR: --  Sitting BP: 122/61 HR: 90  Standing BP: 133/81 HR: 85  Site: --  Mode: electronic   Vital Signs Last 24 Hrs  T(C): 36.6 (04-21-23 @ 06:01), Max: 36.6 (04-21-23 @ 06:01)  T(F): 97.8 (04-21-23 @ 06:01), Max: 97.8 (04-21-23 @ 06:01)  HR: --  BP: --  BP(mean): --  RR: --  SpO2: --    Orthostatic VS  04-21-23 @ 08:10  Lying BP: --/-- HR: --  Sitting BP: 117/94 HR: 80  Standing BP: 114/65 HR: 97  Site: --  Mode: --  Orthostatic VS  04-20-23 @ 19:28  Lying BP: --/-- HR: --  Sitting BP: 130/75 HR: 77  Standing BP: 123/78 HR: 85  Site: --  Mode: --  Orthostatic VS  04-20-23 @ 08:25  Lying BP: --/-- HR: --  Sitting BP: 104/66 HR: 66  Standing BP: 112/70 HR: 71  Site: --  Mode: --

## 2023-04-11 NOTE — BH INPATIENT PSYCHIATRY PROGRESS NOTE - NSBHFUPINTERVALHXFT_PSY_A_CORE
Patient seen and interviewed in dayroom. Patient received Benadryl 50mg, Haldol 5mg, Atarax 50mg, and melatonin 3mg PRNs at 21:20 last night for agitation/insomnia. No other overnight concerns. Patient discussed in treatment team, discussed treatment goals, clinical progress, and medication changes.    Patient slept and ate well. He reports feeling "fine" today and that he is ready to leave. He appears more calm this morning during interview relative to prior encounters however he continues to be observed pacing throughout the unit. He continues to respond to internal stimuli, laughing, smiling to himself, and speaking to himself. Per RN he occasionally speaks to apparent auditory hallucinations saying "stop putting these thoughts on me". When asked he continues to deny AH/VH.     He is tolerating recent increase to Depakote 1000mg wd and Risperidone 1mg in AM and 4mg qHS. Pt remains compliant with medications and denies SE. Patient encouraged to continue taking medication, engaging in groups, and taking care of ADLs to best of ability. He denies SI/HI. VSS. Patient seen and interviewed in dayroom. Patient received Benadryl 50mg, Haldol 5mg, Atarax 50mg, and melatonin 3mg PRNs at 21:20 last night for agitation/insomnia. No other overnight concerns. Patient discussed in treatment team, discussed treatment goals, clinical progress, and medication changes.    Patient slept and ate well. He reports feeling "fine" today and that he is ready to leave. He appears more calm this morning during interview relative to prior encounters however he continues to be observed pacing throughout the unit. He continues to respond to internal stimuli, laughing, smiling to himself, and speaking to himself. Per RN he occasionally speaks to apparent auditory hallucinations saying "stop putting these thoughts on me". When asked he continues to deny AH/VH.     He is tolerating recent increase to Depakote 1000mg BID and Risperidone 1mg in AM and 4mg qHS. Pt remains compliant with medications and denies SE. Patient encouraged to continue taking medication, engaging in groups, and taking care of ADLs to best of ability. He denies SI/HI. VSS.

## 2023-04-11 NOTE — BH PSYCHOLOGY - GROUP THERAPY NOTE - NSPSYCHOLGRPCOGPT_PSY_A_CORE FT
Patient attended Cognitive Behavioral Therapy Group utilizing concepts and skills from Acceptance and Commitment Therapy (ACT). The group started with a brief check in and mindfulness /relaxation exercise. The group then focused on the topic of distress tolerance and self care. Patients discussed acceptance of emotional experience and the concept of distress tolerance. Patients shared examples of healthy ways to cope with distress and ways to engage in healthy self-care (balanced sleep, healthy eating etc). The  explained concepts, reinforced participation, and engaged patients in the discussion.

## 2023-04-11 NOTE — BH INPATIENT PSYCHIATRY PROGRESS NOTE - NSBHMETABOLIC_PSY_ALL_CORE_FT
BMI: BMI (kg/m2): 30.4 (03-31-23 @ 18:58)  HbA1c: A1C with Estimated Average Glucose Result: 5.1 % (04-10-23 @ 08:20)    Glucose:   BP: --  Lipid Panel: Date/Time: 04-10-23 @ 08:20  Cholesterol, Serum: 160  Direct LDL: --  HDL Cholesterol, Serum: 52  Total Cholesterol/HDL Ration Measurement: --  Triglycerides, Serum: 105

## 2023-04-12 RX ADMIN — DIVALPROEX SODIUM 1000 MILLIGRAM(S): 500 TABLET, DELAYED RELEASE ORAL at 08:12

## 2023-04-12 RX ADMIN — Medication 3 MILLIGRAM(S): at 20:39

## 2023-04-12 RX ADMIN — RISPERIDONE 4 MILLIGRAM(S): 4 TABLET ORAL at 20:38

## 2023-04-12 RX ADMIN — RISPERIDONE 1 MILLIGRAM(S): 4 TABLET ORAL at 08:13

## 2023-04-12 RX ADMIN — Medication 50 MILLIGRAM(S): at 20:38

## 2023-04-12 RX ADMIN — DIVALPROEX SODIUM 1000 MILLIGRAM(S): 500 TABLET, DELAYED RELEASE ORAL at 20:38

## 2023-04-12 NOTE — BH INPATIENT PSYCHIATRY PROGRESS NOTE - NSBHASSESSSUMMFT_PSY_ALL_CORE
Patient is a 32-year-old male; unclear current social hx, hx of homelessness; PPHx schizophrenia, per PSYCKES of various dx (mood, psychotic, anxiety, adjustment, substance disorders), prior admissions, denies hx of suicidality or violence; no known PMHx; BIB EMS; psychiatry consulted for evaluation; transferred to [place] [address] for ongoing psychiatric care.     Working diagnosis of schizophrenia spectrum illness per history including schizophrenia, schizoaffective disorder, substance-induced mood/psychotic disorder; R/O personality disorder.    Currently, patient reports some paranoia and thought confusion, denying all other psychotic symptoms. Of note, pt with multiple other MRNs in [place] and 3 ED visits in 24 hours. Assessment limited as pt is a limited historian and minimally engaged.  Pt with hx of multiple prior inpatient admissions but also hx of malingering for food/shelter. Currently denies any thoughts of self harm, passive or active SI, or HI. No known recent substance use, utox negative and blood alcohol negative. Start risperidone, plan to titrate.    Continued inpatient admission required for safety, stabilization, medication optimization, safe discharge planning.      Plan  1. Legal: Admitted on 9.27 status  2. Safety: No reported SI/SIB/HI/VI currently on unit; continue routine observation.   -psychotropic PRN medications for safety  3. Psychiatric:   -risperidone 1mg daily and  4 mg qHS for psychosis; plan to titrate as tolerated; R/B/A and side effects discussed  -Initiate Depakote ER 500mg qhs  -PRN melatonin 3 mg qHS for insomnia  4. Therapy: group & milieu therapy as appropriate  5. Medical: No acute medical needs identified  -CBC & CMP WNL, TSH 0.63, utox negative, blood alcohol negative  6. Collateral: ongoing  7. Disposition: When stable

## 2023-04-12 NOTE — BH INPATIENT PSYCHIATRY PROGRESS NOTE - NSBHCHARTREVIEWVS_PSY_A_CORE FT
Vital Signs Last 24 Hrs  T(C): 35.8 (04-12-23 @ 05:47), Max: 37.2 (04-11-23 @ 18:37)  T(F): 96.5 (04-12-23 @ 05:47), Max: 98.9 (04-11-23 @ 18:37)  HR: --  BP: --  BP(mean): --  RR: --  SpO2: --    Orthostatic VS  04-11-23 @ 18:37  Lying BP: --/-- HR: --  Sitting BP: 107/64 HR: 75  Standing BP: 106/64 HR: 81  Site: --  Mode: --  Orthostatic VS  04-10-23 @ 19:27  Lying BP: --/-- HR: --  Sitting BP: 111/66 HR: 90  Standing BP: 116/59 HR: 97  Site: --  Mode: --   Vital Signs Last 24 Hrs  T(C): 35.8 (04-12-23 @ 05:47), Max: 37.2 (04-11-23 @ 18:37)  T(F): 96.5 (04-12-23 @ 05:47), Max: 98.9 (04-11-23 @ 18:37)  HR: --  BP: --  BP(mean): --  RR: --  SpO2: --    Orthostatic VS  04-12-23 @ 08:19  Lying BP: 118/75 HR: 70  Sitting BP: --/-- HR: --  Standing BP: --/-- HR: --  Site: --  Mode: electronic  Orthostatic VS  04-11-23 @ 18:37  Lying BP: --/-- HR: --  Sitting BP: 107/64 HR: 75  Standing BP: 106/64 HR: 81  Site: --  Mode: --  Orthostatic VS  04-10-23 @ 19:27  Lying BP: --/-- HR: --  Sitting BP: 111/66 HR: 90  Standing BP: 116/59 HR: 97  Site: --  Mode: --   Vital Signs Last 24 Hrs  T(C): 36.6 (04-21-23 @ 06:01), Max: 36.6 (04-21-23 @ 06:01)  T(F): 97.8 (04-21-23 @ 06:01), Max: 97.8 (04-21-23 @ 06:01)  HR: --  BP: --  BP(mean): --  RR: --  SpO2: --    Orthostatic VS  04-21-23 @ 08:10  Lying BP: --/-- HR: --  Sitting BP: 117/94 HR: 80  Standing BP: 114/65 HR: 97  Site: --  Mode: --  Orthostatic VS  04-20-23 @ 19:28  Lying BP: --/-- HR: --  Sitting BP: 130/75 HR: 77  Standing BP: 123/78 HR: 85  Site: --  Mode: --  Orthostatic VS  04-20-23 @ 08:25  Lying BP: --/-- HR: --  Sitting BP: 104/66 HR: 66  Standing BP: 112/70 HR: 71  Site: --  Mode: --

## 2023-04-12 NOTE — BH INPATIENT PSYCHIATRY PROGRESS NOTE - NSBHFUPINTERVALHXFT_PSY_A_CORE
Patient seen and interviewed in dayroom.  No  overnight concerns. Patient discussed in treatment team, discussed treatment goals, clinical progress, and medication changes. Per SW patient will need shelter application 48 hours prior to dc.  Discussed dispo issues for next week.   Patient slept and ate well. He reports feeling "fine" today and that he is ready to leave. He is observed pacing throughout the unit, talking to himself, although frequency has decreased a bit. He continues to respond to internal stimuli, laughing, smiling to himself, and speaking to himself. Per RN he occasionally speaks to apparent auditory hallucinations saying "stop putting these thoughts on me". When patient is asked he continues to deny AH/VH.     He is tolerating recent increase to Depakote 1000mg BID, he is scheduled for VPA level later today for trough level, Risperidone 1mg in AM and 4mg qHS. Attempted to discuss MEDINA with patient however, he interrupted writer stating "I had that twice before I don't want it again" then stated "you guys do the same thing" pt more irritable as interview progressed.  Pt remains compliant with medications and denies SE. Patient encouraged to continue taking medication, engaging in groups, and taking care of ADLs to best of ability. He denies SI/HI. VSS.

## 2023-04-13 RX ADMIN — RISPERIDONE 4 MILLIGRAM(S): 4 TABLET ORAL at 21:55

## 2023-04-13 RX ADMIN — RISPERIDONE 1 MILLIGRAM(S): 4 TABLET ORAL at 08:34

## 2023-04-13 RX ADMIN — HALOPERIDOL DECANOATE 5 MILLIGRAM(S): 100 INJECTION INTRAMUSCULAR at 21:55

## 2023-04-13 RX ADMIN — DIVALPROEX SODIUM 1000 MILLIGRAM(S): 500 TABLET, DELAYED RELEASE ORAL at 08:34

## 2023-04-13 RX ADMIN — DIVALPROEX SODIUM 1000 MILLIGRAM(S): 500 TABLET, DELAYED RELEASE ORAL at 21:55

## 2023-04-13 RX ADMIN — Medication 3 MILLIGRAM(S): at 21:55

## 2023-04-13 NOTE — BH INPATIENT PSYCHIATRY PROGRESS NOTE - NSBHFUPINTERVALHXFT_PSY_A_CORE
Patient seen and interviewed in room. Per overnight team, patient refused bloodwork last night (for depakote level) and was irritable. No other overnight concerns - patient sleeping and eating well. Patient discussed in treatment team, discussed treatment goals, clinical progress, and medication changes. Per  patient will need shelter application 48 hours prior to dc.  Discussed dispo issues for next week.     He reports feeling "okay" today. He was resting in his room and has been observed pacing throughout the unit though less frequently. Occasionally seen responding to internal stimuli, laughing, smiling, and speaking to himself. When patient is asked he continues to deny AH/VH.     He is tolerating recent increase to Depakote 1000mg BID and Risperidone 1mg in AM and 4mg qH. He is rescheduled for VPA level tomorrow evening for trough level which he is now amenable to. Yesterday, patient refused MEDINA and would not like to discuss further. Pt remains compliant with medications and denies SE. Patient encouraged to continue taking medication, engaging in groups, and taking care of ADLs to best of ability. He denies SI/HI. VSS. Patient seen and interviewed in room. Per overnight team, patient refused bloodwork last night (for depakote level) and was irritable. No other overnight concerns - patient sleeping and eating well. Patient discussed in treatment team, discussed treatment goals, clinical progress, and medication changes. Per  patient will need shelter application 48 hours prior to dc.  Discussed dispo issues for next week.   He reports feeling "okay" today. He was resting in his room and has been observed pacing throughout the unit though less frequently. Occasionally seen responding to internal stimuli, laughing, smiling, and speaking to himself. When patient is asked he continues to deny AH/VH.     He is tolerating recent increase to Depakote 1000mg BID and Risperidone 1mg in AM and 4mg qH. He is rescheduled for VPA level tomorrow evening for trough level which he is now amenable to. Yesterday, patient refused MEDINA and would not like to discuss further. Pt remains compliant with medications and denies SE. Patient encouraged to continue taking medication, engaging in groups, and taking care of ADLs to best of ability. He denies SI/HI. VSS.

## 2023-04-13 NOTE — BH INPATIENT PSYCHIATRY PROGRESS NOTE - NSBHATTESTTYPEVISIT_PSY_A_CORE
On-site Attending with Resident/Fellow/Student and JOHAN (99XXX codes) On-site Attending supervising JOHAN (99XXX codes)

## 2023-04-13 NOTE — DIETITIAN INITIAL EVALUATION ADULT - PERTINENT MEDS FT
MEDICATIONS  (STANDING):  divalproex ER 1000 milliGRAM(s) Oral <User Schedule>  risperiDONE   Tablet 1 milliGRAM(s) Oral daily  risperiDONE   Tablet 4 milliGRAM(s) Oral at bedtime    MEDICATIONS  (PRN):  diphenhydrAMINE 50 milliGRAM(s) Oral every 4 hours PRN EPS  diphenhydrAMINE Injectable 50 milliGRAM(s) IntraMuscular once PRN EPS  haloperidol     Tablet 5 milliGRAM(s) Oral every 4 hours PRN agitation from psychosis  haloperidol    Injectable 5 milliGRAM(s) IntraMuscular once PRN combative behavior from psychosis  hydrOXYzine hydrochloride 50 milliGRAM(s) Oral every 6 hours PRN anxiety  LORazepam   Injectable 2 milliGRAM(s) IntraMuscular once PRN Severe aggression  melatonin. 3 milliGRAM(s) Oral at bedtime PRN Insomnia  nicotine  Polacrilex Gum 2 milliGRAM(s) Oral every 2 hours PRN nicotine

## 2023-04-13 NOTE — DIETITIAN INITIAL EVALUATION ADULT - OTHER INFO
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   Spoke to patient in his room who was laying in his bed with covers over him. Patient endorses good appetite. Denies any weight changes or n/v/d/c. Food preferences obtained.  Patient to be d/c to a shelter.

## 2023-04-13 NOTE — BH INPATIENT PSYCHIATRY PROGRESS NOTE - CURRENT MEDICATION
MEDICATIONS  (STANDING):  divalproex ER 1000 milliGRAM(s) Oral <User Schedule>  risperiDONE   Tablet 4 milliGRAM(s) Oral at bedtime  risperiDONE   Tablet 1 milliGRAM(s) Oral daily    MEDICATIONS  (PRN):  diphenhydrAMINE 50 milliGRAM(s) Oral every 4 hours PRN EPS  diphenhydrAMINE Injectable 50 milliGRAM(s) IntraMuscular once PRN EPS  haloperidol     Tablet 5 milliGRAM(s) Oral every 4 hours PRN agitation from psychosis  haloperidol    Injectable 5 milliGRAM(s) IntraMuscular once PRN combative behavior from psychosis  hydrOXYzine hydrochloride 50 milliGRAM(s) Oral every 6 hours PRN anxiety  LORazepam   Injectable 2 milliGRAM(s) IntraMuscular once PRN Severe aggression  melatonin. 3 milliGRAM(s) Oral at bedtime PRN Insomnia  nicotine  Polacrilex Gum 2 milliGRAM(s) Oral every 2 hours PRN nicotine   MEDICATIONS  (STANDING):  divalproex ER 1000 milliGRAM(s) Oral <User Schedule>  risperiDONE   Tablet 1 milliGRAM(s) Oral daily  risperiDONE   Tablet 4 milliGRAM(s) Oral at bedtime    MEDICATIONS  (PRN):  diphenhydrAMINE 50 milliGRAM(s) Oral every 4 hours PRN EPS  diphenhydrAMINE Injectable 50 milliGRAM(s) IntraMuscular once PRN EPS  haloperidol     Tablet 5 milliGRAM(s) Oral every 4 hours PRN agitation from psychosis  haloperidol    Injectable 5 milliGRAM(s) IntraMuscular once PRN combative behavior from psychosis  hydrOXYzine hydrochloride 50 milliGRAM(s) Oral every 6 hours PRN anxiety  LORazepam   Injectable 2 milliGRAM(s) IntraMuscular once PRN Severe aggression  melatonin. 3 milliGRAM(s) Oral at bedtime PRN Insomnia  nicotine  Polacrilex Gum 2 milliGRAM(s) Oral every 2 hours PRN nicotine   MEDICATIONS  (STANDING):  divalproex ER 2000 milliGRAM(s) Oral at bedtime  risperiDONE   Tablet 1 milliGRAM(s) Oral daily  risperiDONE   Tablet 4 milliGRAM(s) Oral at bedtime    MEDICATIONS  (PRN):  diphenhydrAMINE 50 milliGRAM(s) Oral every 4 hours PRN EPS  diphenhydrAMINE Injectable 50 milliGRAM(s) IntraMuscular once PRN EPS  haloperidol     Tablet 5 milliGRAM(s) Oral every 4 hours PRN agitation from psychosis  haloperidol    Injectable 5 milliGRAM(s) IntraMuscular once PRN combative behavior from psychosis  hydrOXYzine hydrochloride 50 milliGRAM(s) Oral every 6 hours PRN anxiety  LORazepam   Injectable 2 milliGRAM(s) IntraMuscular once PRN aggression  melatonin. 3 milliGRAM(s) Oral at bedtime PRN Insomnia  nicotine  Polacrilex Gum 2 milliGRAM(s) Oral every 2 hours PRN nicotine

## 2023-04-13 NOTE — BH INPATIENT PSYCHIATRY PROGRESS NOTE - NSBHASSESSSUMMFT_PSY_ALL_CORE
Patient is a 32-year-old male; unclear current social hx, hx of homelessness; PPHx schizophrenia, per PSYCKES of various dx (mood, psychotic, anxiety, adjustment, substance disorders), prior admissions, denies hx of suicidality or violence; no known PMHx; BIB EMS; psychiatry consulted for evaluation; transferred to [place] [address] for ongoing psychiatric care.     Working diagnosis of schizophrenia spectrum illness per history including schizophrenia, schizoaffective disorder, substance-induced mood/psychotic disorder; R/O personality disorder.    Currently, patient reports some paranoia and thought confusion, denying all other psychotic symptoms. Of note, pt with multiple other MRNs in [place] and 3 ED visits in 24 hours. Assessment limited as pt is a limited historian and minimally engaged.  Pt with hx of multiple prior inpatient admissions but also hx of malingering for food/shelter. Currently denies any thoughts of self harm, passive or active SI, or HI. No known recent substance use, utox negative and blood alcohol negative. Start risperidone, plan to titrate.    Continued inpatient admission required for safety, stabilization, medication optimization, safe discharge planning.      Plan  1. Legal: Admitted on 9.27 status  2. Safety: No reported SI/SIB/HI/VI currently on unit; continue routine observation.   -psychotropic PRN medications for safety  3. Psychiatric:   -risperidone 1mg daily and  4 mg qHS for psychosis; plan to titrate as tolerated; R/B/A and side effects discussed  -Initiate Depakote ER 500mg qhs  -PRN melatonin 3 mg qHS for insomnia  4. Therapy: group & milieu therapy as appropriate  5. Medical: No acute medical needs identified  -CBC & CMP WNL, TSH 0.63, utox negative, blood alcohol negative  6. Collateral: ongoing  7. Disposition: When stable

## 2023-04-13 NOTE — BH INPATIENT PSYCHIATRY PROGRESS NOTE - NSBHCHARTREVIEWVS_PSY_A_CORE FT
Vital Signs Last 24 Hrs  T(C): 36.7 (04-13-23 @ 06:11), Max: 37 (04-12-23 @ 20:01)  T(F): 98.1 (04-13-23 @ 06:11), Max: 98.6 (04-12-23 @ 20:01)  HR: --  BP: --  BP(mean): --  RR: 17 (04-12-23 @ 20:29) (17 - 17)  SpO2: --    Orthostatic VS  04-13-23 @ 08:07  Lying BP: --/-- HR: --  Sitting BP: 121/68 HR: 68  Standing BP: 117/73 HR: 72  Site: --  Mode: --  Orthostatic VS  04-12-23 @ 20:01  Lying BP: --/-- HR: --  Sitting BP: 104/67 HR: 81  Standing BP: 101/60 HR: 82  Site: --  Mode: --  Orthostatic VS  04-12-23 @ 08:19  Lying BP: 118/75 HR: 70  Sitting BP: --/-- HR: --  Standing BP: --/-- HR: --  Site: --  Mode: electronic  Orthostatic VS  04-11-23 @ 18:37  Lying BP: --/-- HR: --  Sitting BP: 107/64 HR: 75  Standing BP: 106/64 HR: 81  Site: --  Mode: --   Vital Signs Last 24 Hrs  T(C): 36 (04-13-23 @ 14:28), Max: 37 (04-12-23 @ 20:01)  T(F): 96.8 (04-13-23 @ 14:28), Max: 98.6 (04-12-23 @ 20:01)  HR: --  BP: --  BP(mean): --  RR: 17 (04-12-23 @ 20:29) (17 - 17)  SpO2: --    Orthostatic VS  04-13-23 @ 08:07  Lying BP: --/-- HR: --  Sitting BP: 121/68 HR: 68  Standing BP: 117/73 HR: 72  Site: --  Mode: --  Orthostatic VS  04-12-23 @ 20:01  Lying BP: --/-- HR: --  Sitting BP: 104/67 HR: 81  Standing BP: 101/60 HR: 82  Site: --  Mode: --  Orthostatic VS  04-12-23 @ 08:19  Lying BP: 118/75 HR: 70  Sitting BP: --/-- HR: --  Standing BP: --/-- HR: --  Site: --  Mode: electronic  Orthostatic VS  04-11-23 @ 18:37  Lying BP: --/-- HR: --  Sitting BP: 107/64 HR: 75  Standing BP: 106/64 HR: 81  Site: --  Mode: --   Vital Signs Last 24 Hrs  T(C): 36.9 (04-13-23 @ 22:31), Max: 37.1 (04-13-23 @ 19:18)  T(F): 98.4 (04-13-23 @ 22:31), Max: 98.8 (04-13-23 @ 19:18)  HR: --  BP: --  BP(mean): --  RR: --  SpO2: --    Orthostatic VS  04-13-23 @ 19:18  Lying BP: --/-- HR: --  Sitting BP: 108/69 HR: 86  Standing BP: 113/62 HR: 98  Site: --  Mode: --  Orthostatic VS  04-13-23 @ 08:07  Lying BP: --/-- HR: --  Sitting BP: 121/68 HR: 68  Standing BP: 117/73 HR: 72  Site: --  Mode: --  Orthostatic VS  04-12-23 @ 20:01  Lying BP: --/-- HR: --  Sitting BP: 104/67 HR: 81  Standing BP: 101/60 HR: 82  Site: --  Mode: --  Orthostatic VS  04-12-23 @ 08:19  Lying BP: 118/75 HR: 70  Sitting BP: --/-- HR: --  Standing BP: --/-- HR: --  Site: --  Mode: electronic   Vital Signs Last 24 Hrs  T(C): 36.6 (04-21-23 @ 06:01), Max: 36.6 (04-21-23 @ 06:01)  T(F): 97.8 (04-21-23 @ 06:01), Max: 97.8 (04-21-23 @ 06:01)  HR: --  BP: --  BP(mean): --  RR: --  SpO2: --    Orthostatic VS  04-21-23 @ 08:10  Lying BP: --/-- HR: --  Sitting BP: 117/94 HR: 80  Standing BP: 114/65 HR: 97  Site: --  Mode: --  Orthostatic VS  04-20-23 @ 19:28  Lying BP: --/-- HR: --  Sitting BP: 130/75 HR: 77  Standing BP: 123/78 HR: 85  Site: --  Mode: --  Orthostatic VS  04-20-23 @ 08:25  Lying BP: --/-- HR: --  Sitting BP: 104/66 HR: 66  Standing BP: 112/70 HR: 71  Site: --  Mode: --

## 2023-04-14 LAB
AMMONIA BLD-MCNC: 56 UMOL/L — HIGH (ref 11–55)
VALPROATE SERPL-MCNC: 86.2 UG/ML — SIGNIFICANT CHANGE UP (ref 50–100)

## 2023-04-14 RX ADMIN — RISPERIDONE 1 MILLIGRAM(S): 4 TABLET ORAL at 09:58

## 2023-04-14 RX ADMIN — RISPERIDONE 4 MILLIGRAM(S): 4 TABLET ORAL at 21:29

## 2023-04-14 RX ADMIN — DIVALPROEX SODIUM 1000 MILLIGRAM(S): 500 TABLET, DELAYED RELEASE ORAL at 21:29

## 2023-04-14 RX ADMIN — Medication 3 MILLIGRAM(S): at 21:29

## 2023-04-14 RX ADMIN — DIVALPROEX SODIUM 1000 MILLIGRAM(S): 500 TABLET, DELAYED RELEASE ORAL at 09:58

## 2023-04-14 NOTE — BH TREATMENT PLAN - NSTXPSYCHOINTERMD_PSY_ALL_CORE
risperidone trial, group therapy  Risperdal 2mg qhs
risperidone trial, group therapy  Risperdal 2mg qhs
risperidone trial, group therapy  Risperdal 4mg qhs, 1mg qd  symptom reduction  Consider MEDINA

## 2023-04-14 NOTE — BH INPATIENT PSYCHIATRY PROGRESS NOTE - NSBHFUPINTERVALHXFT_PSY_A_CORE
Patient seen and interviewed in room. Patient received Haldol 5mg and melatonin 3mg PRN last night for insomnia. No other overnight concerns. Patient discussed in treatment team, discussed treatment goals, clinical progress, and medication changes. Patient slept through the night and is eating well. He reports feeling tired this morning and describes his mood as "normal". He was calm and resting in his room at time of interview but still is occasionally seen pacing and responding to internal stimuli (laughing, smiling, speaking to self) though less frequently. Patient continues to deny AH/VH.     He is tolerating Depakote 1000mg BID and Risperidone 1mg AM and 4mg qH. He is rescheduled for VPA level this evening which he is amenable to (previously refused bloodwork). Patient refused MEDINA and would not like to discuss further. Patient remains compliant with medications and denies SE. Encouraged to continue taking medication, engaging in groups, and taking care of ADLs. He denies SI/HI. VSS.

## 2023-04-14 NOTE — BH TREATMENT PLAN - NSBHPRIMARYDX_PSY_ALL_CORE
Schizophrenia spectrum and other psychotic disorders    

## 2023-04-14 NOTE — BH TREATMENT PLAN - NSTXPLANTHERAPYSESSIONSFT_PSY_ALL_CORE
04-08-23  --  Type of session: Individual  Level of patient participation: Engaged  Duration of participation: Less than 15 minutes  Therapy conducted by: Psych rehab  Therapy Summary: Upon approach, patient is in male hallway. Patient is irritable and preccoupied with discharge. Patient appears internally preoccupied while writer is speaking to him. Patient was encouraged to maintain treatment compliance and maintain behavioral control, however after being told pt was not leaving this weekend, patient appeared minimally receptive. Patient denies HI and AH despite responding to internal stimuli on unit.     Patient has not attended any psychiatric rehabilitation groups during the last seven days despite daily prompting and encouragement from staff.

## 2023-04-14 NOTE — BH INPATIENT PSYCHIATRY PROGRESS NOTE - PRN MEDS
MEDICATIONS  (PRN):  diphenhydrAMINE 50 milliGRAM(s) Oral every 4 hours PRN EPS  diphenhydrAMINE Injectable 50 milliGRAM(s) IntraMuscular once PRN EPS  haloperidol     Tablet 5 milliGRAM(s) Oral every 4 hours PRN agitation from psychosis  haloperidol    Injectable 5 milliGRAM(s) IntraMuscular once PRN combative behavior from psychosis  hydrOXYzine hydrochloride 50 milliGRAM(s) Oral every 6 hours PRN anxiety  LORazepam   Injectable 2 milliGRAM(s) IntraMuscular once PRN Severe aggression  melatonin. 3 milliGRAM(s) Oral at bedtime PRN Insomnia  nicotine  Polacrilex Gum 2 milliGRAM(s) Oral every 2 hours PRN nicotine

## 2023-04-14 NOTE — BH TREATMENT PLAN - NSCMSPTSTRENGTHS_PSY_ALL_CORE
SW will continue to assess./Unable to obtain (specify)
Unable to obtain (specify)
Compliance to treatment/Future/goal oriented

## 2023-04-14 NOTE — BH TREATMENT PLAN - NSDCCRITERIA_PSY_ALL_CORE
When pt is no longer an acute or imminent risk of harm to self or others, and is able to care for self safely, pt may then be discharged.

## 2023-04-14 NOTE — BH TREATMENT PLAN - NSTXPSYCHOGOAL_PSY_ALL_CORE
Will identify 1 thought/feeling/behavior associated with hallucinations

## 2023-04-14 NOTE — BH INPATIENT PSYCHIATRY PROGRESS NOTE - NSDCCRITERIA_PSY_ALL_CORE
When pt is no longer an acute or imminent risk of harm to self or others, and is able to care for self safely, pt may then be discharged. CGI<=3  symptom stabilization  outpatient follow up  shelter

## 2023-04-14 NOTE — BH INPATIENT PSYCHIATRY PROGRESS NOTE - NSBHASSESSSUMMFT_PSY_ALL_CORE
Patient is a 32-year-old male; unclear current social hx, hx of homelessness; PPHx schizophrenia, per PSYCKES of various dx (mood, psychotic, anxiety, adjustment, substance disorders), prior admissions, denies hx of suicidality or violence; no known PMHx; BIB EMS; psychiatry consulted for evaluation; transferred to [place] [address] for ongoing psychiatric care.     Working diagnosis of schizophrenia spectrum illness per history including schizophrenia, schizoaffective disorder, substance-induced mood/psychotic disorder; R/O personality disorder.    Currently, patient reports some paranoia and thought confusion, denying all other psychotic symptoms. Of note, pt with multiple other MRNs in [place] and 3 ED visits in 24 hours. Assessment limited as pt is a limited historian and minimally engaged.  Pt with hx of multiple prior inpatient admissions but also hx of malingering for food/shelter. Currently denies any thoughts of self harm, passive or active SI, or HI. No known recent substance use, utox negative and blood alcohol negative. Start risperidone, plan to titrate.    Continued inpatient admission required for safety, stabilization, medication optimization, safe discharge planning.  Patient remains compliant with standing medications, has declined MEDINA.  Discussing dispo planning for next week, SW submitting shelter application.  Pt denies SI/HI, denies AVH.  Has been in fair behavioral control.  Although symptoms have attenuated, symptoms of internal preoccupation are still present.       Plan  1. Legal: Admitted on 9.27 status  2. Safety: No reported SI/SIB/HI/VI currently on unit; continue routine observation.   -psychotropic PRN medications for safety  3. Psychiatric:   -risperidone 1mg daily and  4 mg qHS for psychosis; plan to titrate as tolerated; R/B/A and side effects discussed  -Increase Depakote ER 1000mg BID  -PRN melatonin 3 mg qHS for insomnia  4. Therapy: group & milieu therapy as appropriate  5. Medical: No acute medical needs identified  -CBC & CMP WNL, TSH 0.63, utox negative, blood alcohol negative  6. Collateral: ongoing  7. Disposition: ongoing, working on dc for next week 4/20 Patient is a 32-year-old male; unclear current social hx, hx of homelessness; PPHx schizophrenia, per PSYCKES of various dx (mood, psychotic, anxiety, adjustment, substance disorders), prior admissions, denies hx of suicidality or violence; no known PMHx; BIB EMS; psychiatry consulted for evaluation; transferred to [place] [address] for ongoing psychiatric care.     Working diagnosis of schizophrenia spectrum illness per history including schizophrenia, schizoaffective disorder, substance-induced mood/psychotic disorder; R/O personality disorder.    Currently, patient reports some paranoia and thought confusion, denying all other psychotic symptoms. Of note, pt with multiple other MRNs in [place] and 3 ED visits in 24 hours. Assessment limited as pt is a limited historian and minimally engaged.  Pt with hx of multiple prior inpatient admissions but also hx of malingering for food/shelter. Currently denies any thoughts of self harm, passive or active SI, or HI. No known recent substance use, utox negative and blood alcohol negative. Start risperidone, plan to titrate.    Continued inpatient admission required for safety, stabilization, medication optimization, safe discharge planning.  Patient remains compliant with standing medications, has declined MEDINA.  Discussing dispo planning for next week, SW submitting shelter application.  Pt denies SI/HI, denies AVH.  Has been in fair behavioral control.  Although symptoms have attenuated, symptoms of internal preoccupation are still present.       Plan  1. Legal: Admitted on 9.27 status  2. Safety: No reported SI/SIB/HI/VI currently on unit; continue routine observation.   	- Psychotropic PRN medications for safety  3. Psychiatric:   	- Risperidone 1mg daily and 4 mg qHS for psychosis; plan to titrate as tolerated; R/B/A and side effects discussed  	- Depakote ER 1000mg BID, VPA level ordered for 3/14 (18:00)  	- PRN melatonin 3 mg qHS for insomnia  4. Therapy: group & milieu therapy as appropriate  5. Medical: No acute medical needs identified  	- CBC & CMP WNL, TSH 0.63, utox negative, blood alcohol negative  6. Collateral: ongoing  7. Disposition: ongoing, working on dc for next week 4/20

## 2023-04-14 NOTE — BH TREATMENT PLAN - NSTXPROBDCHOUS_PSY_ALL_CORE
[address] [employer] [facility],  [address] Cardiology  Naomi Mclean. Zane Redding M.D. Stephani Del Toro M.D.  Devante Brenner. Gaby Condon M.D. Jazmine Phillips M.D. Radha Paniagua M.D. MD Raza Mina   7/20/1929  Graciela Newman DO      This 45-year-old man is seen for outpatient cardiac follow-up today. He tires easily and uses a walker but is not having any chest pain or dyspnea. He is not having any ankle edema. His appetite is fair. He states that his symptoms and functional capacity have remained stable. He uses a walker primarily. He has not had any recurrent weight gain or ankle edema       history:  1. Presentation with atypical chest pain, 02/2004.  Abnormal MPS.  OP cardiac catheterization  severe LAD and CX stenoses.  The RCA no significant stenosis EF normal.  2. CABG, 03/2004  Dr. Chalino Dejesus.  LIMA-LAD, SVG-D1, SVG-OM1 and SVG-posterolateral branch of the CX. 3. TIA - transient visual loss, [address] [place] [id number]. 4. Surgical history back surgery and right hip replacement.  Right foot drop developed after the hip surgery.  Redo hip surgery  2004.    5. DVT circa 50 years ago. 6. COPD/reactive airway disease.  Cigarette abuse, 7.5 pack years.  Abstinent since 01/01/2004. 7. Essential hypertension. 8. Hyperlipidemia.    9. Lexiscan MPS, 04/09/2007.  Small  mostly fixed  defect  inferolateral wall at basal and mid levels suggesting previous MI. Hypokinesis.  EF 72%. 10. Echo, 04/10/2007.  Normal LV size, wall motion and function with EF >60% and Stage I diastolic dysfunction.  Moderate PEDRO.  Aortic sclerosis with mild stenosis and SUSAN 2.1 cm2.   Mild MR, TR and PHTN.    11. Family history : multiple members  heart disease beginning in  52s and 62s.    12. No drug allergies.    13. Lexiscan MPS, 07/06/2010. Wall motion normal, EF 67%,   Moderate size,  moderate severity, partially reversible lateral perfusion abnormality. 14. Chronic kidney disease.   BUN 20, Cr
1.6, GFR 44 - 12/12/2012. 15. Lexiscan MPS, 03/26/2013.  Inferolateral infarction.   Small distal anteroseptal and apical reversible defect.  EF= 72%.  TID present   Intermediate to high risk study. 16. Echo, 04/25/2013.   Normal LV size, thickness and systolic function, Stage II diastolic dysfunction, LAE, moderate AS, peak/mean gradients 25/12 mmHg, SUSAN 1.2 cm², RVSP normal.  17. Left parotid mass removed, Dr. Gisselle Garcia at [facility], 07/10/2014.    18. Echo, 03/26/2015.   Normal LV size, wall thickness and EF.  Stage Ia diastolic dysfunction.    Marked LAE.  Moderate aortic stenosis, mean gradient 18 mmHg, SUSAN ~1.3 cm².  VTI 0.39.  Moderate MAC, mild MR.   19. TTE 10/09/2017.  AV moderately sclerotic.   AV leaflet motion restricted.   AV peak gradient = 3.8 m/s.   SUSAN 0.7 cm2.  DI = 0.25.  Severe aortic stenosis. 20. Cardiac catheterization, 11/16/2017.   LMC no significant stenosis.  LAD mid 100% chronic total occlusion.  CX mid 80% stenosis, distal chronic total occlusion.  RCA dominant vessel.  Posterolateral branch mid 100% occlusion.  PDA ostial 80% stenosis.  1.7-2.0 mm vessel.  SVG-OM proximal diffuse 80% stenosis.  Good distal runoff.  OM supplies a very tiny vessel.   SVG Y-graft to PDA and diagonal widely patent.  LIMA-LAD widely patent.  99% stenosis in apical portion of LAD.  approximately 1 mm vessel at this level. 21. S/P TAVR on 12/13/2017.  22. Echo, 01/11/2018.  EF 60-65%.  Mild concentric LVH.  Normal RV   Severely dilated LA Severe MAC.  Mild MR.  s/p TAVR (Brandi 3 26 mm   AV mean gradient 13 mmHg.  Mild TR.  RVSP 32 mmHg. 23. Outpatient cardiac follow-up, 07/14/2020. Symptoms improved following 14 pound diuresis.  Ankle edema resolved. 24. Labs, 07/14/2020.  ProBNP 48679.  BUN 37.  Creatinine 1.6.  K 4.0.  25. Labs, 07/28/2020.  ProBNP =73816, BUN =23, creatinine =1.4.   26. Echo 08/26/2020:  LV not dilsted.  EF 30%.  Stage III diastolic dysfunction.  Left atrium severely dilated.
 RVSP= 61.  S/p TAVR asuncion.  SUSAN= 1.27 cm 2.  27. Outpatient cardiac assessment 08/21/2020: Feels better.  Leg edema resolved.  Toprol XL increased to 50 a.m. and 25 mg PM.  Labs drawn  28. OP cardiac assessment 09/08/2020: Fatigue.  Possibly intravascular volume depleted.  Lasix decreased to alternate day.  BMP drawn.  Episode of vasovagal syncope. 29. Labs 09/09/2020: BUN 41. Creatinine 1.7. GFR 38.  proBNP 9065 subsequently Lasix decreased to MWF       Review of Systems:  Constitutional: negative for fever and chills  Respiratory: negative for cough and hemoptysis  Cardiovascular:   Gastrointestinal: negative for abdominal pain, diarrhea, nausea and vomiting  Genitourinary:negative for dysuria and hematuria  Derm: negative for rash and skin lesion(s)  Neurological: negative for seizures and tremors  Endocrine: negative for diabetic symptoms including polydipsia and polyuria  Musculoskeletal: negative for CTD  Psychiatric: negative for psychosis and major depression     On examination, he is an alert pleasant elderly man in no distress skin is warm and dry. Respirations are unlabored. /62   Pulse 59   Resp 16   Ht 5' 10\" (1.778 m)   Wt 175 lb 11.2 oz (79.7 kg)   BMI 25.21 kg/m² . HEENT negative for scleral icterus. Extraocular muscles intact. No facial asymmetry or central cyanosis. Neck without masses or goiter. No bruit or JVD. Cardiac apex not displaced. Grade 2-3 ALBA upper sternal border rhythm regular. Abdomen normal.  Extremities without edema. Lungs are clear    EKG today reviewed by Dr. Marie Arcos. Normal sinus rhythm 59/min. Low QRS voltage. WY interval 272. Nonspecific T abnormality      Mr. Omega Hill is symptomatically and functionally stable. Medications are reviewed today and no changes made. He is at risk for electrolyte imbalance. A BMP. A BMP and proBNP will be checked today. Further recommendations will then be made. He will have cardiac follow-up in 6 months.
He is considering repair of a symptomatic hernia. Cardiac risk for this is elevated but not contraindicated      At completion of today's visit, medications include the following:    Current Outpatient Medications:     Polyvinyl Alcohol-Povidone (REFRESH OP), Apply to eye, Disp: , Rfl:     furosemide (LASIX) 40 MG tablet, Take 1 tablet by mouth every other day, Disp: 30 tablet, Rfl: 5    metoprolol succinate (TOPROL XL) 25 MG extended release tablet, TAKE 2 TABLETS BY MOUTH IN THE MORNING AND 1 TABLET IN THE AFTERNOON, Disp: 90 tablet, Rfl: 5    lisinopril (PRINIVIL;ZESTRIL) 5 MG tablet, Take 1 tablet by mouth daily, Disp: 30 tablet, Rfl: 5    potassium chloride (MICRO-K) 10 MEQ extended release capsule, Take 10 mEq by mouth daily, Disp: , Rfl:     busPIRone (BUSPAR) 5 MG tablet, TAKE ONE TABLET BY MOUTH EVERY 12 HOURS AS NEEDED FOR ANXIOUSNESS, Disp: , Rfl:     Budesonide-Formoterol Fumarate (SYMBICORT IN), Inhale into the lungs, Disp: , Rfl:     nitroGLYCERIN (NITROSTAT) 0.4 MG SL tablet, Place 1 tablet under the tongue every 5 minutes as needed for Chest pain, Disp: 25 tablet, Rfl: 5    tamsulosin (FLOMAX) 0.4 MG capsule, Take 0.4 mg by mouth every evening , Disp: , Rfl: 0    ipratropium-albuterol (DUONEB) 0.5-2.5 (3) MG/3ML SOLN nebulizer solution, , Disp: , Rfl:     atorvastatin (LIPITOR) 40 MG tablet, Take 40 mg by mouth every other day , Disp: , Rfl:     aspirin 81 MG EC tablet, Take 81 mg by mouth daily. , Disp: , Rfl:     ALBUTEROL IN, Inhale 2.5 mg into the lungs as needed. , Disp: , Rfl:     levothyroxine (SYNTHROID) 125 MCG tablet, Take 125 mcg by mouth daily. , Disp: , Rfl:     CycloSPORINE (RESTASIS OP), Apply 1 drop to eye 2 times daily, Disp: , Rfl:       Note: This report was completed utilizing computer voice recognition software. Every effort has been made to ensure accuracy, however; inadvertent computerized transcription errors may be present.     --Maximino Herrera MD on
5/11/2021 at 11:48 AM
DISCHARGE ISSUE - LACK OF APPROPRIATE HOUSING
DISCHARGE ISSUE - LACK OF APPROPRIATE HOUSING

## 2023-04-14 NOTE — BH TREATMENT PLAN - NSTXDISORGINTERPR_PSY_ALL_CORE
Pt has made minimal progress, as patient is unable to identify positive coping skills despite psychoeducation attempt from staff. Patient was encouraged to engage in exercise. Pt was minimally receptive.
Pts goal is to identify two to three coping skills for increased symptom management.

## 2023-04-14 NOTE — BH INPATIENT PSYCHIATRY PROGRESS NOTE - MSE UNSTRUCTURED FT
JOHAN/BEH: Adult male appears tired; dressed in hospital gown; calm; poor eye contact.   SPEECH: Slow rate, regular tone and volume. One to two word answers.  MOTOR: No PMR/PMA; no other abnormal movements.   MOOD: “Depressed”.   AFFECT: Dysthymic and mood congruent; restricted range; stable.   THOUGHT PROCESS: Linear, vague, and under-inclusive.   THOUGHT CONTENT: Denies passive or active SI or HI; no evidence of delusions, possible paranoia, focused on presenting symptoms.   PERCEPTION: Denies AVH. Does not appear to be responding to internal stimuli.   COGNITION: Grossly intact.   INSIGHT: Fair.   JUDGMENT: Fair.  IMPULSE CONTROL: Limited.   JOHAN/BEH: Adult male appears tired; dressed in hospital gown; calm; poor eye contact.   SPEECH: Slow rate, regular tone and volume. One to two word answers.  MOTOR: No PMR/PMA; no other abnormal movements.   MOOD: “Fine”.   AFFECT: Dysthymic and mood congruent; restricted range; stable.   THOUGHT PROCESS: Linear, vague, and under-inclusive.   THOUGHT CONTENT: Denies passive or active SI or HI; no evidence of delusions, possible paranoia, focused on presenting symptoms.   PERCEPTION: Denies AVH. Does not appear to be responding to internal stimuli.   COGNITION: Grossly intact.   INSIGHT: Fair.   JUDGMENT: Fair.  IMPULSE CONTROL: Limited.

## 2023-04-14 NOTE — BH TREATMENT PLAN - NSTXDCHOUSINTERSW_PSY_ALL_CORE
SW completed the initial  admission assessment.
Support and psychoed are being provided and discharge plans are to be arranged when appropriate. The patient will, likely, need a shelter.

## 2023-04-14 NOTE — PROGRESS NOTE BEHAVIORAL HEALTH - NSBHATTESTCOMMENTATTENDFT_PSY_A_CORE
oral The patient is a 32-year-old male; unclear current social hx, hx of being undomiciled; PPHx per PSYCKES of various dx (mood, psychotic, anxiety, adjustment, substance disorders), multiple inpatient hosptializations, unknown hx of suicidality or violence; unclear PMHx; BIB EMS for bizarre behavior; psychiatry consulted for evaluation.  Of note, pt with multiple other MRNs in [place] and this is 3rd ED visit in 24 hours.    Presenting with disorganized thoughts/behaviors in context of medication noncompliance, denying any recent substance use (no notable s/s of any alcohol/benzo/opioid withdrawal).  pt poor historian, limited collateral. will admit to psych 2pc status, haldol prn agiation, ativan prn anxiety. cont 1:1 while in ER.

## 2023-04-14 NOTE — BH TREATMENT PLAN - NSTXPATIENTPARTICIPATE_PSY_ALL_CORE
No, patient unwilling to participate
No, patient unwilling to participate
Patient participated in identification of needs/problems/goals for treatment

## 2023-04-15 RX ADMIN — RISPERIDONE 4 MILLIGRAM(S): 4 TABLET ORAL at 20:35

## 2023-04-15 RX ADMIN — DIVALPROEX SODIUM 1000 MILLIGRAM(S): 500 TABLET, DELAYED RELEASE ORAL at 20:35

## 2023-04-15 RX ADMIN — DIVALPROEX SODIUM 1000 MILLIGRAM(S): 500 TABLET, DELAYED RELEASE ORAL at 09:00

## 2023-04-15 RX ADMIN — Medication 3 MILLIGRAM(S): at 20:35

## 2023-04-15 RX ADMIN — RISPERIDONE 1 MILLIGRAM(S): 4 TABLET ORAL at 09:00

## 2023-04-16 RX ADMIN — RISPERIDONE 1 MILLIGRAM(S): 4 TABLET ORAL at 08:05

## 2023-04-16 RX ADMIN — DIVALPROEX SODIUM 1000 MILLIGRAM(S): 500 TABLET, DELAYED RELEASE ORAL at 20:15

## 2023-04-16 RX ADMIN — DIVALPROEX SODIUM 1000 MILLIGRAM(S): 500 TABLET, DELAYED RELEASE ORAL at 08:05

## 2023-04-16 RX ADMIN — RISPERIDONE 4 MILLIGRAM(S): 4 TABLET ORAL at 20:16

## 2023-04-17 RX ORDER — DIVALPROEX SODIUM 500 MG/1
1000 TABLET, DELAYED RELEASE ORAL AT BEDTIME
Refills: 0 | Status: COMPLETED | OUTPATIENT
Start: 2023-04-17 | End: 2023-04-17

## 2023-04-17 RX ORDER — DIVALPROEX SODIUM 500 MG/1
2000 TABLET, DELAYED RELEASE ORAL AT BEDTIME
Refills: 0 | Status: DISCONTINUED | OUTPATIENT
Start: 2023-04-18 | End: 2023-04-21

## 2023-04-17 RX ADMIN — RISPERIDONE 4 MILLIGRAM(S): 4 TABLET ORAL at 20:27

## 2023-04-17 RX ADMIN — Medication 3 MILLIGRAM(S): at 20:28

## 2023-04-17 RX ADMIN — DIVALPROEX SODIUM 1000 MILLIGRAM(S): 500 TABLET, DELAYED RELEASE ORAL at 20:27

## 2023-04-17 RX ADMIN — DIVALPROEX SODIUM 1000 MILLIGRAM(S): 500 TABLET, DELAYED RELEASE ORAL at 08:21

## 2023-04-17 RX ADMIN — RISPERIDONE 1 MILLIGRAM(S): 4 TABLET ORAL at 08:21

## 2023-04-17 NOTE — BH INPATIENT PSYCHIATRY PROGRESS NOTE - NSBHASSESSSUMMFT_PSY_ALL_CORE
Patient is a 32-year-old male; unclear current social hx, hx of homelessness; PPHx schizophrenia, per PSYCKES of various dx (mood, psychotic, anxiety, adjustment, substance disorders), prior admissions, denies hx of suicidality or violence; no known PMHx; BIB EMS; psychiatry consulted for evaluation; transferred to [place] [id number] for ongoing psychiatric care.     Working diagnosis of schizophrenia spectrum illness per history including schizophrenia, schizoaffective disorder, substance-induced mood/psychotic disorder; R/O personality disorder.    Currently, patient reports some paranoia and thought confusion, denying all other psychotic symptoms. Of note, pt with multiple other MRNs in [place] and 3 ED visits in 24 hours. Assessment limited as pt is a limited historian and minimally engaged.  Pt with hx of multiple prior inpatient admissions but also hx of malingering for food/shelter. Currently denies any thoughts of self harm, passive or active SI, or HI. No known recent substance use, utox negative and blood alcohol negative. Start risperidone, plan to titrate.    Continued inpatient admission required for safety, stabilization, medication optimization, safe discharge planning.  Patient remains compliant with standing medications, has declined MEDINA.  Discussing dispo planning for next week, SW submitting shelter application.  Pt denies SI/HI, denies AVH.  Has been in fair behavioral control.  Although symptoms have attenuated, symptoms of internal preoccupation are still present.       Plan  1. Legal: Admitted on 9.27 status  2. Safety: No reported SI/SIB/HI/VI currently on unit; continue routine observation.   	- Psychotropic PRN medications for safety  3. Psychiatric:   	- Risperidone 1mg daily and 4 mg qHS for psychosis; plan to titrate as tolerated; R/B/A and side effects discussed  	- Depakote ER 1000mg BID, VPA level ordered for 3/14 (18:00)  	- PRN melatonin 3 mg qHS for insomnia  4. Therapy: group & milieu therapy as appropriate  5. Medical: No acute medical needs identified  	- CBC & CMP WNL, TSH 0.63, utox negative, blood alcohol negative  6. Collateral: ongoing  7. Disposition: ongoing, working on [place]  4/20 Patient is a 32-year-old male; unclear current social hx, hx of homelessness; PPHx schizophrenia, per PSYCKES of various dx (mood, psychotic, anxiety, adjustment, substance disorders), prior admissions, denies hx of suicidality or violence; no known PMHx; BIB EMS; psychiatry consulted for evaluation; transferred to [place] [address] for ongoing psychiatric care.     Working diagnosis of schizophrenia spectrum illness per history including schizophrenia, schizoaffective disorder, substance-induced mood/psychotic disorder; R/O personality disorder.    Currently, patient reports some paranoia and thought confusion, denying all other psychotic symptoms. Of note, pt with multiple other MRNs in [place] and 3 ED visits in 24 hours. Assessment limited as pt is a limited historian and minimally engaged.  Pt with hx of multiple prior inpatient admissions but also hx of malingering for food/shelter. Currently denies any thoughts of self harm, passive or active SI, or HI. No known recent substance use, utox negative and blood alcohol negative. Risperidone titrated to 1mg AM, 4mg qHS. Patient refused MEDINA.    Continued inpatient admission required for safety, stabilization, medication optimization, safe discharge planning.  Patient remains compliant with standing medications, has declined MEDINA.  Discussing dispo planning for next week, SW submitting shelter application.  Pt denies SI/HI, denies AVH.  Has been in fair behavioral control.  Although symptoms have attenuated, symptoms of internal preoccupation are still present.       Plan  1. Legal: Admitted on 9.27 status  2. Safety: No reported SI/SIB/HI/VI currently on unit; continue routine observation.   	- Psychotropic PRN medications for safety  3. Psychiatric:   	- Risperidone 1mg daily and 4 mg qHS for psychosis; plan to titrate as tolerated; R/B/A and side effects discussed  	- Depakote ER 1000mg BID, VPA level 86.20 on 4/14  	- PRN melatonin 3 mg qHS for insomnia  4. Therapy: group & milieu therapy as appropriate  5. Medical: No acute medical needs identified  	- CBC & CMP WNL, TSH 0.63, utox negative, blood alcohol negative  6. Collateral: ongoing  7. Disposition: ongoing, working on MI  4/20

## 2023-04-17 NOTE — BH INPATIENT PSYCHIATRY PROGRESS NOTE - NSDCCRITERIA_PSY_ALL_CORE
Pt. on 100% nonrebreather, lethargic throughout shift CGI<=3  symptom stabilization  outpatient follow up  shelter

## 2023-04-17 NOTE — BH INPATIENT PSYCHIATRY PROGRESS NOTE - CURRENT MEDICATION
MEDICATIONS  (STANDING):  divalproex ER 1000 milliGRAM(s) Oral <User Schedule>  risperiDONE   Tablet 1 milliGRAM(s) Oral daily  risperiDONE   Tablet 4 milliGRAM(s) Oral at bedtime    MEDICATIONS  (PRN):  diphenhydrAMINE 50 milliGRAM(s) Oral every 4 hours PRN EPS  diphenhydrAMINE Injectable 50 milliGRAM(s) IntraMuscular once PRN EPS  haloperidol     Tablet 5 milliGRAM(s) Oral every 4 hours PRN agitation from psychosis  haloperidol    Injectable 5 milliGRAM(s) IntraMuscular once PRN combative behavior from psychosis  hydrOXYzine hydrochloride 50 milliGRAM(s) Oral every 6 hours PRN anxiety  LORazepam   Injectable 2 milliGRAM(s) IntraMuscular once PRN Severe aggression  melatonin. 3 milliGRAM(s) Oral at bedtime PRN Insomnia  nicotine  Polacrilex Gum 2 milliGRAM(s) Oral every 2 hours PRN nicotine   MEDICATIONS  (STANDING):  divalproex ER 2000 milliGRAM(s) Oral at bedtime  risperiDONE   Tablet 1 milliGRAM(s) Oral daily  risperiDONE   Tablet 4 milliGRAM(s) Oral at bedtime    MEDICATIONS  (PRN):  diphenhydrAMINE 50 milliGRAM(s) Oral every 4 hours PRN EPS  diphenhydrAMINE Injectable 50 milliGRAM(s) IntraMuscular once PRN EPS  haloperidol     Tablet 5 milliGRAM(s) Oral every 4 hours PRN agitation from psychosis  haloperidol    Injectable 5 milliGRAM(s) IntraMuscular once PRN combative behavior from psychosis  hydrOXYzine hydrochloride 50 milliGRAM(s) Oral every 6 hours PRN anxiety  LORazepam   Injectable 2 milliGRAM(s) IntraMuscular once PRN aggression  melatonin. 3 milliGRAM(s) Oral at bedtime PRN Insomnia  nicotine  Polacrilex Gum 2 milliGRAM(s) Oral every 2 hours PRN nicotine

## 2023-04-17 NOTE — BH INPATIENT PSYCHIATRY PROGRESS NOTE - NSBHCHARTREVIEWVS_PSY_A_CORE FT
Vital Signs Last 24 Hrs  T(C): 36.4 (04-17-23 @ 06:18), Max: 36.4 (04-17-23 @ 06:18)  T(F): 97.5 (04-17-23 @ 06:18), Max: 97.5 (04-17-23 @ 06:18)  HR: --  BP: --  BP(mean): --  RR: --  SpO2: --    Orthostatic VS  04-17-23 @ 08:23  Lying BP: --/-- HR: --  Sitting BP: 106/65 HR: 73  Standing BP: 110/70 HR: 77  Site: --  Mode: --  Orthostatic VS  04-16-23 @ 19:22  Lying BP: --/-- HR: --  Sitting BP: 115/69 HR: 82  Standing BP: 119/77 HR: 89  Site: --  Mode: --  Orthostatic VS  04-16-23 @ 08:09  Lying BP: 102/61 HR: 66  Sitting BP: --/-- HR: --  Standing BP: --/-- HR: --  Site: --  Mode: electronic  Orthostatic VS  04-15-23 @ 19:26  Lying BP: --/-- HR: --  Sitting BP: 121/78 HR: 85  Standing BP: 117/74 HR: 82  Site: --  Mode: --   Vital Signs Last 24 Hrs  T(C): 36.4 (04-17-23 @ 06:18), Max: 36.4 (04-17-23 @ 06:18)  T(F): 97.5 (04-17-23 @ 06:18), Max: 97.5 (04-17-23 @ 06:18)  HR: --  BP: --  BP(mean): --  RR: 17 (04-17-23 @ 08:54) (17 - 17)  SpO2: --    Orthostatic VS  04-17-23 @ 08:23  Lying BP: --/-- HR: --  Sitting BP: 106/65 HR: 73  Standing BP: 110/70 HR: 77  Site: --  Mode: --  Orthostatic VS  04-16-23 @ 19:22  Lying BP: --/-- HR: --  Sitting BP: 115/69 HR: 82  Standing BP: 119/77 HR: 89  Site: --  Mode: --  Orthostatic VS  04-16-23 @ 08:09  Lying BP: 102/61 HR: 66  Sitting BP: --/-- HR: --  Standing BP: --/-- HR: --  Site: --  Mode: electronic  Orthostatic VS  04-15-23 @ 19:26  Lying BP: --/-- HR: --  Sitting BP: 121/78 HR: 85  Standing BP: 117/74 HR: 82  Site: --  Mode: --   Vital Signs Last 24 Hrs  T(C): 36.6 (04-21-23 @ 06:01), Max: 36.6 (04-21-23 @ 06:01)  T(F): 97.8 (04-21-23 @ 06:01), Max: 97.8 (04-21-23 @ 06:01)  HR: --  BP: --  BP(mean): --  RR: --  SpO2: --    Orthostatic VS  04-21-23 @ 08:10  Lying BP: --/-- HR: --  Sitting BP: 117/94 HR: 80  Standing BP: 114/65 HR: 97  Site: --  Mode: --  Orthostatic VS  04-20-23 @ 19:28  Lying BP: --/-- HR: --  Sitting BP: 130/75 HR: 77  Standing BP: 123/78 HR: 85  Site: --  Mode: --  Orthostatic VS  04-20-23 @ 08:25  Lying BP: --/-- HR: --  Sitting BP: 104/66 HR: 66  Standing BP: 112/70 HR: 71  Site: --  Mode: --

## 2023-04-17 NOTE — BH INPATIENT PSYCHIATRY PROGRESS NOTE - NSBHFUPINTERVALHXFT_PSY_A_CORE
Patient seen and interviewed in room. No overnight concerns. Patient discussed in treatment team, discussed treatment goals, clinical progress, and medication changes. Patient has been sleeping well, skipped breakfast this morning because he was not feeling hungry but reports that aside from this morning he has been eating well and doesn't notice a decrease in appetite.     He states that his mood is "fine" this morning and he is looking forward to discharge planned for Thursday (4/20). He was calm and cooperative throughout the interview. He has not been seen pacing or speaking to self as frequently for the past several days and is more organized and calm. He is tolerating Depakote 1000mg BID and Risperidone 1mg AM and 4mg qHS. VPA level therapeutic at 86.20 on 4/14. Patient encouraged to continue taking medication, engaging in groups, and taking care of ADLs to best of ability. Pt remains compliant with medications and denies SE. Denies AH/VH. VSS.

## 2023-04-17 NOTE — BH INPATIENT PSYCHIATRY PROGRESS NOTE - MSE UNSTRUCTURED FT
JOHAN/BEH: Adult male appears tired; dressed in hospital gown; calm; poor eye contact.   SPEECH: Slow rate, regular tone and volume. One to two word answers.  MOTOR: No PMR/PMA; no other abnormal movements.   MOOD: “Fine”.   AFFECT: Dysthymic and mood congruent; restricted range; stable.   THOUGHT PROCESS: Linear, vague, and under-inclusive.   THOUGHT CONTENT: Denies passive or active SI or HI; no evidence of delusions, possible paranoia, focused on presenting symptoms.   PERCEPTION: Denies AVH. Does not appear to be responding to internal stimuli.   COGNITION: Grossly intact.   INSIGHT: Fair.   JUDGMENT: Fair.  IMPULSE CONTROL: Limited.

## 2023-04-18 RX ORDER — RISPERIDONE 4 MG/1
1 TABLET ORAL
Qty: 30 | Refills: 0
Start: 2023-04-18 | End: 2023-05-17

## 2023-04-18 RX ORDER — DIVALPROEX SODIUM 500 MG/1
4 TABLET, DELAYED RELEASE ORAL
Qty: 120 | Refills: 0
Start: 2023-04-18 | End: 2023-05-17

## 2023-04-18 RX ADMIN — Medication 50 MILLIGRAM(S): at 19:55

## 2023-04-18 RX ADMIN — Medication 3 MILLIGRAM(S): at 19:55

## 2023-04-18 RX ADMIN — HALOPERIDOL DECANOATE 5 MILLIGRAM(S): 100 INJECTION INTRAMUSCULAR at 19:54

## 2023-04-18 RX ADMIN — RISPERIDONE 4 MILLIGRAM(S): 4 TABLET ORAL at 19:54

## 2023-04-18 RX ADMIN — RISPERIDONE 1 MILLIGRAM(S): 4 TABLET ORAL at 08:42

## 2023-04-18 RX ADMIN — Medication 50 MILLIGRAM(S): at 19:54

## 2023-04-18 RX ADMIN — DIVALPROEX SODIUM 2000 MILLIGRAM(S): 500 TABLET, DELAYED RELEASE ORAL at 19:54

## 2023-04-18 NOTE — BH INPATIENT PSYCHIATRY PROGRESS NOTE - NSBHFUPINTERVALHXFT_PSY_A_CORE
Patient seen and interviewed in dayroom. Received PRN melatonin last night at 20:28p for restlessness. No other overnight concerns. Patient discussed in treatment team, discussed treatment goals, clinical progress, and medication changes.    Patient reports sleeping and eating well. He states his mood this morning is "alright". He was calm and cooperative throughout interview though continues to be very guarded and provides only one or two word answers to questions. He is tolerating Depakote 1000mg BID well with plan to change to 2000mg qHS tonight 4/18. Patient doing well on Risperidone 1mg AM and 4 mg qHS. Shortly after interview patient was observed speaking and laughing to self, appearing to respond to internal stimuli. First witnessed incident of recurrence of this behavior in several days.    He is looking forward to discharge to shelter planned for Thursday (4/20) and this morning asked about transportation to shelter. Discussed that SW will help arrange transportation and will meet with him to discuss this prior to discharge. Pt remains compliant with medications and denies SE. Pt encouraged to continue taking medication, engaging in groups, and taking care of ADLs to best of ability. Denies SI/HI. Denies AH/VH. VSS.

## 2023-04-18 NOTE — BH INPATIENT PSYCHIATRY PROGRESS NOTE - PRN MEDS
MEDICATIONS  (PRN):  diphenhydrAMINE 50 milliGRAM(s) Oral every 4 hours PRN EPS  diphenhydrAMINE Injectable 50 milliGRAM(s) IntraMuscular once PRN EPS  haloperidol     Tablet 5 milliGRAM(s) Oral every 4 hours PRN agitation from psychosis  haloperidol    Injectable 5 milliGRAM(s) IntraMuscular once PRN combative behavior from psychosis  hydrOXYzine hydrochloride 50 milliGRAM(s) Oral every 6 hours PRN anxiety  LORazepam   Injectable 2 milliGRAM(s) IntraMuscular once PRN aggression  melatonin. 3 milliGRAM(s) Oral at bedtime PRN Insomnia  nicotine  Polacrilex Gum 2 milliGRAM(s) Oral every 2 hours PRN nicotine

## 2023-04-18 NOTE — BH INPATIENT PSYCHIATRY PROGRESS NOTE - NSBHCHARTREVIEWVS_PSY_A_CORE FT
Vital Signs Last 24 Hrs  T(C): 36 (04-18-23 @ 06:17), Max: 37.2 (04-17-23 @ 19:12)  T(F): 96.8 (04-18-23 @ 06:17), Max: 98.9 (04-17-23 @ 19:12)  HR: --  BP: --  BP(mean): --  RR: 17 (04-17-23 @ 20:18) (17 - 17)  SpO2: --    Orthostatic VS  04-18-23 @ 08:11  Lying BP: --/-- HR: --  Sitting BP: 104/62 HR: 60  Standing BP: 110/74 HR: 72  Site: upper left arm  Mode: electronic  Orthostatic VS  04-17-23 @ 19:12  Lying BP: --/-- HR: --  Sitting BP: 134/79 HR: 104  Standing BP: 130/87 HR: 80  Site: --  Mode: --  Orthostatic VS  04-17-23 @ 08:23  Lying BP: --/-- HR: --  Sitting BP: 106/65 HR: 73  Standing BP: 110/70 HR: 77  Site: --  Mode: --  Orthostatic VS  04-16-23 @ 19:22  Lying BP: --/-- HR: --  Sitting BP: 115/69 HR: 82  Standing BP: 119/77 HR: 89  Site: --  Mode: --   Vital Signs Last 24 Hrs  T(C): 36.6 (04-21-23 @ 06:01), Max: 36.6 (04-21-23 @ 06:01)  T(F): 97.8 (04-21-23 @ 06:01), Max: 97.8 (04-21-23 @ 06:01)  HR: --  BP: --  BP(mean): --  RR: --  SpO2: --    Orthostatic VS  04-21-23 @ 08:10  Lying BP: --/-- HR: --  Sitting BP: 117/94 HR: 80  Standing BP: 114/65 HR: 97  Site: --  Mode: --  Orthostatic VS  04-20-23 @ 19:28  Lying BP: --/-- HR: --  Sitting BP: 130/75 HR: 77  Standing BP: 123/78 HR: 85  Site: --  Mode: --  Orthostatic VS  04-20-23 @ 08:25  Lying BP: --/-- HR: --  Sitting BP: 104/66 HR: 66  Standing BP: 112/70 HR: 71  Site: --  Mode: --

## 2023-04-18 NOTE — BH INPATIENT PSYCHIATRY PROGRESS NOTE - NSBHATTESTCOMMENTATTENDFT_PSY_A_CORE
JOHAN supervised student and independently performed history, exam and medical decision making.     -----    Attending: "I independently performed the documented medical decision making" indicates that I discussed with the treatment team and reviewed and revised treatment plan including medications with JOHAN based on history and exam performed by JOHAN.

## 2023-04-18 NOTE — BH INPATIENT PSYCHIATRY PROGRESS NOTE - CURRENT MEDICATION
MEDICATIONS  (STANDING):  divalproex ER 2000 milliGRAM(s) Oral at bedtime  risperiDONE   Tablet 1 milliGRAM(s) Oral daily  risperiDONE   Tablet 4 milliGRAM(s) Oral at bedtime    MEDICATIONS  (PRN):  diphenhydrAMINE 50 milliGRAM(s) Oral every 4 hours PRN EPS  diphenhydrAMINE Injectable 50 milliGRAM(s) IntraMuscular once PRN EPS  haloperidol     Tablet 5 milliGRAM(s) Oral every 4 hours PRN agitation from psychosis  haloperidol    Injectable 5 milliGRAM(s) IntraMuscular once PRN combative behavior from psychosis  hydrOXYzine hydrochloride 50 milliGRAM(s) Oral every 6 hours PRN anxiety  LORazepam   Injectable 2 milliGRAM(s) IntraMuscular once PRN aggression  melatonin. 3 milliGRAM(s) Oral at bedtime PRN Insomnia  nicotine  Polacrilex Gum 2 milliGRAM(s) Oral every 2 hours PRN nicotine

## 2023-04-18 NOTE — BH INPATIENT PSYCHIATRY PROGRESS NOTE - NSBHASSESSSUMMFT_PSY_ALL_CORE
Patient is a 32-year-old male; unclear current social hx, hx of homelessness; PPHx schizophrenia, per PSYCKES of various dx (mood, psychotic, anxiety, adjustment, substance disorders), prior admissions, denies hx of suicidality or violence; no known PMHx; BIB EMS; psychiatry consulted for evaluation; transferred to [place] [address] for ongoing psychiatric care.     Working diagnosis of schizophrenia spectrum illness per history including schizophrenia, schizoaffective disorder, substance-induced mood/psychotic disorder; R/O personality disorder.    Currently, patient reports some paranoia and thought confusion, denying all other psychotic symptoms. Of note, pt with multiple other MRNs in [place] and 3 ED visits in 24 hours. Assessment limited as pt is a limited historian and minimally engaged.  Pt with hx of multiple prior inpatient admissions but also hx of malingering for food/shelter. Currently denies any thoughts of self harm, passive or active SI, or HI. No known recent substance use, utox negative and blood alcohol negative. Risperidone titrated to 1mg AM, 4mg qHS. Patient refused MEDINA.    Continued inpatient admission required for safety, stabilization, medication optimization, safe discharge planning.  Patient remains compliant with standing medications, has declined MEDINA.  Discussing dispo planning for next week, SW submitting shelter application.  Pt denies SI/HI, denies AVH.  Has been in fair behavioral control.  Although symptoms have attenuated, symptoms of internal preoccupation are still present.       Plan  1. Legal: Admitted on 9.27 status  2. Safety: No reported SI/SIB/HI/VI currently on unit; continue routine observation.   	- Psychotropic PRN medications for safety  3. Psychiatric:   	- Risperidone 1mg daily and 4 mg qHS for psychosis; plan to titrate as tolerated; R/B/A and side effects discussed  	- Depakote ER 1000mg BID, VPA level 86.20 on 4/14  	- PRN melatonin 3 mg qHS for insomnia  4. Therapy: group & milieu therapy as appropriate  5. Medical: No acute medical needs identified  	- CBC & CMP WNL, TSH 0.63, utox negative, blood alcohol negative  6. Collateral: ongoing  7. Disposition: ongoing, working on [place]  4/20 Patient is a 32-year-old male; unclear current social hx, hx of homelessness; PPHx schizophrenia, per PSYCKES of various dx (mood, psychotic, anxiety, adjustment, substance disorders), prior admissions, denies hx of suicidality or violence; no known PMHx; BIB EMS; psychiatry consulted for evaluation; transferred to [place] [address] for ongoing psychiatric care.     Working diagnosis of schizophrenia spectrum illness per history including schizophrenia, schizoaffective disorder, substance-induced mood/psychotic disorder; R/O personality disorder. Of note, pt with multiple other MRNs in [place] and 3 ED visits in 24 hours. Assessment limited as pt is a limited historian and minimally engaged.  Pt with hx of multiple prior inpatient admissions but also hx of malingering for food/shelter. No known recent substance use, utox negative and blood alcohol negative. Continued inpatient admission required for safety, stabilization, medication optimization, safe discharge planning.    Patient remains compliant with standing medications. Risperidone titrated to 1mg AM, 4mg qHS. Patient refused MEDINA. Depakote level therapeutic on 4/14. Currently on 1000mg BID with plan to change to 2000mg qHS 4/18. Discharge to shelter planned for 4/20.  Pt denies SI/HI, denies AVH.  Has been in fair behavioral control.  Although symptoms have attenuated, symptoms of internal preoccupation are still present.     Plan  1. Legal: Admitted on 9.27 status  2. Safety: No reported SI/SIB/HI/VI currently on unit; continue routine observation.   	- Psychotropic PRN medications for safety  3. Psychiatric:   	- Risperidone 1mg daily and 4 mg qHS for psychosis; plan to titrate as tolerated; R/B/A and side effects discussed  	- Depakote ER 1000mg BID, VPA level 86.20 on 4/14 planned to change to 2000mg qHS on 4/18  	- PRN melatonin 3 mg qHS for insomnia  4. Therapy: group & milieu therapy as appropriate  5. Medical: No acute medical needs identified  	- CBC & CMP WNL, TSH 0.63, utox negative, blood alcohol negative  6. Collateral: ongoing  7. Disposition: ongoing, working on [place] 4/20

## 2023-04-18 NOTE — BH INPATIENT PSYCHIATRY PROGRESS NOTE - MSE UNSTRUCTURED FT
JOHAN/BEH: Adult male appears tired; dressed in hospital gown; calm; poor eye contact.   SPEECH: Slow rate, regular tone and volume. One to two word answers.  MOTOR: No PMR/PMA; no other abnormal movements.   MOOD: “Fine”.   AFFECT: Dysthymic and mood congruent; restricted range; stable.   THOUGHT PROCESS: Linear, vague, and under-inclusive.   THOUGHT CONTENT: Denies passive or active SI or HI; no evidence of delusions, possible paranoia, focused on presenting symptoms.   PERCEPTION: Denies AVH. Does not appear to be responding to internal stimuli.   COGNITION: Grossly intact.   INSIGHT: Fair.   JUDGMENT: Fair.  IMPULSE CONTROL: Limited.   JOHAN/BEH: Adult male appears tired; dressed in shirt and pants; calm; poor eye contact.   SPEECH: Slow rate, regular tone and volume. One to two word answers.  MOTOR: No PMR/PMA; no other abnormal movements.   MOOD: “Fine”.   AFFECT: Dysthymic and mood congruent; restricted range; stable.   THOUGHT PROCESS: Linear, vague, and under-inclusive.   THOUGHT CONTENT: Denies passive or active SI or HI; no evidence of delusions, possible paranoia, focused on presenting symptoms.   PERCEPTION: Denies AVH. Occasionally responding to internal stimuli (laughing and talking to self).   COGNITION: Grossly intact.   INSIGHT: Fair.   JUDGMENT: Fair.  IMPULSE CONTROL: Limited.

## 2023-04-19 RX ORDER — RISPERIDONE 4 MG/1
1 TABLET ORAL
Qty: 30 | Refills: 0
Start: 2023-04-19 | End: 2023-05-18

## 2023-04-19 RX ADMIN — Medication 50 MILLIGRAM(S): at 20:13

## 2023-04-19 RX ADMIN — RISPERIDONE 4 MILLIGRAM(S): 4 TABLET ORAL at 20:13

## 2023-04-19 RX ADMIN — DIVALPROEX SODIUM 2000 MILLIGRAM(S): 500 TABLET, DELAYED RELEASE ORAL at 20:13

## 2023-04-19 RX ADMIN — HALOPERIDOL DECANOATE 5 MILLIGRAM(S): 100 INJECTION INTRAMUSCULAR at 20:13

## 2023-04-19 RX ADMIN — RISPERIDONE 1 MILLIGRAM(S): 4 TABLET ORAL at 08:57

## 2023-04-19 NOTE — BH INPATIENT PSYCHIATRY PROGRESS NOTE - NSBHASSESSSUMMFT_PSY_ALL_CORE
Per Patient, Dr. Rosales told him to take 1.5 tablets daily of furosemide. It's not documented. Pt says it's been hard to cut the tablets, so he's just been taking 2 tablets daily. I reminded pt to complete the lab work that Dr. Rosales ordered back in December. Per patient he will get those labs done before his next appt on 4/14/22.    Patient is a 32-year-old male; unclear current social hx, hx of homelessness; PPHx schizophrenia, per PSYCKES of various dx (mood, psychotic, anxiety, adjustment, substance disorders), prior admissions, denies hx of suicidality or violence; no known PMHx; BIB EMS; psychiatry consulted for evaluation; transferred to [place] [address] for ongoing psychiatric care.     Working diagnosis of schizophrenia spectrum illness per history including schizophrenia, schizoaffective disorder, substance-induced mood/psychotic disorder; R/O personality disorder. Of note, pt with multiple other MRNs in [place] and 3 ED visits in 24 hours. Assessment limited as pt is a limited historian and minimally engaged.  Pt with hx of multiple prior inpatient admissions but also hx of malingering for food/shelter. No known recent substance use, utox negative and blood alcohol negative. Continued inpatient admission required for safety, stabilization, medication optimization, safe discharge planning.    Patient remains compliant with standing medications. Risperidone titrated to 1mg AM, 4mg qHS. Patient refused MEDINA. Depakote level therapeutic on 4/14. Currently on 1000mg BID with plan to change to 2000mg qHS 4/18. Discharge to shelter planned for 4/20.  Pt denies SI/HI, denies AVH.  Has been in fair behavioral control.  Although symptoms have attenuated, symptoms of internal preoccupation are still present.     Plan  1. Legal: Admitted on 9.27 status  2. Safety: No reported SI/SIB/HI/VI currently on unit; continue routine observation.   	- Psychotropic PRN medications for safety  3. Psychiatric:   	- Risperidone 1mg daily and 4 mg qHS for psychosis; plan to titrate as tolerated; R/B/A and side effects discussed  	- Depakote ER 1000mg BID, VPA level 86.20 on 4/14 planned to change to 2000mg qHS on 4/18  	- PRN melatonin 3 mg qHS for insomnia  4. Therapy: group & milieu therapy as appropriate  5. Medical: No acute medical needs identified  	- CBC & CMP WNL, TSH 0.63, utox negative, blood alcohol negative  6. Collateral: ongoing  7. Disposition: ongoing, working on [place] 4/20 Patient is a 32-year-old male; unclear current social hx, hx of homelessness; PPHx schizophrenia, per PSYCKES of various dx (mood, psychotic, anxiety, adjustment, substance disorders), prior admissions, denies hx of suicidality or violence; no known PMHx; BIB EMS; psychiatry consulted for evaluation; transferred to [place] [id number] for ongoing psychiatric care. Working diagnosis of schizophrenia spectrum illness per history including schizophrenia, schizoaffective disorder, substance-induced mood/psychotic disorder; R/O personality disorder. Of note, pt with multiple other MRNs in [place] and 3 ED visits in 24 hours. Assessment limited as pt is a limited historian and minimally engaged.  Pt with hx of multiple prior inpatient admissions but also hx of malingering for food/shelter. No known recent substance use, utox negative and blood alcohol negative. Continued inpatient admission required for safety, stabilization, medication optimization, safe discharge planning.    Patient remains compliant with standing medications. Risperidone titrated to 1mg AM, 4mg qHS. Patient refused MEDINA. Depakote level therapeutic on 4/14. Currently on 2000mg qHS 4/18. Discharge to shelter planned for 4/20.  Pt denies SI/HI, denies AVH.  Has been in fair behavioral control.  Although symptoms have attenuated, symptoms of internal preoccupation are still present. Patient appears more agitated 4/19 than usual, required PRNs overnight for agitation.    Plan  1. Legal: Admitted on 9.27 status  2. Safety: No reported SI/SIB/HI/VI currently on unit; continue routine observation.   	- Psychotropic PRN medications for safety  3. Psychiatric:   	- Risperidone 1mg daily and 4 mg qHS for psychosis; plan to titrate as tolerated; R/B/A and side effects discussed  	- Depakote ER 1000mg BID, VPA level 86.20 on 4/14 planned to change to 2000mg qHS on 4/18  	- PRN melatonin 3 mg qHS for insomnia  4. Therapy: group & milieu therapy as appropriate  5. Medical: No acute medical needs identified  	- CBC & CMP WNL, TSH 0.63, utox negative, blood alcohol negative  6. Collateral: ongoing  7. Disposition: ongoing, working on dc to shelter 4/20

## 2023-04-19 NOTE — BH INPATIENT PSYCHIATRY PROGRESS NOTE - CURRENT MEDICATION
MEDICATIONS  (STANDING):  divalproex ER 2000 milliGRAM(s) Oral at bedtime  risperiDONE   Tablet 4 milliGRAM(s) Oral at bedtime  risperiDONE   Tablet 1 milliGRAM(s) Oral daily    MEDICATIONS  (PRN):  diphenhydrAMINE 50 milliGRAM(s) Oral every 4 hours PRN EPS  diphenhydrAMINE Injectable 50 milliGRAM(s) IntraMuscular once PRN EPS  haloperidol     Tablet 5 milliGRAM(s) Oral every 4 hours PRN agitation from psychosis  haloperidol    Injectable 5 milliGRAM(s) IntraMuscular once PRN combative behavior from psychosis  hydrOXYzine hydrochloride 50 milliGRAM(s) Oral every 6 hours PRN anxiety  LORazepam   Injectable 2 milliGRAM(s) IntraMuscular once PRN aggression  melatonin. 3 milliGRAM(s) Oral at bedtime PRN Insomnia  nicotine  Polacrilex Gum 2 milliGRAM(s) Oral every 2 hours PRN nicotine   MEDICATIONS  (STANDING):  divalproex ER 2000 milliGRAM(s) Oral at bedtime  risperiDONE   Tablet 1 milliGRAM(s) Oral daily  risperiDONE   Tablet 4 milliGRAM(s) Oral at bedtime    MEDICATIONS  (PRN):  diphenhydrAMINE 50 milliGRAM(s) Oral every 4 hours PRN EPS  diphenhydrAMINE Injectable 50 milliGRAM(s) IntraMuscular once PRN EPS  haloperidol     Tablet 5 milliGRAM(s) Oral every 4 hours PRN agitation from psychosis  haloperidol    Injectable 5 milliGRAM(s) IntraMuscular once PRN combative behavior from psychosis  hydrOXYzine hydrochloride 50 milliGRAM(s) Oral every 6 hours PRN anxiety  LORazepam   Injectable 2 milliGRAM(s) IntraMuscular once PRN aggression  melatonin. 3 milliGRAM(s) Oral at bedtime PRN Insomnia  nicotine  Polacrilex Gum 2 milliGRAM(s) Oral every 2 hours PRN nicotine

## 2023-04-19 NOTE — BH INPATIENT PSYCHIATRY PROGRESS NOTE - NSBHFUPINTERVALHXFT_PSY_A_CORE
Patient seen and interviewed in [facility]. Patient received PRN Benadryl 50mg, Haldol 5mg, Atarax 50mg, and melatonin 3mg at 19:54 for agitation was suddenly shouting to self for unclear reason. No other overnight concerns. Patient discussed in treatment team, discussed treatment goals, clinical progress, and medication changes.     Patient reports sleeping and eating well. Patient appears more agitated this morning, pacing quickly around the unit and speaking to himself. Occasionally singing/rapping loudly to self. On interview patient appeared upset and when asked how he was feeling he stated "I'm alright I just want to leave". He began to elaborate further but appeared to have a difficult time finding words/verbalizing how he was feeling and stopped due to frustration. Patient has previously stated his "brain is broken" and when asked if his brain was feeling different today he was not able to answer. During interview patient suddenly laughed at an odd time for no clear reason.    Patient remains compliant with medication and denies SE, currently on Risperidone 1mg AM and 4mg qHS and Depakote 2000mg qHS. Patient encouraged to continue taking medication, engaging in groups, and taking care of ADLs to best of ability. Denies AH/VH. VSS.

## 2023-04-19 NOTE — BH INPATIENT PSYCHIATRY PROGRESS NOTE - NSBHCHARTREVIEWVS_PSY_A_CORE FT
Vital Signs Last 24 Hrs  T(C): 36.2 (04-19-23 @ 06:07), Max: 36.6 (04-18-23 @ 19:18)  T(F): 97.2 (04-19-23 @ 06:07), Max: 97.9 (04-18-23 @ 19:18)  HR: --  BP: --  BP(mean): --  RR: 18 (04-18-23 @ 20:43) (18 - 18)  SpO2: --    Orthostatic VS  04-18-23 @ 19:18  Lying BP: --/-- HR: --  Sitting BP: 124/67 HR: 78  Standing BP: 120/71 HR: 79  Site: --  Mode: --  Orthostatic VS  04-18-23 @ 08:11  Lying BP: --/-- HR: --  Sitting BP: 104/62 HR: 60  Standing BP: 110/74 HR: 72  Site: upper left arm  Mode: electronic  Orthostatic VS  04-17-23 @ 19:12  Lying BP: --/-- HR: --  Sitting BP: 134/79 HR: 104  Standing BP: 130/87 HR: 80  Site: --  Mode: --  Orthostatic VS  04-17-23 @ 08:23  Lying BP: --/-- HR: --  Sitting BP: 106/65 HR: 73  Standing BP: 110/70 HR: 77  Site: --  Mode: --   Vital Signs Last 24 Hrs  T(C): 36.2 (04-19-23 @ 06:07), Max: 36.6 (04-18-23 @ 19:18)  T(F): 97.2 (04-19-23 @ 06:07), Max: 97.9 (04-18-23 @ 19:18)  HR: --  BP: --  BP(mean): --  RR: 18 (04-18-23 @ 20:43) (18 - 18)  SpO2: --    Orthostatic VS  04-18-23 @ 19:18  Lying BP: --/-- HR: --  Sitting BP: 124/67 HR: 78  Standing BP: 120/71 HR: 79  Site: --  Mode: --  Orthostatic VS  04-18-23 @ 08:11  Lying BP: --/-- HR: --  Sitting BP: 104/62 HR: 60  Standing BP: 110/74 HR: 72  Site: upper left arm  Mode: electronic  Orthostatic VS  04-17-23 @ 19:12  Lying BP: --/-- HR: --  Sitting BP: 134/79 HR: 104  Standing BP: 130/87 HR: 80  Site: --  Mode: --   Vital Signs Last 24 Hrs  T(C): 36.6 (04-21-23 @ 06:01), Max: 36.6 (04-21-23 @ 06:01)  T(F): 97.8 (04-21-23 @ 06:01), Max: 97.8 (04-21-23 @ 06:01)  HR: --  BP: --  BP(mean): --  RR: --  SpO2: --    Orthostatic VS  04-21-23 @ 08:10  Lying BP: --/-- HR: --  Sitting BP: 117/94 HR: 80  Standing BP: 114/65 HR: 97  Site: --  Mode: --  Orthostatic VS  04-20-23 @ 19:28  Lying BP: --/-- HR: --  Sitting BP: 130/75 HR: 77  Standing BP: 123/78 HR: 85  Site: --  Mode: --  Orthostatic VS  04-20-23 @ 08:25  Lying BP: --/-- HR: --  Sitting BP: 104/66 HR: 66  Standing BP: 112/70 HR: 71  Site: --  Mode: --

## 2023-04-19 NOTE — BH INPATIENT PSYCHIATRY PROGRESS NOTE - MSE UNSTRUCTURED FT
JOHAN/BEH: Adult male appears tired; dressed in shirt and pants; calm; poor eye contact.   SPEECH: Slow rate, regular tone and volume. One to two word answers.  MOTOR: No PMR/PMA; no other abnormal movements.   MOOD: “Fine”.   AFFECT: Dysthymic and mood congruent; restricted range; stable.   THOUGHT PROCESS: Linear, vague, and under-inclusive.   THOUGHT CONTENT: Denies passive or active SI or HI; no evidence of delusions, possible paranoia, focused on presenting symptoms.   PERCEPTION: Denies AVH. Occasionally responding to internal stimuli (laughing and talking to self).   COGNITION: Grossly intact.   INSIGHT: Fair.   JUDGMENT: Fair.  IMPULSE CONTROL: Limited.   JOHAN/BEH: Adult male appears tired; dressed in shirt and pants; agitated, pacing; poor eye contact.   SPEECH: Slow rate, regular tone and volume. One to two word answers.  MOTOR: No PMR/PMA; no other abnormal movements.   MOOD: “Fine”.   AFFECT: Dysthymic and mood congruent; restricted range; stable.   THOUGHT PROCESS: Linear, vague, and under-inclusive.   THOUGHT CONTENT: Denies passive or active SI or HI; no evidence of delusions, possible paranoia, focused on presenting symptoms.   PERCEPTION: Denies AVH. Occasionally responding to internal stimuli (laughing and talking to self).   COGNITION: Grossly intact.   INSIGHT: Fair.   JUDGMENT: Fair.  IMPULSE CONTROL: Limited.

## 2023-04-20 LAB
ALBUMIN SERPL ELPH-MCNC: 4.5 G/DL — SIGNIFICANT CHANGE UP (ref 3.3–5)
ALP SERPL-CCNC: 84 U/L — SIGNIFICANT CHANGE UP (ref 40–120)
ALT FLD-CCNC: 15 U/L — SIGNIFICANT CHANGE UP (ref 4–41)
ANION GAP SERPL CALC-SCNC: 16 MMOL/L — HIGH (ref 7–14)
AST SERPL-CCNC: 18 U/L — SIGNIFICANT CHANGE UP (ref 4–40)
BASOPHILS # BLD AUTO: 0.02 K/UL — SIGNIFICANT CHANGE UP (ref 0–0.2)
BASOPHILS NFR BLD AUTO: 0.6 % — SIGNIFICANT CHANGE UP (ref 0–2)
BILIRUB SERPL-MCNC: 0.5 MG/DL — SIGNIFICANT CHANGE UP (ref 0.2–1.2)
BUN SERPL-MCNC: 11 MG/DL — SIGNIFICANT CHANGE UP (ref 7–23)
CALCIUM SERPL-MCNC: 9.3 MG/DL — SIGNIFICANT CHANGE UP (ref 8.4–10.5)
CHLORIDE SERPL-SCNC: 102 MMOL/L — SIGNIFICANT CHANGE UP (ref 98–107)
CO2 SERPL-SCNC: 23 MMOL/L — SIGNIFICANT CHANGE UP (ref 22–31)
CREAT SERPL-MCNC: 1.04 MG/DL — SIGNIFICANT CHANGE UP (ref 0.5–1.3)
EGFR: 98 ML/MIN/1.73M2 — SIGNIFICANT CHANGE UP
EOSINOPHIL # BLD AUTO: 0.22 K/UL — SIGNIFICANT CHANGE UP (ref 0–0.5)
EOSINOPHIL NFR BLD AUTO: 6.3 % — HIGH (ref 0–6)
GLUCOSE SERPL-MCNC: 94 MG/DL — SIGNIFICANT CHANGE UP (ref 70–99)
HCT VFR BLD CALC: 44.8 % — SIGNIFICANT CHANGE UP (ref 39–50)
HGB BLD-MCNC: 15.3 G/DL — SIGNIFICANT CHANGE UP (ref 13–17)
IANC: 1.38 K/UL — LOW (ref 1.8–7.4)
IMM GRANULOCYTES NFR BLD AUTO: 0.3 % — SIGNIFICANT CHANGE UP (ref 0–0.9)
LYMPHOCYTES # BLD AUTO: 1.63 K/UL — SIGNIFICANT CHANGE UP (ref 1–3.3)
LYMPHOCYTES # BLD AUTO: 46.7 % — HIGH (ref 13–44)
MCHC RBC-ENTMCNC: 29.1 PG — SIGNIFICANT CHANGE UP (ref 27–34)
MCHC RBC-ENTMCNC: 34.2 GM/DL — SIGNIFICANT CHANGE UP (ref 32–36)
MCV RBC AUTO: 85.2 FL — SIGNIFICANT CHANGE UP (ref 80–100)
MONOCYTES # BLD AUTO: 0.23 K/UL — SIGNIFICANT CHANGE UP (ref 0–0.9)
MONOCYTES NFR BLD AUTO: 6.6 % — SIGNIFICANT CHANGE UP (ref 2–14)
NEUTROPHILS # BLD AUTO: 1.38 K/UL — LOW (ref 1.8–7.4)
NEUTROPHILS NFR BLD AUTO: 39.5 % — LOW (ref 43–77)
NRBC # BLD: 0 /100 WBCS — SIGNIFICANT CHANGE UP (ref 0–0)
NRBC # FLD: 0 K/UL — SIGNIFICANT CHANGE UP (ref 0–0)
PLATELET # BLD AUTO: 224 K/UL — SIGNIFICANT CHANGE UP (ref 150–400)
POTASSIUM SERPL-MCNC: 3.9 MMOL/L — SIGNIFICANT CHANGE UP (ref 3.5–5.3)
POTASSIUM SERPL-SCNC: 3.9 MMOL/L — SIGNIFICANT CHANGE UP (ref 3.5–5.3)
PROT SERPL-MCNC: 7 G/DL — SIGNIFICANT CHANGE UP (ref 6–8.3)
RBC # BLD: 5.26 M/UL — SIGNIFICANT CHANGE UP (ref 4.2–5.8)
RBC # FLD: 13.3 % — SIGNIFICANT CHANGE UP (ref 10.3–14.5)
SARS-COV-2 RNA SPEC QL NAA+PROBE: SIGNIFICANT CHANGE UP
SODIUM SERPL-SCNC: 141 MMOL/L — SIGNIFICANT CHANGE UP (ref 135–145)
WBC # BLD: 3.49 K/UL — LOW (ref 3.8–10.5)
WBC # FLD AUTO: 3.49 K/UL — LOW (ref 3.8–10.5)

## 2023-04-20 RX ADMIN — RISPERIDONE 1 MILLIGRAM(S): 4 TABLET ORAL at 08:23

## 2023-04-20 RX ADMIN — RISPERIDONE 4 MILLIGRAM(S): 4 TABLET ORAL at 20:09

## 2023-04-20 RX ADMIN — DIVALPROEX SODIUM 2000 MILLIGRAM(S): 500 TABLET, DELAYED RELEASE ORAL at 20:09

## 2023-04-20 RX ADMIN — Medication 50 MILLIGRAM(S): at 20:09

## 2023-04-20 NOTE — BH INPATIENT PSYCHIATRY PROGRESS NOTE - NSBHASSESSSUMMFT_PSY_ALL_CORE
Patient is a 32-year-old male; unclear current social hx, hx of homelessness; PPHx schizophrenia, per PSYCKES of various dx (mood, psychotic, anxiety, adjustment, substance disorders), prior admissions, denies hx of suicidality or violence; no known PMHx; BIB EMS; psychiatry consulted for evaluation; transferred to [place] [address] for ongoing psychiatric care. Working diagnosis of schizophrenia spectrum illness per history including schizophrenia, schizoaffective disorder, substance-induced mood/psychotic disorder; R/O personality disorder. Of note, pt with multiple other MRNs in [place] and 3 ED visits in 24 hours. Assessment limited as pt is a limited historian and minimally engaged.  Pt with hx of multiple prior inpatient admissions but also hx of malingering for food/shelter. No known recent substance use, utox negative and blood alcohol negative. Continued inpatient admission required for safety, stabilization, medication optimization, safe discharge planning.    Patient remains compliant with standing medications. Risperidone titrated to 1mg AM, 4mg qHS. Patient refused MEDINA. Depakote level therapeutic on 4/14. Currently on 2000mg qHS 4/18. Discharge to shelter planned for 4/20.  Pt denies SI/HI, denies AVH.  Has been in fair behavioral control.  Although symptoms have attenuated, symptoms of internal preoccupation are still present. Patient appears more agitated 4/19 than usual, required PRNs overnight for agitation.    Plan  1. Legal: Admitted on 9.27 status  2. Safety: No reported SI/SIB/HI/VI currently on unit; continue routine observation.   	- Psychotropic PRN medications for safety  3. Psychiatric:   	- Risperidone 1mg daily and 4 mg qHS for psychosis; plan to titrate as tolerated; R/B/A and side effects discussed  	- Depakote ER 1000mg BID, VPA level 86.20 on 4/14 planned to change to 2000mg qHS on 4/18  	- PRN melatonin 3 mg qHS for insomnia  4. Therapy: group & milieu therapy as appropriate  5. Medical: No acute medical needs identified  	- CBC & CMP WNL, TSH 0.63, utox negative, blood alcohol negative  6. Collateral: ongoing  7. Disposition: ongoing, working on dc to shelter 4/20 Patient is a 32-year-old male; unclear current social hx, hx of homelessness; PPHx schizophrenia, per PSYCKES of various dx (mood, psychotic, anxiety, adjustment, substance disorders), prior admissions, denies hx of suicidality or violence; no known PMHx; BIB EMS; psychiatry consulted for evaluation; transferred to [place] [address] for ongoing psychiatric care. Working diagnosis of schizophrenia spectrum illness per history including schizophrenia, schizoaffective disorder, substance-induced mood/psychotic disorder; R/O personality disorder. Of note, pt with multiple other MRNs in [place] and 3 ED visits in 24 hours. Assessment limited as pt is a limited historian and minimally engaged.  Pt with hx of multiple prior inpatient admissions but also hx of malingering for food/shelter. No known recent substance use, utox negative and blood alcohol negative. Continued inpatient admission required for safety, stabilization, medication optimization, safe discharge planning.    Patient remains compliant with standing medications. Risperidone titrated to 1mg AM, 4mg qHS. Patient refused MEDINA. Depakote level therapeutic on 4/14. Currently on 2000mg qHS since 4/18. Required PRNs for agitation/insomnia at night 4/19. Discharge to shelter planned for 4/20 delayed due to delay in COVID testing. Rescheduled for tomorrow 4/21. Patient adapted well to this change and was understanding.  Pt denies SI/HI, denies AVH.  Has been in fair behavioral control.  Although symptoms have attenuated, symptoms of internal preoccupation are still present. f/u CBC, CMP, and COVID PCR prior to d/c.    Plan  1. Legal: Admitted on 9.27 status  2. Safety: No reported SI/SIB/HI/VI currently on unit; continue routine observation.   	- Psychotropic PRN medications for safety  3. Psychiatric:   	- Risperidone 1mg daily and 4 mg qHS for psychosis; plan to titrate as tolerated; R/B/A and side effects discussed  	- Depakote ER 2000mg qHS, VPA level 86.20 on 4/14  	- PRN melatonin 3 mg qHS for insomnia  4. Therapy: group & milieu therapy as appropriate  5. Medical: No acute medical needs identified  	- CBC & CMP WNL, TSH 0.63, utox negative, blood alcohol negative  	- f/u CBC, CMP, COVID swab from 4/20  6. Collateral: ongoing  7. Disposition: ongoing, planned for D/C to shelter 4/21 pending COVID testing

## 2023-04-20 NOTE — BH DISCHARGE NOTE NURSING/SOCIAL WORK/PSYCH REHAB - PATIENT PORTAL LINK FT
You can access the FollowMyHealth Patient Portal offered by [facility] by registering at the following website: [email or web address]. By joining [facility]’s FollowMyHealth portal, you will also be able to view your health information using other applications (apps) compatible with our system.

## 2023-04-20 NOTE — BH DISCHARGE NOTE NURSING/SOCIAL WORK/PSYCH REHAB - NSDCPETBCESMAN_GEN_ALL_CORE
Outpatient Physical Therapy  [place]           [x] Phone: [phone]   Fax: [phone]  Jennifer Saunders           [] Phone: [phone]   Fax: [phone]        Physical Therapy Daily Treatment Note  Date:  2021    Patient Name:  Cristina Kat    :  1956  MRN: [id number]  Restrictions/Precautions:  Other position/activity restrictions: none  Diagnosis:   Diagnosis: M47.812- crvical spondylosis  Date of Injury/Surgery:   Treatment Diagnosis: Treatment Diagnosis: neck pain    Insurance/Certification information: PT Insurance Information: [facility] CP- needs pre cert   Referring Physician:  Referring Practitioner: Allan Hunt Doctor Visit:    Plan of care signed (Y/N):    Outcome Measure:  NDI 18/50  Visit# / total visits:   then PN  Pain level: 2/10 R c- spine  Goals:     Patient goals : no neck pain     Long term goals  Time Frame for Long term goals : plan expires 21  Long term goal 1: patient will be independent with HEP  Long term goal 2: patient will score 10/50 on [facility]    ASSESSMENT  Patient primary complaints: neck pain  History of condition: Ongoing  R sided neck pain since - ; insidious onset;constant  Current functional limitations:  Able to complete all necessary ADLs/IADLs with neck pain present  Clinical findings:NDI 18/50, neck ROT 60* R/L  PLOF:Pt was independent with ADLs/IADLs without significant pain or difficulties prior to July of this year  Patient's response to treatment:able to perform HEP, neck pain present @ end of session  Skilled PT interventions are intended to:  Decrease pain which will enable patient  to return to PLOF  Patient agrees with established plan of care and assisted in the development of their  goals  Barriers to learning:none- no mental/cognitive barriers observed  Preferred learning style(s):   written- demonstration -practice  Preferred Language: English  Potential barriers to progress:none  The patient appears motivated to participate in PT and regain PLOF: yes    Subjective: Patient rates his pain 2/10 today. Continues to have the pain in his stomach. Has an appointment for a scope . Any changes in Ambulatory Summary Sheet? None        Objective:      COVID screening questions were asked and patient attested that there had been no contact or symptoms        Exercises: (No more than 4 columns)   Exercise/Equipment 2021           WARM UP         UBE F/B 2/2 F/B 2/2 F/B 2/2         TABLE      Manual therapy      chin tucks Supine 2x10x5\" Supine 2x10x5\"    Taffy Pulls   RTB 20x                   STANDING      LAE/ lat rows Pink TT 2x10 Pink TT 20x ea  Pink TT 20x ea                                             PROPRIOCEPTION                                    MODALITIES                      Other Therapeutic Activities/Education:        Home Exercise Program:  Chin tucks/ cervical ROT      Manual Treatments:  Supine STM UT/ cervical spine distraction      Modalities:        Communication with other providers:        Assessment:  (Response towards treatment session) (Pain Rating)  Patient rated his pain 1/10 after treatment. Plan for Next Session:        Time In / Time Out:    Timed Code/Total Treatment Minutes:  34' (15' MT, 15' TE)  Insurance approved the followin              48 units  [id number]              48 units  [id number]              48 units  [id number]              48 units  [id number]              12 units    CPT Code Units today Running Total Units Total approved    TE [id number]  1 9 [place] [address]  1 9 48   Gait 20168      NR [id number]   48   TA  81202   48   Estim Unatt [address]      [place] [place] [id number]      [facility] [address]    12   ADL/Self care 14059      DNT 1-2 33244      DNT 3-4       Other:                 Total for episode of care            Date range: 10/27/2021 thru 2022     Next Progress Note due:        Plan of Care Interventions:  [x] Therapeutic Exercise  [] Modalities:  [x] Therapeutic Activity     [] Ultrasound  [] Estim  [] Gait Training      [] Cervical Traction [] Lumbar Traction  [x] Neuromuscular Re-education    [x] Cold/hotpack [] Iontophoresis   [x] Instruction in HEP      [] Vasopneumatic   [] Dry Needling    [x] Manual Therapy               [] Aquatic Therapy              Electronically signed by:  NAHED Cardenas PT    12/14/2021, 9:45 AM If you are a smoker, it is important for your health to stop smoking. Please be aware that second hand smoke is also harmful.

## 2023-04-20 NOTE — BH DISCHARGE NOTE NURSING/SOCIAL WORK/PSYCH REHAB - NSDCPEWEB_GEN_ALL_CORE
[facility] for Tobacco Control website --- [email or web address] website --- [email or web address]

## 2023-04-20 NOTE — BH INPATIENT PSYCHIATRY PROGRESS NOTE - NSBHFUPINTERVALHXFT_PSY_A_CORE
Patient seen and interviewed in room. Patient received PRN Benadryl 50mg and Haldol 5mg last night at 20:13 for agitation and insomnia. No other overnight concerns. Patient discussed in treatment team, discussed treatment goals, clinical progress, and medication changes.    Patient reports sleeping and eating okay. States his mood today is "good". He was given PRN Benadryl and Haldol last night for agitation and insomnia. Discussed reason and patient shared some increased anxiety/stress in setting of impending discharge. Due to delay in COVID testing patient will be discharged tomorrow 4/21. Adapted well to change in plans and expressed understanding. Patient was calm and cooperative throughout the interview. Patient had AM labs drawn and will have COVID testing done today.    Patient remains compliant with medications and denies SE. Pt encouraged to continue taking medication, engaging in groups, and taking care of ADLs to best of ability. Denies SI/HI. Denies AH/VH. VSS.

## 2023-04-20 NOTE — BH INPATIENT PSYCHIATRY PROGRESS NOTE - MSE UNSTRUCTURED FT
JOHAN/BEH: Adult male appears tired; dressed in shirt and pants; agitated, pacing; poor eye contact.   SPEECH: Slow rate, regular tone and volume. One to two word answers.  MOTOR: No PMR/PMA; no other abnormal movements.   MOOD: “Fine”.   AFFECT: Dysthymic and mood congruent; restricted range; stable.   THOUGHT PROCESS: Linear, vague, and under-inclusive.   THOUGHT CONTENT: Denies passive or active SI or HI; no evidence of delusions, possible paranoia, focused on presenting symptoms.   PERCEPTION: Denies AVH. Occasionally responding to internal stimuli (laughing and talking to self).   COGNITION: Grossly intact.   INSIGHT: Fair.   JUDGMENT: Fair.  IMPULSE CONTROL: Limited.   JOHAN/BEH: Adult male appears tired; dressed in shirt and pants; calm; poor eye contact.   SPEECH: Slow rate, regular tone and volume. One to two word answers.  MOTOR: No PMR/PMA; no other abnormal movements.   MOOD: “good”.   AFFECT: Dysthymic and mood congruent; restricted range; stable.   THOUGHT PROCESS: Linear, vague, and under-inclusive.   THOUGHT CONTENT: Denies passive or active SI or HI; no evidence of delusions, possible paranoia, focused on presenting symptoms.   PERCEPTION: Denies AVH. Occasionally responding to internal stimuli (laughing and talking to self).   COGNITION: Grossly intact.   INSIGHT: Fair.   JUDGMENT: Fair.  IMPULSE CONTROL: Limited.

## 2023-04-20 NOTE — BH DISCHARGE NOTE NURSING/SOCIAL WORK/PSYCH REHAB - NSDCPEEMAIL_GEN_ALL_CORE
[facility] for Tobacco Control email [email or web address]

## 2023-04-20 NOTE — BH DISCHARGE NOTE NURSING/SOCIAL WORK/PSYCH REHAB - DISCHARGE INSTRUCTIONS AFTERCARE APPOINTMENTS
In order to check the location, date, or time of your aftercare appointment, please refer to your Discharge Instructions Document given to you upon leaving the hospital.  If you have lost the instructions please call [phone]

## 2023-04-20 NOTE — BH INPATIENT PSYCHIATRY PROGRESS NOTE - NSBHCHARTREVIEWVS_PSY_A_CORE FT
Vital Signs Last 24 Hrs  T(C): 36.5 (04-20-23 @ 06:31), Max: 37 (04-19-23 @ 19:28)  T(F): 97.7 (04-20-23 @ 06:31), Max: 98.6 (04-19-23 @ 19:28)  HR: --  BP: --  BP(mean): --  RR: --  SpO2: --    Orthostatic VS  04-19-23 @ 19:28  Lying BP: --/-- HR: --  Sitting BP: 124/74 HR: 80  Standing BP: 125/80 HR: 97  Site: --  Mode: --  Orthostatic VS  04-18-23 @ 19:18  Lying BP: --/-- HR: --  Sitting BP: 124/67 HR: 78  Standing BP: 120/71 HR: 79  Site: --  Mode: --  Orthostatic VS  04-18-23 @ 08:11  Lying BP: --/-- HR: --  Sitting BP: 104/62 HR: 60  Standing BP: 110/74 HR: 72  Site: upper left arm  Mode: electronic   Vital Signs Last 24 Hrs  T(C): 36.5 (04-20-23 @ 06:31), Max: 37 (04-19-23 @ 19:28)  T(F): 97.7 (04-20-23 @ 06:31), Max: 98.6 (04-19-23 @ 19:28)  HR: --  BP: --  BP(mean): --  RR: --  SpO2: --    Orthostatic VS  04-20-23 @ 08:25  Lying BP: --/-- HR: --  Sitting BP: 104/66 HR: 66  Standing BP: 112/70 HR: 71  Site: --  Mode: --  Orthostatic VS  04-19-23 @ 19:28  Lying BP: --/-- HR: --  Sitting BP: 124/74 HR: 80  Standing BP: 125/80 HR: 97  Site: --  Mode: --  Orthostatic VS  04-18-23 @ 19:18  Lying BP: --/-- HR: --  Sitting BP: 124/67 HR: 78  Standing BP: 120/71 HR: 79  Site: --  Mode: --   Vital Signs Last 24 Hrs  T(C): 36.6 (04-21-23 @ 06:01), Max: 36.6 (04-21-23 @ 06:01)  T(F): 97.8 (04-21-23 @ 06:01), Max: 97.8 (04-21-23 @ 06:01)  HR: --  BP: --  BP(mean): --  RR: --  SpO2: --    Orthostatic VS  04-21-23 @ 08:10  Lying BP: --/-- HR: --  Sitting BP: 117/94 HR: 80  Standing BP: 114/65 HR: 97  Site: --  Mode: --  Orthostatic VS  04-20-23 @ 19:28  Lying BP: --/-- HR: --  Sitting BP: 130/75 HR: 77  Standing BP: 123/78 HR: 85  Site: --  Mode: --  Orthostatic VS  04-20-23 @ 08:25  Lying BP: --/-- HR: --  Sitting BP: 104/66 HR: 66  Standing BP: 112/70 HR: 71  Site: --  Mode: --

## 2023-04-20 NOTE — BH DISCHARGE NOTE NURSING/SOCIAL WORK/PSYCH REHAB - NSDCPRGOAL_PSY_ALL_CORE
Writer met with patient in order to discuss progress towards goal upon discharge. Pt has made fair progress towards goal, as patient has identified reading and listening to music as positive coping skill. Writer provided support and encouraged patient to continue utilizing coping skill post discharge. Pt was receptive.     On the unit, patient was visible, interacting minimally with peers. Patient was initially irritable and presented with poor insight, however upon discharge, Patient is better able to maintain behavioral control. Patient reports being hopeful in regards to discharge and treatment. Writer provided support. Patient and writer completed safety planning form. Patient denies SI     Patient did not attend any psychiatric rehabilitation groups during current hospitalization despite daily prompting and encouragement from staff. Patient refused press ganey survey.

## 2023-04-20 NOTE — BH DISCHARGE NOTE NURSING/SOCIAL WORK/PSYCH REHAB - NSCDUDCCRISIS_PSY_A_CORE
formerly [facility]  [phone]) formerly [facility]-WELL ([phone])  Text "WELL" to [phone]  Website: www.[employer]/.[employer] 1 (989) 621-EPZS (9299) Website: www.safehorizon.org/.National Suicide Prevention Lifeline 7 (522) 078-0769/.  [employer]  1 (432) LIFENET (737-2867)/.  [facility]  [phone] [place] [address] [phone]   Hours:  Monday through Friday from 9 AM to 3 PM/988 Suicide and Crisis Lifeline

## 2023-04-21 VITALS — TEMPERATURE: 98 F

## 2023-04-21 RX ADMIN — RISPERIDONE 1 MILLIGRAM(S): 4 TABLET ORAL at 10:22

## 2023-04-21 NOTE — BH INPATIENT PSYCHIATRY PROGRESS NOTE - NSTXDCHOUSPROGRES_PSY_ALL_CORE
No Change
Improving
No Change
abdomen/location

## 2023-04-21 NOTE — BH INPATIENT PSYCHIATRY PROGRESS NOTE - MSE UNSTRUCTURED FT
JOHAN/BEH: Adult male appears tired; dressed in shirt and pants; calm; poor eye contact.   SPEECH: Slow rate, regular tone and volume. One to two word answers.  MOTOR: No PMR/PMA; no other abnormal movements.   MOOD: “good”.   AFFECT: Dysthymic and mood congruent; restricted range; stable.   THOUGHT PROCESS: Linear, vague, and under-inclusive.   THOUGHT CONTENT: Denies passive or active SI or HI; no evidence of delusions, possible paranoia, focused on presenting symptoms.   PERCEPTION: Denies AVH. Occasionally responding to internal stimuli (laughing and talking to self).   COGNITION: Grossly intact.   INSIGHT: Fair.   JUDGMENT: Fair.  IMPULSE CONTROL: Limited.

## 2023-04-21 NOTE — BH INPATIENT PSYCHIATRY PROGRESS NOTE - ATTENDING COMMENTS
JOHAN supervised student and independently performed history, exam and medical decision making.     -----    Attending: "I independently performed the documented medical decision making" indicates that I discussed with the treatment team and reviewed and revised treatment plan including medications with JOHAN based on history and exam performed by JOHAN.
NP Solages supervised student and independently performed history, exam and medical decision making.     -----    Attending Earline: "I independently performed the documented medical decision making" indicates that I discussed with the treatment team and reviewed and revised treatment plan including medications with JOHAN based on history and exam performed by JOHAN.
JOHAN supervised student and independently performed history, exam and medical decision making.     -----    Attending: "I independently performed the documented medical decision making" indicates that I discussed with the treatment team and reviewed and revised treatment plan including medications with JOHAN based on history and exam performed by JOHAN.
NP Solages supervised student and independently performed history, exam and medical decision making.     -----    Attending Earline: "I independently performed the documented medical decision making" indicates that I discussed with the treatment team and reviewed and revised treatment plan including medications with JOHAN based on history and exam performed by JOHAN.
JOHAN supervised student and independently performed history, exam and medical decision making.     -----    Attending: "I independently performed the documented medical decision making" indicates that I discussed with the treatment team and reviewed and revised treatment plan including medications with JOHAN based on history and exam performed by JOHAN.
JOHAN supervised student and independently performed history, exam and medical decision making.     -----    Attending: "I independently performed the documented medical decision making" indicates that I discussed with the treatment team and reviewed and revised treatment plan including medications with JOHAN based on history and exam performed by JOHAN.
NP Solages supervised student and independently performed history, exam and medical decision making.     -----    Attending Earline: "I independently performed the documented medical decision making" indicates that I discussed with the treatment team and reviewed and revised treatment plan including medications with JOHAN based on history and exam performed by JOHAN.
Presentation consistent with acute psychotic decompensation in a schizophrenia spectrum condition, exacerbated by substance use, r/o PTSD, less clear evidence supportive of personality disorder, poor insight and judgment, now denying AH, remains significantly disorganized, with internal preoccupation, agree with plan for risperidone titration with goal of MEDINA, consider possible Vivitrol vs gabapentin trial for etoh use. 
JOHAN supervised student and independently performed history, exam and medical decision making.     -----    Attending: "I independently performed the documented medical decision making" indicates that I discussed with the treatment team and reviewed and revised treatment plan including medications with JOHAN based on history and exam performed by JOHAN.

## 2023-04-21 NOTE — BH INPATIENT PSYCHIATRY PROGRESS NOTE - NSBHASSESSSUMMFT_PSY_ALL_CORE
Patient is a 32-year-old male; unclear current social hx, hx of homelessness; PPHx schizophrenia, per PSYCKES of various dx (mood, psychotic, anxiety, adjustment, substance disorders), prior admissions, denies hx of suicidality or violence; no known PMHx; BIB EMS; psychiatry consulted for evaluation; transferred to [place] [address] for ongoing psychiatric care. Working diagnosis of schizophrenia spectrum illness per history including schizophrenia, schizoaffective disorder, substance-induced mood/psychotic disorder; R/O personality disorder. Of note, pt with multiple other MRNs in [place] and 3 ED visits in 24 hours. Assessment limited as pt is a limited historian and minimally engaged.  Pt with hx of multiple prior inpatient admissions but also hx of malingering for food/shelter. No known recent substance use, utox negative and blood alcohol negative. Continued inpatient admission required for safety, stabilization, medication optimization, safe discharge planning.    Patient remains compliant with standing medications. Risperidone titrated to 1mg AM, 4mg qHS. Patient refused MEDINA. Depakote level therapeutic on 4/14. Currently on 2000mg qHS since 4/18. Required PRNs for agitation/insomnia at night 4/19. Discharge to shelter planned for 4/20 delayed due to delay in COVID testing. Rescheduled for tomorrow 4/21. Patient adapted well to this change and was understanding.  Pt denies SI/HI, denies AVH.  Has been in fair behavioral control.  Although symptoms have attenuated, symptoms of internal preoccupation are still present. f/u CBC, CMP, and COVID PCR prior to d/c.    Plan  1. Legal: Admitted on 9.27 status  2. Safety: No reported SI/SIB/HI/VI currently on unit; continue routine observation.   	- Psychotropic PRN medications for safety  3. Psychiatric:   	- Risperidone 1mg daily and 4 mg qHS for psychosis; plan to titrate as tolerated; R/B/A and side effects discussed  	- Depakote ER 2000mg qHS, VPA level 86.20 on 4/14  	- PRN melatonin 3 mg qHS for insomnia  4. Therapy: group & milieu therapy as appropriate  5. Medical: No acute medical needs identified  	- CBC & CMP WNL, TSH 0.63, utox negative, blood alcohol negative  	- f/u CBC, CMP, COVID swab from 4/20  6. Collateral: ongoing  7. Disposition: ongoing, planned for D/C to shelter 4/21 pending COVID testing Patient is a 32-year-old male; unclear current social hx, hx of homelessness; PPHx schizophrenia, per PSYCKES of various dx (mood, psychotic, anxiety, adjustment, substance disorders), prior admissions, denies hx of suicidality or violence; no known PMHx; BIB EMS; psychiatry consulted for evaluation; transferred to [place] [address] for ongoing psychiatric care. Working diagnosis of schizophrenia spectrum illness per history including schizophrenia, schizoaffective disorder, substance-induced mood/psychotic disorder; R/O personality disorder. Of note, pt with multiple other MRNs in [place] and 3 ED visits in 24 hours. Assessment limited as pt is a limited historian and minimally engaged.  Pt with hx of multiple prior inpatient admissions but also hx of malingering for food/shelter. No known recent substance use, utox negative and blood alcohol negative. Continued inpatient admission required for safety, stabilization, medication optimization, safe discharge planning.    Patient remains compliant with standing medications. Risperidone titrated to 1mg AM, 4mg qHS. Patient refused MEDINA. Depakote level therapeutic on 4/14. Currently on 2000mg qHS since 4/18. Has been in fair behavioral control.  Although symptoms have attenuated, symptoms of internal preoccupation are still present occasionally. Discharge to shelter planned for today. Today patient denies SI/HI, denies AVH. Engaged in Safety Plan. Discharge planned for shelter. Discharge labs unremarkable - COVID PCR negative, CMP wnl, CBC with slightly low neutrophil count (1.38) in absence of concerning clinical findings (afebrile, rest of CBC unremarkable).    Plan  1. Legal: Admitted on 9.27 status  2. Safety: No reported SI/SIB/HI/VI currently on unit; continue routine observation.   	- Psychotropic PRN medications for safety  3. Psychiatric:   	- Risperidone 1mg daily and 4 mg qHS for psychosis -- D/C on 5mg QD  	- VPA level 86.20 on 4/14 -- D/C on Depakote ER 2000mg qHS  4. Therapy: group & milieu therapy as appropriate  5. Medical: No acute medical needs identified  	- CBC & CMP WNL, TSH 0.63, utox negative, blood alcohol negative  	- Discharge labs (4/20): COVID negative, CMP wnl, CBC with low neutrophil count 1.38  6. Collateral: ongoing  7. Disposition: D/C to shelter 4/21

## 2023-04-21 NOTE — BH INPATIENT PSYCHIATRY PROGRESS NOTE - NSBHCHARTREVIEWVS_PSY_A_CORE FT
Vital Signs Last 24 Hrs  T(C): 36.6 (04-21-23 @ 06:01), Max: 36.6 (04-20-23 @ 19:28)  T(F): 97.8 (04-21-23 @ 06:01), Max: 97.9 (04-20-23 @ 19:28)  HR: --  BP: --  BP(mean): --  RR: --  SpO2: --    Orthostatic VS  04-20-23 @ 19:28  Lying BP: --/-- HR: --  Sitting BP: 130/75 HR: 77  Standing BP: 123/78 HR: 85  Site: --  Mode: --  Orthostatic VS  04-20-23 @ 08:25  Lying BP: --/-- HR: --  Sitting BP: 104/66 HR: 66  Standing BP: 112/70 HR: 71  Site: --  Mode: --  Orthostatic VS  04-19-23 @ 19:28  Lying BP: --/-- HR: --  Sitting BP: 124/74 HR: 80  Standing BP: 125/80 HR: 97  Site: --  Mode: --   Vital Signs Last 24 Hrs  T(C): 36.6 (04-21-23 @ 06:01), Max: 36.6 (04-20-23 @ 19:28)  T(F): 97.8 (04-21-23 @ 06:01), Max: 97.9 (04-20-23 @ 19:28)  HR: --  BP: --  BP(mean): --  RR: --  SpO2: --    Orthostatic VS  04-21-23 @ 08:10  Lying BP: --/-- HR: --  Sitting BP: 117/94 HR: 80  Standing BP: 114/65 HR: 97  Site: --  Mode: --  Orthostatic VS  04-20-23 @ 19:28  Lying BP: --/-- HR: --  Sitting BP: 130/75 HR: 77  Standing BP: 123/78 HR: 85  Site: --  Mode: --  Orthostatic VS  04-20-23 @ 08:25  Lying BP: --/-- HR: --  Sitting BP: 104/66 HR: 66  Standing BP: 112/70 HR: 71  Site: --  Mode: --  Orthostatic VS  04-19-23 @ 19:28  Lying BP: --/-- HR: --  Sitting BP: 124/74 HR: 80  Standing BP: 125/80 HR: 97  Site: --  Mode: --   Vital Signs Last 24 Hrs  T(C): 36.6 (04-21-23 @ 06:01), Max: 36.6 (04-21-23 @ 06:01)  T(F): 97.8 (04-21-23 @ 06:01), Max: 97.8 (04-21-23 @ 06:01)  HR: --  BP: --  BP(mean): --  RR: --  SpO2: --    Orthostatic VS  04-21-23 @ 08:10  Lying BP: --/-- HR: --  Sitting BP: 117/94 HR: 80  Standing BP: 114/65 HR: 97  Site: --  Mode: --  Orthostatic VS  04-20-23 @ 19:28  Lying BP: --/-- HR: --  Sitting BP: 130/75 HR: 77  Standing BP: 123/78 HR: 85  Site: --  Mode: --  Orthostatic VS  04-20-23 @ 08:25  Lying BP: --/-- HR: --  Sitting BP: 104/66 HR: 66  Standing BP: 112/70 HR: 71  Site: --  Mode: --

## 2023-04-21 NOTE — BH INPATIENT PSYCHIATRY PROGRESS NOTE - MSE OPTIONS
Unstructured MSE

## 2023-04-21 NOTE — BH INPATIENT PSYCHIATRY PROGRESS NOTE - NSTXCOPEDATETRGT_PSY_ALL_CORE
19-Apr-2023
19-Apr-2023
12-Apr-2023
19-Apr-2023
19-Apr-2023
12-Apr-2023
12-Apr-2023
19-Apr-2023
12-Apr-2023
19-Apr-2023
12-Apr-2023

## 2023-04-21 NOTE — BH INPATIENT PSYCHIATRY PROGRESS NOTE - NSBHCHARTREVIEWLAB_PSY_A_CORE FT
Labs reviewed no acute findings
Labs reviewed no acute findings  Covid negative at dc  
Labs reviewed no acute findings

## 2023-04-21 NOTE — BH INPATIENT PSYCHIATRY PROGRESS NOTE - NSTXDISORGDATEEST_PSY_ALL_CORE
01-Apr-2023
05-Apr-2023
01-Apr-2023
05-Apr-2023
12-Apr-2023
05-Apr-2023
01-Apr-2023
12-Apr-2023
01-Apr-2023
12-Apr-2023

## 2023-04-21 NOTE — BH INPATIENT PSYCHIATRY PROGRESS NOTE - NSTXDISORGPROGRES_PSY_ALL_CORE
Improving
Improving
No Change
Improving
No Change
Improving
Met - goal discontinued
No Change
No Change

## 2023-04-21 NOTE — BH INPATIENT PSYCHIATRY PROGRESS NOTE - NSICDXBHSECONDARYDX_PSY_ALL_CORE
Substance abuse   F19.10  

## 2023-04-21 NOTE — BH INPATIENT PSYCHIATRY PROGRESS NOTE - NSTXDCHOUSDATETRGT_PSY_ALL_CORE
10-Apr-2023
17-Apr-2023
10-Apr-2023
10-Apr-2023
17-Apr-2023
17-Apr-2023
10-Apr-2023
27-Apr-2023
17-Apr-2023
10-Apr-2023
10-Apr-2023
17-Apr-2023

## 2023-04-21 NOTE — BH INPATIENT PSYCHIATRY PROGRESS NOTE - NSTXDCHOUSDATENEW_PSY_ALL_CORE
17-Apr-2023

## 2023-04-21 NOTE — BH INPATIENT PSYCHIATRY PROGRESS NOTE - NSTXPROBDCHOUS_PSY_ALL_CORE
DISCHARGE ISSUE - LACK OF APPROPRIATE HOUSING

## 2023-04-21 NOTE — BH INPATIENT PSYCHIATRY PROGRESS NOTE - NSBHADMITMEDEDUDETAILS_PSY_A_CORE FT
Discussed rationale for medication, for the treatment of psychotic symptoms: to  improve thinking, mood, and behavior.

## 2023-04-21 NOTE — BH INPATIENT PSYCHIATRY PROGRESS NOTE - NSICDXBHPRIMARYDX_PSY_ALL_CORE
Schizophrenia spectrum and other psychotic disorders   F29  

## 2023-04-21 NOTE — BH INPATIENT PSYCHIATRY PROGRESS NOTE - NSBHATTESTATTENDPERFORM_PSY_A_CORE
Medical Decision Making
History/Exam/Medical Decision Making
Medical Decision Making

## 2023-04-21 NOTE — BH INPATIENT PSYCHIATRY PROGRESS NOTE - NSCGISEVERILLNESS_PSY_ALL_CORE
5 = Markedly ill - intrusive symptoms that distinctly impair social/occupational function or cause intrusive levels of distress
3 = Mildly ill – clearly established symptoms with minimal, if any, distress or difficulty in social and occupational function

## 2023-04-21 NOTE — BH INPATIENT PSYCHIATRY PROGRESS NOTE - NSTXPSYCHODATETRGT_PSY_ALL_CORE
26-Apr-2023
19-Apr-2023
12-Apr-2023
19-Apr-2023
12-Apr-2023
19-Apr-2023
19-Apr-2023
26-Apr-2023
19-Apr-2023
12-Apr-2023
19-Apr-2023

## 2023-04-21 NOTE — BH INPATIENT PSYCHIATRY PROGRESS NOTE - NSTXDISORGGOAL_PSY_ALL_CORE
Will identify 2 coping skills that assist in organizing

## 2023-04-21 NOTE — BH INPATIENT PSYCHIATRY PROGRESS NOTE - NSBHATTESTATTENDBILLTIME_PSY_A_CORE
I independently performed the documented

## 2023-04-21 NOTE — BH INPATIENT PSYCHIATRY PROGRESS NOTE - NSBHFUPINTERVALHXFT_PSY_A_CORE
Patient met with treatment team this morning in conference room. No overnight concerns. Patient discussed in treatment team, discussed treatment goals, clinical progress, and medication changes.     Patient reports feeling good this morning and reflected on his inpatient course stating that he feels it went well and he felt comfortable and calm throughout the time here. Discharge medication regimen was discussed. Patient will be on risperidone 5mg QD and Depakote 2000 qHS. Patient encouraged to continue taking medications and expressed understanding of plan. He denies SI/HI and engaged in safety planning, specifically citing that he would call 211 and reach out to others for support if he ever had thoughts of harming himself or others, or if he ever needed help for any other reason.     Patient remained compliant with medications throughout stay and denies SE. Patient encouraged to continue taking medication and taking care of ADLs to best of ability. Denies AH/VH. VSS. Discharge planned for 11a to shelter ([address] in [place]).

## 2023-04-21 NOTE — BH INPATIENT PSYCHIATRY PROGRESS NOTE - NSTXDCHOUSDATEEST_PSY_ALL_CORE
03-Apr-2023
10-Apr-2023
20-Apr-2023
03-Apr-2023
10-Apr-2023
03-Apr-2023
10-Apr-2023
03-Apr-2023
10-Apr-2023

## 2023-04-21 NOTE — BH INPATIENT PSYCHIATRY PROGRESS NOTE - NSCGIIMPROVESX_PSY_ALL_CORE
4 = No change - symptoms remain essentially unchanged
3 = Minimally improved - slightly better with little or no clinically meaningful reduction of symptoms.  Represents very little change in basic clinical status, level of care, or functional capacity.
4 = No change - symptoms remain essentially unchanged

## 2023-04-21 NOTE — BH INPATIENT PSYCHIATRY PROGRESS NOTE - NSTXDISORGDATETRGT_PSY_ALL_CORE
19-Apr-2023
08-Apr-2023
15-Apr-2023
12-Apr-2023
22-Apr-2023
12-Apr-2023
12-Apr-2023
22-Apr-2023
19-Apr-2023
08-Apr-2023
22-Apr-2023
08-Apr-2023
15-Apr-2023
21-Apr-2023
19-Apr-2023
22-Apr-2023

## 2023-04-21 NOTE — BH INPATIENT PSYCHIATRY PROGRESS NOTE - NSTXCOPEDATEEST_PSY_ALL_CORE
12-Apr-2023
05-Apr-2023
05-Apr-2023
12-Apr-2023
12-Apr-2023
05-Apr-2023
12-Apr-2023
12-Apr-2023
05-Apr-2023
12-Apr-2023
05-Apr-2023
12-Apr-2023
12-Apr-2023

## 2023-04-21 NOTE — BH INPATIENT PSYCHIATRY PROGRESS NOTE - NSBHATTESTATTENDBILLTIME2_PSY_A_CORE
I have reviewed and verified the documentation.

## 2023-04-21 NOTE — BH INPATIENT PSYCHIATRY PROGRESS NOTE - NSTXDCHOUSGOAL_PSY_ALL_CORE
Will agree to participate in housing process

## 2023-04-21 NOTE — BH INPATIENT PSYCHIATRY PROGRESS NOTE - NSBHCONTPROVIDER_PSY_ALL_CORE
Not applicable

## 2023-04-21 NOTE — BH INPATIENT PSYCHIATRY PROGRESS NOTE - NSBHADMITMEDEDUDETAILS_A_CORE FT
medication education including but not limited to weight gain, sedation, pain at injection site, TD, NMS, EPS, N/V, GI upset, and metabolic changes; pt verbalized understanding and asked appropriate questions.    

## 2023-04-21 NOTE — BH INPATIENT PSYCHIATRY PROGRESS NOTE - NSTXPSYCHOGOAL_PSY_ALL_CORE
Will identify 1 thought/feeling/behavior associated with hallucinations
Will identify 1 thought/feeling/behavior associated with hallucinations
Will report using a mindfulness skill to be able to read 5 pages of a book daily despite hallucinations
Will identify 1 thought/feeling/behavior associated with hallucinations
Will report using a mindfulness skill to be able to read 5 pages of a book daily despite hallucinations
Will identify 1 thought/feeling/behavior associated with hallucinations

## 2023-04-21 NOTE — BH INPATIENT PSYCHIATRY PROGRESS NOTE - NSTXPSYCHOINTERMD_PSY_ALL_CORE
risperidone trial, group therapy  Risperdal 2mg qhs
risperidone trial, group therapy  Risperdal 4mg qhs, 1mg qd  symptom reduction  Consider MEDINA
risperidone trial, group therapy
risperidone trial, group therapy  Risperdal 2mg qhs
risperidone trial, group therapy  Risperdal 4mg qhs, 1mg qd  symptom reduction  Consider MEDINA
risperidone trial, group therapy  Risperdal 2mg qhs
risperidone trial, group therapy  Risperdal 4mg qhs, 1mg qd  symptom reduction  Consider MEDINA
risperidone trial, group therapy  Risperdal 4mg qhs, 1mg qd  symptom reduction  Consider MEDINA
risperidone trial, group therapy  Risperdal 2mg qhs
risperidone trial, group therapy  Risperdal 2mg qhs
risperidone trial, group therapy  Risperdal 4mg qhs, 1mg qd  symptom reduction  Consider MEDINA
risperidone trial, group therapy  Risperdal 4mg qhs, 1mg qd  symptom reduction  Consider MEDINA

## 2023-04-22 ENCOUNTER — EMERGENCY (EMERGENCY)
Facility: HOSPITAL | Age: 33
LOS: 1 days | Discharge: HOME | End: 2023-04-22
Admitting: EMERGENCY MEDICINE
Payer: MEDICAID

## 2023-04-22 ENCOUNTER — EMERGENCY (EMERGENCY)
Facility: HOSPITAL | Age: 33
LOS: 1 days | Discharge: HOME | End: 2023-04-22
Admitting: EMERGENCY MEDICINE
Payer: SELF-PAY

## 2023-04-22 VITALS
SYSTOLIC BLOOD PRESSURE: 102 MMHG | TEMPERATURE: 97 F | OXYGEN SATURATION: 96 % | DIASTOLIC BLOOD PRESSURE: 68 MMHG | HEART RATE: 90 BPM | RESPIRATION RATE: 16 BRPM | HEIGHT: 67 IN | WEIGHT: 179.9 LBS

## 2023-04-22 VITALS
HEART RATE: 85 BPM | DIASTOLIC BLOOD PRESSURE: 82 MMHG | OXYGEN SATURATION: 98 % | SYSTOLIC BLOOD PRESSURE: 133 MMHG | RESPIRATION RATE: 15 BRPM | TEMPERATURE: 99 F

## 2023-04-22 DIAGNOSIS — R53.83 OTHER FATIGUE: ICD-10-CM

## 2023-04-22 DIAGNOSIS — M79.18 MYALGIA, OTHER SITE: ICD-10-CM

## 2023-04-22 DIAGNOSIS — Z00.00 ENCOUNTER FOR GENERAL ADULT MEDICAL EXAMINATION WITHOUT ABNORMAL FINDINGS: ICD-10-CM

## 2023-04-22 DIAGNOSIS — Z59.00 HOMELESSNESS UNSPECIFIED: ICD-10-CM

## 2023-04-22 DIAGNOSIS — F17.200 NICOTINE DEPENDENCE, UNSPECIFIED, UNCOMPLICATED: ICD-10-CM

## 2023-04-22 PROCEDURE — 99053 MED SERV 10PM-8AM 24 HR FAC: CPT

## 2023-04-22 PROCEDURE — 99284 EMERGENCY DEPT VISIT MOD MDM: CPT

## 2023-04-22 RX ORDER — HYDROXYZINE HCL 10 MG
50 TABLET ORAL ONCE
Refills: 0 | Status: COMPLETED | OUTPATIENT
Start: 2023-04-22 | End: 2023-04-22

## 2023-04-22 RX ORDER — KETOROLAC TROMETHAMINE 30 MG/ML
30 SYRINGE (ML) INJECTION ONCE
Refills: 0 | Status: DISCONTINUED | OUTPATIENT
Start: 2023-04-22 | End: 2023-04-22

## 2023-04-22 RX ORDER — ACETAMINOPHEN 500 MG
650 TABLET ORAL ONCE
Refills: 0 | Status: COMPLETED | OUTPATIENT
Start: 2023-04-22 | End: 2023-04-22

## 2023-04-22 RX ADMIN — Medication 650 MILLIGRAM(S): at 19:40

## 2023-04-22 RX ADMIN — Medication 50 MILLIGRAM(S): at 00:42

## 2023-04-22 RX ADMIN — Medication 30 MILLIGRAM(S): at 19:40

## 2023-04-22 RX ADMIN — Medication 500 MILLIGRAM(S): at 00:42

## 2023-04-22 SDOH — ECONOMIC STABILITY - HOUSING INSECURITY: HOMELESSNESS UNSPECIFIED: Z59.00

## 2023-04-22 NOTE — ED PROVIDER NOTE - CLINICAL SUMMARY MEDICAL DECISION MAKING FREE TEXT BOX
medical screening exam has been performed.  Pt with no acute trauma or emergencies noted and exam wnl.  Was just dc'd from another hospital for similar cx, uncooperative and refused to elaborate on arrival, repeatedly asking for room and stretcher and refused VS and exam by RN and provider, tx team at bedside and screening performed, not forthcoming with drug/alcohol use, offered pain med and medically stable for dc

## 2023-04-22 NOTE — ED ADULT TRIAGE NOTE - NS ED TRIAGE AVPU SCALE
Consent: The patient's consent was obtained including but not limited to risks of crusting, scabbing, blistering, scarring, darker or lighter pigmentary change, recurrence, incomplete removal and infection. Render Post-Care Instructions In Note?: no Alert-The patient is alert, awake and responds to voice. The patient is oriented to time, place, and person. The triage nurse is able to obtain subjective information. Duration Of Freeze Thaw-Cycle (Seconds): 10 Detail Level: Detailed Number Of Freeze-Thaw Cycles: 1 freeze-thaw cycle Post-Care Instructions: I reviewed with the patient in detail post-care instructions. Patient is to wear sunprotection, and avoid picking at any of the treated lesions. Pt may apply Vaseline to crusted or scabbing areas.

## 2023-04-22 NOTE — ED ADULT TRIAGE NOTE - CHIEF COMPLAINT QUOTE
Pt brought in by EMS for complaint of general body aches since this morning. Pt unsure of fever and chills.

## 2023-04-22 NOTE — ED PROVIDER NOTE - NSFOLLOWUPINSTRUCTIONS_ED_ALL_ED_FT
Follow up with your primary care doctor or clinics listed below if you do not have a doctor,    [address] [address]  To make an appointment, call [phone]    [facility]  Address: [facility] [address] [place] [id number]  Appointment Center: [phone] ([phone])     [facility] is a good referral line for crisis and substance abuse help.  [facility] has drop in programs all over the city.    Return to the ER for Emergencies.  Return immediately for any new or worsening symptoms or any new concerns

## 2023-04-22 NOTE — ED PROVIDER NOTE - CLINICAL SUMMARY MEDICAL DECISION MAKING FREE TEXT BOX
Patient here with fatigue -no other complaints. Appears well NAD stable. Undomiciled, poor hygiene ,   poor historian    Exam: NAD stable. wnl    Plan: d/c with return precautions

## 2023-04-22 NOTE — ED PROVIDER NOTE - CARE PROVIDER_API CALL
Ebony Chandra; MPH)  Internal Medicine  [address] [place] [address]  Phone: [phone]  Fax: [phone]  Follow Up Time:

## 2023-04-22 NOTE — ED PROVIDER NOTE - OBJECTIVE STATEMENT
31 y/o male denies hx now here c/o fatigue. No obvious complaints. undomiciled. Appears well NAD. Denies fever, chills, hematuria,abdominal pain, change in bowel function, flank pain, rash, HA, dizziness, SOB, CP, palpitations, diaphoresis, cough, and malaise.

## 2023-04-22 NOTE — ED PROVIDER NOTE - PATIENT PORTAL LINK FT
You can access the FollowMyHealth Patient Portal offered by [facility] by registering at the following website: [email or web address]. By joining [email or web address] [email or web address] portal, you will also be able to view your health information using other applications (apps) compatible with our system.

## 2023-04-22 NOTE — ED PROVIDER NOTE - PHYSICAL EXAMINATION
Gen - unkempt M, NAD, speaking in full sentences  Skin - no acute rash, intact   HEENT - AT/NC, no conjunctival injection or dc, neck supple and FROM, airway patent   MSK - w/w/p, full ROM of peripheral joints, no midline tenderness   Psych - euphoria, normal speech and avoids eye contact  Neuro - AxOx3, ambulatory with steady gait

## 2023-04-22 NOTE — ED PROVIDER NOTE - PATIENT PORTAL LINK FT
You can access the FollowMyHealth Patient Portal offered by [facility] by registering at the following website: [email or web address]. By joining [employer]’s FollowMyHealth portal, you will also be able to view your health information using other applications (apps) compatible with our system.

## 2023-04-22 NOTE — ED PROVIDER NOTE - ENMT NEGATIVE STATEMENT, MLM
0 = swallows foods/liquids without difficulty Ears: no ear pain and no hearing problems. Nose: no nasal congestion and no nasal drainage. Mouth/Throat: no dysphagia, no hoarseness and no throat pain. Neck: no lumps, no pain, no stiffness and no swollen glands.

## 2023-04-22 NOTE — ED PROVIDER NOTE - OBJECTIVE STATEMENT
33 yo M with no known PMHx, undomiciled, BIBA for generalized body aches. Per EMS, pt was just at another hospital, s/p w/u and labs with unremarkable findings, d/c'd and called ambulance again for generalized body aches. Uncooperative on arrival, refused to elaborate on history and repeatedly asking for stretcher to lay down and states "I don't feel well and I need to lay down." No apparent fever, chills, respiratory distress, wound, bleeding, N/V/D, focal weakness, or rash noted. Not forthcoming with drug or alcohol use

## 2023-04-24 PROBLEM — Z78.9 OTHER SPECIFIED HEALTH STATUS: Chronic | Status: ACTIVE | Noted: 2023-03-31

## 2023-05-10 ENCOUNTER — EMERGENCY (EMERGENCY)
Facility: HOSPITAL | Age: 33
LOS: 1 days | Discharge: HOME | End: 2023-05-10
Admitting: EMERGENCY MEDICINE
Payer: SELF-PAY

## 2023-05-10 VITALS
RESPIRATION RATE: 18 BRPM | HEART RATE: 79 BPM | TEMPERATURE: 98 F | SYSTOLIC BLOOD PRESSURE: 96 MMHG | HEIGHT: 66 IN | WEIGHT: 179.9 LBS | OXYGEN SATURATION: 98 % | DIASTOLIC BLOOD PRESSURE: 63 MMHG

## 2023-05-10 DIAGNOSIS — Z59.00 HOMELESSNESS UNSPECIFIED: ICD-10-CM

## 2023-05-10 DIAGNOSIS — M79.10 MYALGIA, UNSPECIFIED SITE: ICD-10-CM

## 2023-05-10 PROBLEM — Z78.9 OTHER SPECIFIED HEALTH STATUS: Chronic | Status: ACTIVE | Noted: 2023-04-22

## 2023-05-10 PROCEDURE — 99283 EMERGENCY DEPT VISIT LOW MDM: CPT

## 2023-05-10 RX ORDER — ACETAMINOPHEN 500 MG
975 TABLET ORAL ONCE
Refills: 0 | Status: COMPLETED | OUTPATIENT
Start: 2023-05-10 | End: 2023-05-10

## 2023-05-10 RX ADMIN — Medication 975 MILLIGRAM(S): at 10:57

## 2023-05-10 SDOH — ECONOMIC STABILITY - HOUSING INSECURITY: HOMELESSNESS UNSPECIFIED: Z59.00

## 2023-05-10 NOTE — ED PROVIDER NOTE - OBJECTIVE STATEMENT
31 yo male undomiciled, denies sig pmhx, BIBEMS for evaluation. patient reports he is tired and would like a bed to rest. also c/o generalized body pain. he is wearing several hospital bands, upon asking about those visits, patient would not elaborate. he would like something to eat as well.

## 2023-05-10 NOTE — ED ADULT NURSE NOTE - NSFALLUNIVINTERV_ED_ALL_ED
Bed/Stretcher in lowest position, wheels locked, appropriate side rails in place/Call bell, personal items and telephone in reach/Instruct patient to call for assistance before getting out of bed/chair/stretcher/Non-slip footwear applied when patient is off stretcher/[place] to call system/Physically safe environment - no spills, clutter or unnecessary equipment/Purposeful proactive rounding/Room/bathroom lighting operational, light cord in reach

## 2023-05-10 NOTE — ED ADULT TRIAGE NOTE - CHIEF COMPLAINT QUOTE
BIBA c/o generalized body pain. was seen at a different ER with today's visit, reports nothing was done for him. appears comfortable, resting in chair

## 2023-05-10 NOTE — ED PROVIDER NOTE - PATIENT PORTAL LINK FT
You can access the FollowMyHealth Patient Portal offered by [facility] by registering at the following website: [email or web address]. By joining [employer]’s FollowMyHealth portal, you will also be able to view your health information using other applications (apps) compatible with our system.

## 2023-05-10 NOTE — ED PROVIDER NOTE - NSFOLLOWUPINSTRUCTIONS_ED_ALL_ED_FT
Follow up with your primary care doctor or clinics listed below if you do not have a doctor  [address] [address]  To make an appointment, call [phone]  [facility]  Address: [address] [place] [address]  Appointment Center: [phone] ([phone])    Return for any concerns

## 2023-05-10 NOTE — ED PROVIDER NOTE - PHYSICAL EXAMINATION
CONSTITUTIONAL: Well-appearing; well-nourished; in no apparent distress. disheveled.   	HEAD: Normocephalic; atraumatic.   	EYES:  conjunctiva and sclera clear  	ENT: normal nose; no rhinorrhea; normal pharynx with no erythema or lesions.   	NECK: Supple; non-tender;   	CARDIOVASCULAR: Normal S1, S2; no murmurs, rubs, or gallops. Regular rate and rhythm.   	RESPIRATORY: Breathing easily; breath sounds clear and equal bilaterally; no wheezes, rhonchi, or rales.  	GI: Soft; non-distended; non-tender  	EXT: STERN x 4.   	SKIN: Normal for age and race; warm; dry; good turgor; no apparent lesions or rash.   	NEURO: A & O x 3; face symmetric; grossly unremarkable.   PSYCHOLOGICAL: The patient’s mood and manner are appropriate.

## 2023-05-23 NOTE — SOCIAL WORK POST DISCHARGE FOLLOW UP NOTE - NSBHSWFOLLOWUP_PSY_ALL_CORE_FT
SW contacted pt regarding his missed appt asking if he needed assistance with rescheduling, however pt declined services at this time, despite encouragement and psychoeducation. Pt was assessed by the IDT team as stable at time of discharge. Case closed.

## 2023-05-30 ENCOUNTER — EMERGENCY (EMERGENCY)
Facility: HOSPITAL | Age: 33
LOS: 1 days | Discharge: LEFT BEFORE DISCHARGE | End: 2023-05-30
Attending: EMERGENCY MEDICINE | Admitting: EMERGENCY MEDICINE
Payer: SELF-PAY

## 2023-05-30 ENCOUNTER — EMERGENCY (EMERGENCY)
Facility: HOSPITAL | Age: 33
LOS: 1 days | Discharge: HOME | End: 2023-05-30
Admitting: EMERGENCY MEDICINE
Payer: MEDICAID

## 2023-05-30 VITALS — HEIGHT: 66 IN

## 2023-05-30 DIAGNOSIS — Z00.00 ENCOUNTER FOR GENERAL ADULT MEDICAL EXAMINATION WITHOUT ABNORMAL FINDINGS: ICD-10-CM

## 2023-05-30 DIAGNOSIS — Z53.21 PROCEDURE AND TREATMENT NOT CARRIED OUT DUE TO PATIENT LEAVING PRIOR TO BEING SEEN BY HEALTH CARE PROVIDER: ICD-10-CM

## 2023-05-30 DIAGNOSIS — R52 PAIN, UNSPECIFIED: ICD-10-CM

## 2023-05-30 DIAGNOSIS — Z59.00 HOMELESSNESS UNSPECIFIED: ICD-10-CM

## 2023-05-30 PROCEDURE — L9991: CPT

## 2023-05-30 PROCEDURE — 99283 EMERGENCY DEPT VISIT LOW MDM: CPT

## 2023-05-30 RX ORDER — ACETAMINOPHEN 500 MG
650 TABLET ORAL ONCE
Refills: 0 | Status: COMPLETED | OUTPATIENT
Start: 2023-05-30 | End: 2023-05-30

## 2023-05-30 RX ADMIN — Medication 650 MILLIGRAM(S): at 20:40

## 2023-05-30 SDOH — ECONOMIC STABILITY - HOUSING INSECURITY: HOMELESSNESS UNSPECIFIED: Z59.00

## 2023-05-30 NOTE — ED PROVIDER NOTE - CLINICAL SUMMARY MEDICAL DECISION MAKING FREE TEXT BOX
32-year-old male, unknown past medical history, undomiciled, presents to the emergency room complaining of generalized body pain and requesting food to eat. Patient is refusing vitals in triage.  He does not elaborate on any specific medical complaints, however is requesting food and pain medication.  Will give patient sandwich as well as some Tylenol.  Patient will be directed to follow-up with the [facility] that have free care for undomiciled patients.  Patient stable as he leaves.
Patient re-assessed.  Feels much better.  HR 90reg.  CXR results reviewed.  Noted leukocytosis.  Lung exam clear, but given finding on CXR, will prescribe antibiotic.  Stable for outpatient treatment.  Discussed with patient and grandmother.  Understands need for f/u and high risk indications to [facility].

## 2023-05-30 NOTE — ED ADULT TRIAGE NOTE - CHIEF COMPLAINT QUOTE
pt. with no medical complaints, requesting a place to sit. Pt. was just discharged and called 911 to return. Pt. refused vitals in triage.

## 2023-05-30 NOTE — ED PROVIDER NOTE - NSFOLLOWUPINSTRUCTIONS_ED_ALL_ED_FT
[address] [address]  [phone]     Medical Screening Exam    A medical screening exam (MSE) helps to determine whether you need immediate medical treatment relating to any number of symptoms you are having. This type of exam may be done in an emergency department, an urgent care setting, or your health care provider's office.    Depending on your symptoms and severity, you may need additional tests or medical therapy.    It is important to note that an MSE does not necessarily mean that you will need or receive further medical testing or interventions if your symptoms are not deemed to be medically urgent (emergent).    Tell a health care provider about:  Any allergies you have.  All medicines you are taking, including vitamins, herbs, eye drops, creams, and over-the-counter medicines.  Any problems you or family members have had with anesthetic medicines.  Any bleeding problems you have.  Any surgeries you have had.  Any medical conditions you have.  Whether you are pregnant or may be pregnant.  What happens during the test?  A health care provider touching a person's throat during a medical exam.  During the exam, a health care provider does a short, often focused, physical exam and asks about your medical history to assess:  Your current symptoms.  Your overall health.  Your need for possible further medical intervention.  What can I expect after the test?  If you have a regular health care provider, make an appointment for a follow-up visit with him or her. If you do not have a regular health care provider, ask about resources in your community.    Your medical screening exam may determine that:  You do not need emergency treatment at this time.  You need treatment right away.  You need to be transferred to another medical center. This may happen if you need an emergent specialist or consultant that is not available at the medical center you are at.  You need to have more tests.  A medical specialist may be consulted if needed.    Get help right away if:  Your condition gets worse.  You develop new or troubling symptoms before you see your health care provider.  These symptoms may represent a serious problem that is an emergency. Do not wait to see if the symptoms will go away. Get medical help right away. Call your local emergency services (911 in the [place] Do not drive yourself to the hospital.    Summary  A medical screening exam helps to determine whether you need medical treatment right away. This type of exam may be done in an emergency department, an urgent care setting, or your health care provider's office.  During the exam, a health care provider does a short physical exam and asks about your current symptoms and overall health.  Depending on the exam, more tests or therapies may be ordered. However, an MSE does not necessarily mean that you will have further medical testing if your symptoms are not deemed to be urgent.  If you need further care that is not offered at your current medical center, you may need to be transferred to another facility.  This information is not intended to replace advice given to you by your health care provider. Make sure you discuss any questions you have with your health care provider.

## 2023-05-30 NOTE — ED ADULT NURSE NOTE - OBJECTIVE STATEMENT
c/o hip pain. ambulatory with strong steady gait. states he wants to eat, not able to explain why he is in ER. pt finally blurts out "hip pain" but unclear where. ambulatory with strong steady gait. When asked again pt does not answer.

## 2023-05-30 NOTE — ED ADULT TRIAGE NOTE - CHIEF COMPLAINT QUOTE
cathryn noonan from [place] s/p leaving [facility] asking for food cannot state why he needs to see a doctor and refusing vital signs

## 2023-05-30 NOTE — ED PROVIDER NOTE - PATIENT PORTAL LINK FT
You can access the FollowMyHealth Patient Portal offered by [facility] by registering at the following website: [email or web address]. By joining [employer]’s FollowMyHealth portal, you will also be able to view your health information using other applications (apps) compatible with our system.

## 2023-05-30 NOTE — ED ADULT NURSE NOTE - CHIEF COMPLAINT QUOTE
cathryn noonan from [place] s/p leaving [facility] asking for food cannot state why he needs to see a doctor and refusing vital signs

## 2023-05-30 NOTE — ED PROVIDER NOTE - NS ED ROS FT
+generalized body pain  Denies fevers, chills, nausea, vomiting, diarrhea, constipation, abdominal pain, urinary symptoms, chest pain, palpitations, shortness of breath, dyspnea on exertion, syncope/near syncope, cough/URI symptoms, headache, weakness, numbness, focal deficits, visual changes, gait or balance changes, dizziness

## 2023-05-30 NOTE — ED PROVIDER NOTE - OBJECTIVE STATEMENT
32-year-old male, unknown past medical history, undomiciled, presents to the emergency room complaining of generalized body pain and requesting food to eat.  Patient does not elaborate when his body pain began or where he specifically is feeling it.  He is however agreeable to a sandwich and is requesting Tylenol for pain.  Patient has had multiple visits to the ED for similar complaints in the past.

## 2023-05-31 ENCOUNTER — EMERGENCY (EMERGENCY)
Facility: HOSPITAL | Age: 33
LOS: 1 days | Discharge: HOME | End: 2023-05-31
Admitting: EMERGENCY MEDICINE
Payer: MEDICAID

## 2023-05-31 VITALS
DIASTOLIC BLOOD PRESSURE: 79 MMHG | RESPIRATION RATE: 16 BRPM | SYSTOLIC BLOOD PRESSURE: 111 MMHG | WEIGHT: 149.91 LBS | OXYGEN SATURATION: 100 % | HEART RATE: 78 BPM | HEIGHT: 66 IN | TEMPERATURE: 98 F

## 2023-05-31 DIAGNOSIS — R53.81 OTHER MALAISE: ICD-10-CM

## 2023-05-31 DIAGNOSIS — M79.18 MYALGIA, OTHER SITE: ICD-10-CM

## 2023-05-31 PROCEDURE — 99283 EMERGENCY DEPT VISIT LOW MDM: CPT

## 2023-05-31 RX ORDER — ACETAMINOPHEN 500 MG
650 TABLET ORAL ONCE
Refills: 0 | Status: COMPLETED | OUTPATIENT
Start: 2023-05-31 | End: 2023-05-31

## 2023-05-31 RX ADMIN — Medication 650 MILLIGRAM(S): at 12:27

## 2023-05-31 NOTE — ED PROVIDER NOTE - CLINICAL SUMMARY MEDICAL DECISION MAKING FREE TEXT BOX
31 y/o M presents complaining of "feeling sick" but does not elaborate on what his symptoms are. Patient requesting Tylenol, food, and a place to sleep. Patient has been seen in this ED multiple times for similar vague complaints.

## 2023-05-31 NOTE — ED ADULT TRIAGE NOTE - CHIEF COMPLAINT QUOTE
Pt BIBA from [place]. In triage Pt states "he's not feeling well". When asked to elaborate on symptoms pt unable to provide specifics and states he started feeling this way today or maybe yesterday. A&Ox4, steady gait VSS.

## 2023-05-31 NOTE — ED PROVIDER NOTE - PHYSICAL EXAMINATION
VITAL SIGNS: I have reviewed nursing notes and confirm.  CONST: Well-developed; No apparent distress.  ENT: No nasal discharge; airway clear.  EYES: Sclera clear. Pupils round and symmetrical bilaterally.  RESP: Breathing comfortably; speaking in full sentences.   MSK: No acute deformities noted to extremities.   NEURO: Alert, oriented. Speech is fluent and appropriate. Moving all extremities appropriately. No gross motor or sensory abnormalities.  SKIN: Skin is normal temperature; no diaphoresis; no pallor.  PSYCH: Appropriate mood, language, and behavior.

## 2023-05-31 NOTE — ED PROVIDER NOTE - PATIENT PORTAL LINK FT
You can access the FollowMyHealth Patient Portal offered by [facility] by registering at the following website: [email or web address]. By joining [employer]’s FollowMyHealth portal, you will also be able to view your health information using other applications (apps) compatible with our system.

## 2023-05-31 NOTE — ED ADULT NURSE NOTE - CHIEF COMPLAINT QUOTE
Pt BIBA from [place]. In triage Pt states "he's not feeling well". When asked to elaborate on symptoms pt unable to provide specifics and states he started feeling this way today or maybe yesterday. A&Ox4, steady gait VSS.

## 2023-05-31 NOTE — ED PROVIDER NOTE - OBJECTIVE STATEMENT
33 y/o M presents to the ED and reports he feels sick but is unwilling to answer questions about in what way he feels sick. He is requesting medications and I tried to explain that unless he answers questions I can not give him medications because he wont tell me what his symptoms are. Patient is becoming frustrated by this interview and finally says fine just give me Tylenol. Chart review shows multiple visits with similar vague symptoms. Patient requesting a place to sleep and requesting food.

## 2023-05-31 NOTE — ED ADULT NURSE NOTE - PRIMARY CARE PROVIDER
----- Message from Silvano Magaña M.D. sent at 4/25/2022  3:41 PM PDT -----  Please call and let patient know that his MRI showed no significant damage. It did show some mild degenerative changes. If he wants he can call and schedule a follow up to discuss options including PT and steroid injections.   
Spoke with pt, and schedule apt for 05/06/2022 with Dr. Isaac.  
none

## 2023-05-31 NOTE — ED ADULT NURSE NOTE - OBJECTIVE STATEMENT
BIBA from [place]; told triage nurse 'they didn't feel well and had body pain"; immediately upon being placed in chair asking staff and volunteers for blankets, food, drinks and a bed; appears sleepy and falling asleep in recliner chair; appears in no distress

## 2023-05-31 NOTE — ED ADULT NURSE NOTE - PAIN: PRESENCE, MLM
body pain?; c/o pain in triage but sleeping in chair and was before askign for food/complains of pain/discomfort

## 2023-05-31 NOTE — ED ADULT TRIAGE NOTE - HEIGHT IN CM
----- Message from Dieudonne Parker MD sent at 7/2/2018  9:14 AM CDT -----  Ok, terazol 3  
Patient given results of pap and instructions on terazol 3, rx sent.  
167.64

## 2023-06-01 ENCOUNTER — EMERGENCY (EMERGENCY)
Facility: HOSPITAL | Age: 33
LOS: 1 days | Discharge: HOME | End: 2023-06-01
Admitting: STUDENT IN AN ORGANIZED HEALTH CARE EDUCATION/TRAINING PROGRAM
Payer: SELF-PAY

## 2023-06-01 VITALS — WEIGHT: 199.96 LBS

## 2023-06-01 DIAGNOSIS — R46.89 OTHER SYMPTOMS AND SIGNS INVOLVING APPEARANCE AND BEHAVIOR: ICD-10-CM

## 2023-06-01 DIAGNOSIS — Z76.5 MALINGERER [CONSCIOUS SIMULATION]: ICD-10-CM

## 2023-06-01 PROCEDURE — 99282 EMERGENCY DEPT VISIT SF MDM: CPT

## 2023-06-01 PROCEDURE — 99283 EMERGENCY DEPT VISIT LOW MDM: CPT

## 2023-06-01 NOTE — ED ADULT NURSE NOTE - OBJECTIVE STATEMENT
Patient to ED c/o generalized dental pain x 3 day. Uncooperative with exam, requesting stretcher and food. AAOx4, NAD.

## 2023-06-01 NOTE — ED PROVIDER NOTE - NSFOLLOWUPCLINICS_GEN_ALL_ED_FT
[facility]  Family Medicine  [address]  [place], [place] [address]  Phone: [phone]  Fax:

## 2023-06-01 NOTE — ED ADULT TRIAGE NOTE - CHIEF COMPLAINT QUOTE
BIBEMS states, "I don't know my gum is bleeding and I just don't feel right, the other hospital gave me some pain medicine today but I don't know" Refusing vital signs in triage states, "don't touch me."

## 2023-06-01 NOTE — ED PROVIDER NOTE - OBJECTIVE STATEMENT
33 y/o male that is generally healthy presents to the ED due to "not feeling well." Pt denies pain, fever, cough, nausea, abdominal pain, and diarrhea. Pt reports that he did not get any sleep last night and does not use drugs or alcohol. Pt is uncooperative and not providing his history and refusing to cooperate with provider stating "don't touch me."

## 2023-06-01 NOTE — ED ADULT NURSE NOTE - THROAT
Only smoking 3 per day. Continues of venlafaxine 1 mg daily.   No quit date set.   On O2 supplement puff system PRN.    asymptomatic

## 2023-06-01 NOTE — ED ADULT NURSE NOTE - NSFALLUNIVINTERV_ED_ALL_ED
Bed/Stretcher in lowest position, wheels locked, appropriate side rails in place/Call bell, personal items and telephone in reach/Instruct patient to call for assistance before getting out of bed/chair/stretcher/Non-slip footwear applied when patient is off stretcher/[place] to call system/Physically safe environment - no spills, clutter or unnecessary equipment/Purposeful proactive rounding/Room/bathroom lighting operational, light cord in reach

## 2023-06-01 NOTE — ED PROVIDER NOTE - PATIENT PORTAL LINK FT
You can access the FollowMyHealth Patient Portal offered by [facility] by registering at the following website: [email or web address]. By joining [employer]’s FollowMyHealth portal, you will also be able to view your health information using other applications (apps) compatible with our system.

## 2023-06-25 ENCOUNTER — EMERGENCY (EMERGENCY)
Facility: HOSPITAL | Age: 33
LOS: 1 days | Discharge: LEFT BEFORE DISCHARGE | End: 2023-06-25
Attending: EMERGENCY MEDICINE | Admitting: EMERGENCY MEDICINE
Payer: MEDICAID

## 2023-06-25 ENCOUNTER — EMERGENCY (EMERGENCY)
Facility: HOSPITAL | Age: 33
LOS: 1 days | Discharge: HOME | End: 2023-06-25
Attending: EMERGENCY MEDICINE | Admitting: EMERGENCY MEDICINE
Payer: MEDICAID

## 2023-06-25 VITALS
WEIGHT: 179.9 LBS | HEART RATE: 82 BPM | HEIGHT: 66 IN | OXYGEN SATURATION: 96 % | DIASTOLIC BLOOD PRESSURE: 61 MMHG | TEMPERATURE: 98 F | SYSTOLIC BLOOD PRESSURE: 96 MMHG | RESPIRATION RATE: 16 BRPM

## 2023-06-25 VITALS
SYSTOLIC BLOOD PRESSURE: 117 MMHG | RESPIRATION RATE: 16 BRPM | HEIGHT: 66 IN | OXYGEN SATURATION: 97 % | TEMPERATURE: 98 F | HEART RATE: 77 BPM | DIASTOLIC BLOOD PRESSURE: 76 MMHG

## 2023-06-25 PROBLEM — Z78.9 OTHER SPECIFIED HEALTH STATUS: Chronic | Status: ACTIVE | Noted: 2023-06-01

## 2023-06-25 PROCEDURE — 99053 MED SERV 10PM-8AM 24 HR FAC: CPT

## 2023-06-25 PROCEDURE — 99283 EMERGENCY DEPT VISIT LOW MDM: CPT

## 2023-06-25 NOTE — ED PROVIDER NOTE - PATIENT PORTAL LINK FT
You can access the FollowMyHealth Patient Portal offered by [facility] by registering at the following website: [email or web address]. By joining [employer]’s FollowMyHealth portal, you will also be able to view your health information using other applications (apps) compatible with our system.

## 2023-06-25 NOTE — ED ADULT NURSE NOTE - NSFALLUNIVINTERV_ED_ALL_ED
Bed/Stretcher in lowest position, wheels locked, appropriate side rails in place/Call bell, personal items and telephone in reach/Instruct patient to call for assistance before getting out of bed/chair/stretcher/Non-slip footwear applied when patient is off stretcher/[place] to call system/Physically safe environment - no spills, clutter or unnecessary equipment/Purposeful proactive rounding/Room/bathroom lighting operational, light cord in reach

## 2023-06-25 NOTE — ED PROVIDER NOTE - CLINICAL SUMMARY MEDICAL DECISION MAKING FREE TEXT BOX
Patient presents with potential psychiatric history but denies suicidal ideation or auditory hallucinations he is resting comfortably really has no acute medical complaints would like a place to sleep until morning.  Patient has no obvious signs of trauma he has stable vital signs will discharge in the morning

## 2023-06-25 NOTE — ED ADULT NURSE NOTE - OBJECTIVE STATEMENT
Patient is a 32 y/o M c/o body aches. patient c/o generalized body aches for the past 1 week. When asked other symptoms, patient is poor historian and reports "Sarabjit I feel messed up in the head". Patient denies hallucination or si. Patient has no further complaints at this time

## 2023-06-25 NOTE — ED PROVIDER NOTE - OBJECTIVE STATEMENT
Patient reports generalized weakness. No fever, ha, cp, sob, ap, n/v/d, focal weakness, paresthesias, SI/HI. Was seen here this morning and discharged around 6:30am.

## 2023-06-25 NOTE — ED PROVIDER NOTE - CLINICAL SUMMARY MEDICAL DECISION MAKING FREE TEXT BOX
Patient complained of fatigue and generalized weakness. Given sandwich. After he ate it, patient said he felt well and walked out. Awake, alert, oriented x3, gait steady, speech clear at discharge. Refused repeat vital signs. GIven verbal dc instructions.

## 2023-06-25 NOTE — ED ADULT TRIAGE NOTE - BSA (M2)
Additional Notes: Lesions are currently crusted over. Symptoms started 6 days ago. Will monitor for now. Pt to apply Vaseline or aquaphor healing ointment PRN
Detail Level: Simple
1.91

## 2023-06-25 NOTE — ED PROVIDER NOTE - PATIENT PORTAL LINK FT
You can access the FollowMyHealth Patient Portal offered by [facility] by registering at the following website: [email or web address]. By joining [employer]’s FollowMyHealth portal, you will also be able to view your health information using other applications (apps) compatible with our system.

## 2023-06-25 NOTE — ED ADULT NURSE NOTE - NSFALLUNIVINTERV_ED_ALL_ED
Bed/Stretcher in lowest position, wheels locked, appropriate side rails in place/Call bell, personal items and telephone in reach/Instruct patient to call for assistance before getting out of bed/chair/stretcher/Non-slip footwear applied when patient is off stretcher/[place] to call system/Physically safe environment - no spills, clutter or unnecessary equipment/Purposeful proactive rounding/Room/bathroom lighting operational, light cord in reach

## 2023-06-25 NOTE — ED PROVIDER NOTE - OBJECTIVE STATEMENT
32 yo M, endorses psychiatric history but unable to give names of medication or condition. Patient is a poor historian requesting a blanket and unable to really provide a history of while he is here.  He notes "I am messed up in the head".  He denies suicidal ideations auditory hallucinations denies recent trauma otherwise denies any other medical complaints.  Denies drugs alcohol or smoking.

## 2023-06-27 DIAGNOSIS — R20.2 PARESTHESIA OF SKIN: ICD-10-CM

## 2023-06-27 DIAGNOSIS — Z13.9 ENCOUNTER FOR SCREENING, UNSPECIFIED: ICD-10-CM

## 2023-06-27 DIAGNOSIS — T73.0XXA STARVATION, INITIAL ENCOUNTER: ICD-10-CM

## 2023-06-27 DIAGNOSIS — R53.1 WEAKNESS: ICD-10-CM
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
## 2023-08-01 NOTE — BH INPATIENT PSYCHIATRY PROGRESS NOTE - NSDCCRITERIA_PSY_ALL_CORE
83 When pt is no longer an acute or imminent risk of harm to self or others, and is able to care for self safely, pt may then be discharged.

## 2023-08-09 ENCOUNTER — EMERGENCY (EMERGENCY)
Facility: HOSPITAL | Age: 33
LOS: 1 days | Discharge: HOME | End: 2023-08-09
Admitting: EMERGENCY MEDICINE
Payer: MEDICAID

## 2023-08-09 VITALS
WEIGHT: 169.98 LBS | HEIGHT: 69 IN | RESPIRATION RATE: 18 BRPM | HEART RATE: 86 BPM | SYSTOLIC BLOOD PRESSURE: 94 MMHG | TEMPERATURE: 98 F | OXYGEN SATURATION: 99 % | DIASTOLIC BLOOD PRESSURE: 65 MMHG

## 2023-08-09 VITALS
TEMPERATURE: 98 F | DIASTOLIC BLOOD PRESSURE: 76 MMHG | RESPIRATION RATE: 18 BRPM | WEIGHT: 160.06 LBS | SYSTOLIC BLOOD PRESSURE: 108 MMHG | OXYGEN SATURATION: 99 % | HEIGHT: 69 IN | HEART RATE: 76 BPM

## 2023-08-09 DIAGNOSIS — T73.0XXA STARVATION, INITIAL ENCOUNTER: ICD-10-CM
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
## 2023-08-09 PROCEDURE — 99283 EMERGENCY DEPT VISIT LOW MDM: CPT

## 2023-08-09 RX ORDER — ACETAMINOPHEN 500 MG
650 TABLET ORAL ONCE
Refills: 0 | Status: COMPLETED | OUTPATIENT
Start: 2023-08-09 | End: 2023-08-09

## 2023-08-09 RX ADMIN — Medication 650 MILLIGRAM(S): at 12:09

## 2023-08-09 NOTE — ED ADULT TRIAGE NOTE - CHIEF COMPLAINT QUOTE
here for nausea- has been seen at [facility] and [facility] this morning- pt denies any abdominal pain

## 2023-08-09 NOTE — ED ADULT NURSE NOTE - CHIEF COMPLAINT QUOTE
here for nausea- has been seen at [facility] and [facility] this morning- pt denies any abdominal pain

## 2023-08-09 NOTE — ED PROVIDER NOTE - PATIENT PORTAL LINK FT
You can access the FollowMyHealth Patient Portal offered by [facility] by registering at the following website: [email or web address]. By joining [employer]’s FollowMyHealth portal, you will also be able to view your health information using other applications (apps) compatible with our system.

## 2023-08-09 NOTE — ED ADULT NURSE NOTE - NSFALLUNIVINTERV_ED_ALL_ED
Bed/Stretcher in lowest position, wheels locked, appropriate side rails in place/Call bell, personal items and telephone in reach/Instruct patient to call for assistance before getting out of bed/chair/stretcher/Non-slip footwear applied when patient is off stretcher/[place] to call system/Physically safe environment - no spills, clutter or unnecessary equipment/Purposeful proactive rounding/Room/bathroom lighting operational, light cord in reach

## 2023-08-09 NOTE — ED PROVIDER NOTE - OBJECTIVE STATEMENT
32 yo male undomiciled presents for evaluation. he was sleeping initially. he offers no medical complaints and asking to rest. also asking for food. he has been to two other hospitals today before coming here although he wont tell me why he was there.

## 2023-08-09 NOTE — ED ADULT NURSE NOTE - OBJECTIVE STATEMENT
Pt is a 33y male. Uncooperative with exam and not forthcoming with responses. When asked if patient is in pain, has any medical history, takes any medication, patient replies "I don't know" to each questions. Denies drug or alcohol use. Pt asking for food.

## 2023-08-09 NOTE — ED PROVIDER NOTE - OBJECTIVE STATEMENT
34 yo male seen earlier by me returns by EMS. Has no medical complaints, asking to rest. undomiciled

## 2023-08-09 NOTE — ED PROVIDER NOTE - TOBACCO USE
Follow up with PCP within 1-2 weeks of discharge.   Follow up with cardiology within 1-2 weeks of discharge - You have an appt with DR STACIE MONTANO  on 6/23 at 1pm. Unknown if ever smoked

## 2023-08-09 NOTE — ED PROVIDER NOTE - NSFOLLOWUPINSTRUCTIONS_ED_ALL_ED_FT
Follow up with your primary care doctor or clinics listed below if you do not have a doctor  [address] [address]  To make an appointment, call [phone]  [facility]  Address: [address] [place] [address]  Appointment Center: [phone] ([phone])    Return for any concerns.

## 2023-08-09 NOTE — ED PROVIDER NOTE - PRO INTERPRETER NEED 2
Keyanna Brewster presents for her 1st OB visit.  She is a 30 year old  female. Patient's last menstrual period was 2021 (exact date). with an estimated date of confinement (EDC) 21.  She is here with her spouse Paolo.   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  Aura?  Yes. Resolved with piercings  2) Personal or family history of heart issues, mitral valve prolapse, deep vein thrombosis (DVT), or blood disorders?  No   3) Blood transfusion?  Yes  4) Would you refuse a blood transfusion if medically necessary?  No  5) Thyroid disease?  No  6) Depression?  Yes, has taken antidepressants.   7) Asthma?  No  8) Abnormal PAP?  Yes, 3/16/20 ASCUS/HPV - neg  9) Gynecologic (GYN) surgical procedures?  Loop Electrosurgical Excision Procedure (LEEP)/Colposcopy (Colpo)/Cone/Cryosurgery (Cryo)/Dilatation and Curettage (D&C)?  No  10) Had chicken pox or was vaccinated?  Yes  11) Sexually Transmitted Diseases (STDs)?  No  12) Have you or your spouse ever had Genital herpes?  No  13) Any travels before pregnancy or plans to travel during pregnancy?  No     OB History    Para Term  AB Living   2 0 0 0 1 0   SAB TAB Ectopic Molar Multiple Live Births   1 0 0 0 0 0     MENSTRUAL HISTORY (HX):  Cycles are every 4 weeks; with no irregularities.   She is certain of her LMP.    SYMPTOMS SINCE LAST MENSTRUAL PERIOD (LMP):  Nausea:  No  Vomiting:  No  Bleeding:  No    PAP smears have been abnormal, 3/16/20 - ASCUS - HPV - NEG..     ALLERGIES:  No Known Allergies    MEDICATIONS:  Current Outpatient Medications   Medication   • Prenatal Vit-Fe Fumarate-FA (multivitamin & mineral w/folic acid- PRENATAL) 27-1 MG Tab   • tranexamic acid (LYSTEDA) 650 MG tablet   • ibuprofen (MOTRIN) 200 MG tablet     No current facility-administered medications for this visit.       PAST MED HX:      Rotator cuff injury                                          Comment: right, torn    Breast cancer screening other than mammogram    2021      Comment: MICHAEL (HIGH RISK) 10YR 0.8%, LIFETIME 24.8%,                BRCA 1/2 RISK 0.14% / 0.30%    ASCUS of cervix with negative high risk HPV     2020    PAST SURGICAL HX:    TRANSFUSION PROCEDURE                                       Comment: Packed RBCs -  History heavy menses.    FAMILY HX:  Family History   Problem Relation Age of Onset   • Cancer, Breast Paternal Grandmother    • Chromosomal Disorder Sister         Triple X   • Cancer, Ovarian Neg Hx      Review of patient's family status indicates:    Mother                         Alive                     Father                         Alive                     Paternal Grandmother                         72    Maternal Grandmother                             Paternal Aunt                  Alive                     Sister                         Alive                     Maternal Grandfather           Other                     Paternal Grandfather           Alive                     Neg Hx                                                   SOCIAL HX:    Tobacco Use: Passive            Comment: In the pst lived with a smoker.      Alcohol Use: Not Current*       Comment: When not pregnant once or twice a month      Drug Use:    No                Sexually Active: Yes             Partners with: Male       Comment: Trying for pregnancy         OBJECTIVE:  Medical Assistant (MA) notes reviewed and agree.  Reviewed allergies, medical, surgical, family, obstetrical, and social hx.   Blood pressure 122/64, height 5' 9\" (1.753 m), weight 105.9 kg, last menstrual period 2021.  General Appearance:  Well groomed.   Neuropsych:  Mood and affect appropriate.    Skin:  No rashes.   Neck:  No masses.  No enlargement of the thyroid.   Respiratory:  Respiratory effort normal.    Cardiovascular:  No leg swelling or varicosities.   Breasts:   Symmetrical without masses.  No skin changes.  No axillary lymphadenopathy.   Abdomen:  No masses or tenderness.  No evidence of hernia.  No hepatomegaly or splenomegaly.   Pelvic:   External genitalia:  Normal.   Vagina:  Normal mucosa, no unusual discharge.    Cervix:  No lesions.    Uterus:  Non tender, no masses. 8 week size.   Adnexa:  No masses or tenderness.        ultrasound used to confirm single Intrauterine Pregnancy (IUP) with fetal heart beat.    The following topics were reviewed/patient was given written information regarding the following topics:    Human Immunodeficiency Virus (HIV) and other routine prenatal tests    Risk factors identified by prenatal history    Anticipated course of prenatal care    Nutrition and weight gain counseling; special diet    Toxoplasmosis precautions (cats/raw meat)    Sexual activity    Exercise    Influenza vaccine    Smoking counseling    Environmental/Work hazards    Travel and ZIKA precautions    Tobacco (ask, advise, assess, assist and arrange)    Alcohol    Illicit/recreational drugs    Use of any medications (including supplements, vitamins, herbal or   over-the-counter [OTC] drugs)    Indications for Ultrasound    Seat belt use    Childbirth Classes/Hospital facilities  Materni T 21, Nuchal Translucency with Blood test at 11-14 weeks, maternal serum Alpha Fetoprotein (AFP) at 15-20 weeks, Quadruple Screen at 15-20 weeks, Genetic Consultation, Maternal Fetal Medicine Consultation, Chorionic Villus Sampling (CVS) at 11-14 weeks, Amniocentesis at 14-43 weeks, Cystic Fibrosis Screening and Cord Blood Storage options discussed with the patient.    ASSESSMENT:  8 week 2 days pregnancy.    PLAN:  Routine OB labs/PAP done; abnormal results to be communicated via phone call.   [facility] discussed and patient was provided with information regarding [facility].  Discussed genetic screening with patient.  Will call if she desires. Undecided.  Return to clinic (RTC) in 4  weeks for OB check.  Results will be conveyed via     Cell Phone:   Telephone Information:   Mobile [phone]     Okay to leave a message containing results? Yes       English

## 2023-08-09 NOTE — ED PROVIDER NOTE - PHYSICAL EXAMINATION
CONSTITUTIONAL: Well-appearing; well-nourished; in no apparent distress.   	HEAD: Normocephalic; atraumatic.   	EYES:  clear bilaterally  ENT: airway patent  Resp breathing comfortably with no distress  STERN x 4. normal gait.   PSYCHOLOGICAL: The patient’s mood and manner are appropriate.

## 2023-08-09 NOTE — ED ADULT NURSE NOTE - OBJECTIVE STATEMENT
Pt to ED reporting "doesn't feel well", unwilling to give more details on condition. Uncooperative during assessment. Breathing even and unlabored.

## 2023-08-09 NOTE — ED PROVIDER NOTE - PATIENT PORTAL LINK FT
You can access the FollowMyHealth Patient Portal offered by [facility] by registering at the following website: [email or web address]. By joining [email or web address]’s FollowMyHealth portal, you will also be able to view your health information using other applications (apps) compatible with our system.

## 2023-08-09 NOTE — ED PROVIDER NOTE - NSFOLLOWUPINSTRUCTIONS_ED_ALL_ED_FT
Follow up with your primary care doctor or clinics listed below if you do not have a doctor  [address] [address]  To make an appointment, call [phone]  [facility]  Address: [address] [place] [address]  Appointment Center: [phone] ([phone])

## 2023-08-10 ENCOUNTER — EMERGENCY (EMERGENCY)
Facility: HOSPITAL | Age: 33
LOS: 1 days | Discharge: LEFT BEFORE DISCHARGE | End: 2023-08-10
Attending: EMERGENCY MEDICINE | Admitting: EMERGENCY MEDICINE
Payer: MEDICAID

## 2023-08-10 ENCOUNTER — EMERGENCY (EMERGENCY)
Facility: HOSPITAL | Age: 33
LOS: 1 days | Discharge: LEFT BEFORE DISCHARGE | End: 2023-08-10
Admitting: EMERGENCY MEDICINE
Payer: MEDICAID

## 2023-08-10 VITALS
HEIGHT: 69 IN | OXYGEN SATURATION: 96 % | SYSTOLIC BLOOD PRESSURE: 103 MMHG | HEART RATE: 79 BPM | DIASTOLIC BLOOD PRESSURE: 71 MMHG | TEMPERATURE: 98 F | RESPIRATION RATE: 17 BRPM

## 2023-08-10 VITALS
HEART RATE: 70 BPM | TEMPERATURE: 98 F | SYSTOLIC BLOOD PRESSURE: 104 MMHG | HEIGHT: 66 IN | DIASTOLIC BLOOD PRESSURE: 66 MMHG | RESPIRATION RATE: 16 BRPM | OXYGEN SATURATION: 98 % | WEIGHT: 164.91 LBS

## 2023-08-10 DIAGNOSIS — R11.0 NAUSEA: ICD-10-CM

## 2023-08-10 DIAGNOSIS — R51.9 HEADACHE, UNSPECIFIED: ICD-10-CM

## 2023-08-10 DIAGNOSIS — Z53.21 PROCEDURE AND TREATMENT NOT CARRIED OUT DUE TO PATIENT LEAVING PRIOR TO BEING SEEN BY HEALTH CARE PROVIDER: ICD-10-CM

## 2023-08-10 DIAGNOSIS — Z53.29 PROCEDURE AND TREATMENT NOT CARRIED OUT BECAUSE OF PATIENT'S DECISION FOR OTHER REASONS: ICD-10-CM

## 2023-08-10 PROCEDURE — 99283 EMERGENCY DEPT VISIT LOW MDM: CPT

## 2023-08-10 PROCEDURE — L9991: CPT

## 2023-08-10 NOTE — ED ADULT NURSE REASSESSMENT NOTE - NS ED NURSE REASSESS COMMENT FT1
Pt. walked out of the emergency room, flagged down another ambulance and returned to ED, when asking pt. his reason for visit he responds "I don't know, I have a headache"

## 2023-08-10 NOTE — ED PROVIDER NOTE - CLINICAL SUMMARY MEDICAL DECISION MAKING FREE TEXT BOX
34 yo m pw c/o I am not feeling well when asked the exact nature of his complaint pt repeats I am not feeling well and does not offer any concrete medical complaints. Pt refused a physical exam or any more information during ROS, pt appears grossly atraumatic, with no apparent neurologic deficits with steady gait. and normal vital signs.

## 2023-08-10 NOTE — ED PROVIDER NOTE - NSFOLLOWUPCLINICS_GEN_ALL_ED_FT
[facility]  Family Medicine  [address]  [place], [place] [address]  Phone: [phone]  Fax:

## 2023-08-10 NOTE — ED PROVIDER NOTE - PATIENT PORTAL LINK FT
You can access the FollowMyHealth Patient Portal offered by [facility] by registering at the following website: [email or web address]. By joining [employer]’s FollowMyHealth portal, you will also be able to view your health information using other applications (apps) compatible with our system.

## 2023-08-11 DIAGNOSIS — Z00.00 ENCOUNTER FOR GENERAL ADULT MEDICAL EXAMINATION WITHOUT ABNORMAL FINDINGS: ICD-10-CM

## 2023-08-11 DIAGNOSIS — Z59.00 HOMELESSNESS UNSPECIFIED: ICD-10-CM

## 2023-08-11 SDOH — ECONOMIC STABILITY - HOUSING INSECURITY: HOMELESSNESS UNSPECIFIED: Z59.00

## 2023-08-13 NOTE — BH INPATIENT PSYCHIATRY PROGRESS NOTE - NSTXPSYCHOPROGRES_PSY_ALL_CORE
No Change
- - -
No Change

## 2023-08-23 NOTE — ED PROVIDER NOTE - CHIEF COMPLAINT
#ESRD - He has been on dialysis since April 2023 - he continues to be dependent on dialysis. BEULAH --> progressed to ESRD now.   Appreciate vascular surgery consult for AVF eval. unable to accommodate for OR today.  HD today as planned.       # Medication toxicity monitoring: medication dose reviewed. Please dose medications to CrCl<15    The rest of the recommendations as per fellow's note.    Xochitl Penny MD  Attending Nephrologist  [phone] or via [place] . #ESRD - He has been on dialysis since April 2023 - Etiology of the BEULAH - interstitial fibrosis and acute pyelonephritis. He continues to be dependent on dialysis. BEULAH --> progressed to ESRD now.   S/P left forearm avf.   HD today as planned.       # Medication toxicity monitoring: medication dose reviewed. Please dose medications to CrCl<15    The rest of the recommendations as per fellow's note.    Xochitl Penny MD  Attending Nephrologist  [phone] or via fflap . The patient is a 33y Male complaining of

## 2023-09-04 ENCOUNTER — EMERGENCY (EMERGENCY)
Facility: HOSPITAL | Age: 33
LOS: 1 days | Discharge: HOME | End: 2023-09-04
Admitting: EMERGENCY MEDICINE
Payer: MEDICAID

## 2023-09-04 VITALS
HEART RATE: 92 BPM | DIASTOLIC BLOOD PRESSURE: 55 MMHG | OXYGEN SATURATION: 99 % | TEMPERATURE: 98 F | HEIGHT: 66 IN | SYSTOLIC BLOOD PRESSURE: 90 MMHG | RESPIRATION RATE: 16 BRPM

## 2023-09-04 VITALS
RESPIRATION RATE: 100 BRPM | DIASTOLIC BLOOD PRESSURE: 65 MMHG | HEART RATE: 85 BPM | SYSTOLIC BLOOD PRESSURE: 101 MMHG | OXYGEN SATURATION: 99 %

## 2023-09-04 PROCEDURE — 99283 EMERGENCY DEPT VISIT LOW MDM: CPT | Mod: 25

## 2023-09-04 NOTE — ED ADULT NURSE NOTE - NSFALLUNIVINTERV_ED_ALL_ED
Bed/Stretcher in lowest position, wheels locked, appropriate side rails in place/Call bell, personal items and telephone in reach/Instruct patient to call for assistance before getting out of bed/chair/stretcher/Non-slip footwear applied when patient is off stretcher/[facility] [place] to call system/Physically safe environment - no spills, clutter or unnecessary equipment/Purposeful proactive rounding/Room/bathroom lighting operational, light cord in reach

## 2023-09-04 NOTE — ED PROVIDER NOTE - NSFOLLOWUPINSTRUCTIONS_ED_ALL_ED_FT
Follow up with your primary care doctor or clinics listed below if you do not have a doctor,    [address] [address]  To make an appointment, call [phone]    [facility]  Address: [employer] [place] [address]  Appointment Center: [phone] ([phone])     [facility] LIFE NET is a good referral line for crisis and substance abuse help.  [facility] has drop in programs all over the city.    Return to the ER for Emergencies.  Return immediately for any new or worsening symptoms or any new concerns

## 2023-09-04 NOTE — ED PROVIDER NOTE - PHYSICAL EXAMINATION
Gen - Unkempt M, NAD, no respiratory distress, sleeping in chair, refused to answer questions   Skin - no acute rash, intact   HEENT - AT/NC, no conjunctival injection or dc, neck supple and FROM, airway patent   MSK - w/w/p, full ROM of peripheral joints, no midline tenderness   Psych - euphoria, normal speech and avoids eye contact, judgement/insight intact   Neuro - AxOx3, ambulatory with steady gait

## 2023-09-04 NOTE — ED PROVIDER NOTE - OBJECTIVE STATEMENT
32 yo M with no known PMHx, high utilizer of ER, uncooperative on exam and history, frequent visit for "not feeling well" and wanting to sleep, but refused to answer question or elaborate on his complaints. Sleeping in waiting room and refused to provide additional information. No apparent trauma, bleeding, respiratory distress, N/V, pain, focal weakness, urinary/bowel incontinence or tremors noted.

## 2023-09-04 NOTE — ED PROVIDER NOTE - PATIENT PORTAL LINK FT
You can access the FollowMyHealth Patient Portal offered by [facility] by registering at the following website: [email or web address]. By joining [employer]’s FollowMyHealth portal, you will also be able to view your health information using other applications (apps) compatible with our system.

## 2023-09-04 NOTE — ED ADULT TRIAGE NOTE - CHIEF COMPLAINT QUOTE
Pt BIBA c/o medical evaluation. States feels "unwell for the last few weeks". Does not elaborate further on sx.

## 2023-09-04 NOTE — ED PROVIDER NOTE - CLINICAL SUMMARY MEDICAL DECISION MAKING FREE TEXT BOX
medical screening exam has been performed. Pt refused to elaborate during history and exam. Attempted to reassess multiple times but pt was found sleeping throughout entire stay. Given food and tolerating without N/V.  Pt with no acute trauma or emergencies noted and exam wnl.  hemodynamically stable and non toxic appearing, medically stable for dc. f/u instructions have been provided

## 2023-09-07 DIAGNOSIS — Z00.00 ENCOUNTER FOR GENERAL ADULT MEDICAL EXAMINATION WITHOUT ABNORMAL FINDINGS: ICD-10-CM

## 2023-09-07 NOTE — ED ADULT NURSE NOTE - NSICDXFAMILYHX_GEN_ALL_CORE_FT
SUBJECTIVE:  HPI    Beth Zavala is a 23 m.o. female here accompanied by mother for Fever and Nasal Congestion.  She had been feeling well until 2 days ago when she began having a high fever and vomiting associated with general malaise.  She was diagnosed with strep throat by nurse practitioner in prescribed amoxicillin about 10 days ago.  She completed the antibiotics.    Awildas allergies, medications, history, and problem list were updated as appropriate.    ROS:  Pertinent ROS as above, otherwise negative    OBJECTIVE:  Vital signs  Vitals:    09/07/23 1407   Pulse: 122   Temp: 98.4 °F (36.9 °C)   TempSrc: Axillary   SpO2: 95%   Weight: 12.3 kg (27 lb 3.2 oz)      PHYSICAL EXAM:  General:  Awake, alert, no acute distress   Eyes:  Pupils equal, round, reactive to light.  Conjunctiva normal bilaterally.  Ears:  Bilateral external auditory canals normal.  Left tympanic membrane retracted with opaque effusion.  Right tympanic membrane normal.    Nares:  Bilateral turbinate erythema and edema with clear rhinorrhea.  Oropharynx:  Clear.  No erythema or exudate.  Neck:  No lymphadenopathy  Cardiovascular: Regular rate and rhythm.  No murmurs.  Respiratory: Clear to auscultation bilaterally, normal effort  Skin: No rashes    ASSESSMENT/PLAN:  1. Acute left otitis media  -     cefdinir (OMNICEF) 125 mg/5 mL suspension; Take 3 mLs (75 mg total) by mouth 2 (two) times daily. for 10 days  Dispense: 60 mL; Refill: 0      Follow Up:  Follow up in about 3 weeks (around 9/28/2023) for Follow-up.    
FAMILY HISTORY:  No pertinent family history in first degree relatives

## 2023-09-12 ENCOUNTER — EMERGENCY (EMERGENCY)
Facility: HOSPITAL | Age: 33
LOS: 1 days | Discharge: HOME | End: 2023-09-12
Attending: STUDENT IN AN ORGANIZED HEALTH CARE EDUCATION/TRAINING PROGRAM | Admitting: STUDENT IN AN ORGANIZED HEALTH CARE EDUCATION/TRAINING PROGRAM
Payer: MEDICAID

## 2023-09-12 VITALS
HEART RATE: 73 BPM | RESPIRATION RATE: 18 BRPM | DIASTOLIC BLOOD PRESSURE: 58 MMHG | OXYGEN SATURATION: 96 % | WEIGHT: 179.9 LBS | TEMPERATURE: 98 F | HEIGHT: 66 IN | SYSTOLIC BLOOD PRESSURE: 94 MMHG

## 2023-09-12 PROCEDURE — 99283 EMERGENCY DEPT VISIT LOW MDM: CPT

## 2023-09-12 PROCEDURE — 99282 EMERGENCY DEPT VISIT SF MDM: CPT

## 2023-09-12 NOTE — ED PROVIDER NOTE - OBJECTIVE STATEMENT
33 yr old male, denies medical history, frequent ED visits for "not feeling well," presents to the Emergency Department requesting food. pt states "Im not sure why im here." not forthcoming with answers. no clear medical complaints. denies pain. denies injuries. requesting food, ate one sandwich already and is requesting a second.

## 2023-09-12 NOTE — ED PROVIDER NOTE - NSFOLLOWUPINSTRUCTIONS_ED_ALL_ED_FT
Drink plenty of fluids, get plenty of rest.   Continue all medications as previously prescribed.     Follow up with your primary care doctor within 1 week for continued evaluation. If you do not have a primary care doctor call office listed below to make an appointment.     Return to the Emergency Department for persistent, worsening or new symptoms including severe chest pain, shortness of breath, difficulty breathing, nausea/vomiting, excessive sweating, or any other serious concerns.     ___    PRIMARY CARE -  1) [facility]  Phone: [phone]  [address] [place] [address]    2) [facility]  Phone: [phone]  [address] , [place] [address]

## 2023-09-12 NOTE — ED ADULT TRIAGE NOTE - TEMPERATURE IN CELSIUS (DEGREES C)
[FreeTextEntry3] : MADAY Villatoro has acted like a scribe on my behalf.  I have reviewed the note and edited where appropriate.  History, PE, assessment, and plan were personally performed by me.\par  36.8

## 2023-09-12 NOTE — ED PROVIDER NOTE - PHYSICAL EXAMINATION
Constitutional : Well appearing, non-toxic, no acute distress. awake, alert, oriented to person, place, time/situation.  Head : head normocephalic, atraumatic  EENMT : eyes clear bilaterally, PERRL, EOMI. airway patent. moist mucous membranes. neck supple.  Cardiac : Extremities warm and well perfused. 2+ radial and DP pulses. cap refill <2 seconds. no LE edema.  Resp : Respirations even and unlabored.   MSK :  range of motion is not limited, no muscle or joint tenderness  Neuro : Alert and oriented, CNII-XII grossly intact, no focal deficits, no motor or sensory deficits.  Skin : Skin normal color for race, warm, dry and intact. No evidence of rash.  Psych : Alert and oriented to person, place, time/situation. normal mood and affect. no apparent risk to self or others.

## 2023-09-12 NOTE — ED ADULT NURSE NOTE - OBJECTIVE STATEMENT
alert and orientedx4, presenting to ed c/o nasal congestion and dry cough without fever. Patient requesting food.

## 2023-09-12 NOTE — ED PROVIDER NOTE - CLINICAL SUMMARY MEDICAL DECISION MAKING FREE TEXT BOX
states "I don't know why im here"   would like something to eat  no other requests  given food. pt stable for dc no clear medical history. frequent ed visits for vague complaints. states "I don't know why im here" denies pain or injuries. requesting food.   pt nontoxic appearing, borderline hypotensive but same BPs on multiple triage notes in past. exam unremarkable  would like something to eat. no other requests  given food. offered other resources which he declined. pt stable for dc

## 2023-09-12 NOTE — ED PROVIDER NOTE - PATIENT PORTAL LINK FT
You can access the FollowMyHealth Patient Portal offered by [facility] by registering at the following website: [email or web address]. By joining [employer]’s FollowMyHealth portal, you will also be able to view your health information using other applications (apps) compatible with our system.

## 2023-09-12 NOTE — ED PROVIDER NOTE - NS ED ATTENDING STATEMENT MOD
This was a shared visit with the LANA. I reviewed and verified the documentation and independently performed the documented:

## 2023-09-14 DIAGNOSIS — Z00.00 ENCOUNTER FOR GENERAL ADULT MEDICAL EXAMINATION WITHOUT ABNORMAL FINDINGS: ICD-10-CM

## 2023-10-02 ENCOUNTER — EMERGENCY (EMERGENCY)
Facility: HOSPITAL | Age: 33
LOS: 1 days | Discharge: HOME | End: 2023-10-02
Admitting: EMERGENCY MEDICINE
Payer: MEDICAID

## 2023-10-02 VITALS
HEART RATE: 89 BPM | SYSTOLIC BLOOD PRESSURE: 106 MMHG | OXYGEN SATURATION: 99 % | RESPIRATION RATE: 17 BRPM | DIASTOLIC BLOOD PRESSURE: 75 MMHG | TEMPERATURE: 98 F | HEIGHT: 66 IN

## 2023-10-02 VITALS — HEART RATE: 69 BPM

## 2023-10-02 PROCEDURE — 99284 EMERGENCY DEPT VISIT MOD MDM: CPT

## 2023-10-02 NOTE — ED PROVIDER NOTE - PHYSICAL EXAMINATION
Gen - Unkempt M, NAD, comfortable and non-toxic appearing  Skin - warm, dry, intact   HEENT - AT/NC, no nasal discharge, airway patent, neck supple and FROM  CV - S1S2, R/R/R  Resp - CTAB, no r/r/w  GI - soft, ND, NT, no CVAT b/l   MS - w/w/p, no c/c/e, No acute or gross deformities noted to extremities. No midline spinal tenderness or step off on palpation  Neuro - AxOx3, no focal neuro deficits, ambulatory without gait disturbance

## 2023-10-02 NOTE — ED ADULT NURSE NOTE - OBJECTIVE STATEMENT
Pt cooperative but not allowing orthostatics. Steady gait. Ate sandwich and cookies without complication.

## 2023-10-02 NOTE — ED PROVIDER NOTE - OBJECTIVE STATEMENT
34 yo M with no known PMHx, high utilizer of ER, uncooperative on exam and history, frequent visit for "not feeling well" and feeling lightheaded all day, because he didn't eat much and didn't get to sleep for the past few nights. Sleeping in chair and refused to provide additional information. No apparent trauma, bleeding, respiratory distress, N/V, pain, focal weakness, urinary/bowel incontinence or tremors noted. 34 yo M with no known PMHx, high utilizer of ER, uncooperative on exam and history, frequent visit for "not feeling well" and feeling lightheaded all day, because he didn't eat much and didn't get to sleep for the past few nights. Admits to smoking some weeds today. Sleeping in chair and refused to provide additional information. No apparent trauma, bleeding, respiratory distress, N/V, pain, focal weakness, urinary/bowel incontinence or tremors noted.

## 2023-10-02 NOTE — ED ADULT NURSE NOTE - CHIEF COMPLAINT QUOTE
BIBEMS from [place] for medical eval. Pt reports he feels lightheaded since earlier today. denies dizziness, pain, fever/chills. denies pmh.

## 2023-10-02 NOTE — ED ADULT NURSE NOTE - NSFALLUNIVINTERV_ED_ALL_ED
Bed/Stretcher in lowest position, wheels locked, appropriate side rails in place/Call bell, personal items and telephone in reach/Instruct patient to call for assistance before getting out of bed/chair/stretcher/Non-slip footwear applied when patient is off stretcher/[place] to call system/Physically safe environment - no spills, clutter or unnecessary equipment/Purposeful proactive rounding/Room/bathroom lighting operational, light cord in reach

## 2023-10-02 NOTE — ED PROVIDER NOTE - PATIENT PORTAL LINK FT
CONJUNCTIVITIS (ALLERGIC), OU: PATADAY PRN. RETURN FOR FOLLOW-UP AS SCHEDULED. You can access the FollowMyHealth Patient Portal offered by [facility] by registering at the following website: [email or web address]. By joining [employer]’s FollowMyHealth portal, you will also be able to view your health information using other applications (apps) compatible with our system.

## 2023-10-02 NOTE — ED PROVIDER NOTE - NSFOLLOWUPINSTRUCTIONS_ED_ALL_ED_FT
Follow up with your primary care doctor or clinics listed below if you do not have a doctor,    [address] [address]  To make an appointment, call [phone]    [facility]  Address: [facility] [address] [place] [address]  Appointment Center: [phone] ([phone])     [facility] LIFE NET is a good referral line for crisis and substance abuse help.  [facility] has drop in programs all over the city.    Return to the ER for Emergencies.  Return immediately for any new or worsening symptoms or any new concerns

## 2023-10-02 NOTE — ED PROVIDER NOTE - CLINICAL SUMMARY MEDICAL DECISION MAKING FREE TEXT BOX
medical screening exam has been performed. Pt refused to elaborate during history and exam. Attempted to reassess multiple times but pt was found sleeping throughout entire stay. Given food and tolerating without N/V.  Pt with no acute trauma or emergencies noted and exam wnl.  euglycemic, orthostatic negative, EKG with no ischemic findings, medical stable for dc to f/u with outpt clinic medical screening exam has been performed. Pt refused to elaborate during history and exam. Attempted to reassess multiple times but pt was found sleeping throughout entire stay. Refused to cooperate with orthostatics and took a while for EKG, kept refusing assessment in the ED. Given food and tolerating without N/V.  Pt with no acute trauma or emergencies noted and exam wnl.  euglycemic, orthostatic negative, EKG with no ischemic findings, medical stable for dc to f/u with outpt clinic

## 2023-10-02 NOTE — ED ADULT TRIAGE NOTE - CHIEF COMPLAINT QUOTE
BIBEMS from [place] for medical eval. Pt reports he feels lightheaded since earlier today. denies dizziness, pain, fever/chills. denies pmh.

## 2023-10-03 ENCOUNTER — EMERGENCY (EMERGENCY)
Facility: HOSPITAL | Age: 33
LOS: 1 days | Discharge: HOME | End: 2023-10-03
Admitting: EMERGENCY MEDICINE
Payer: MEDICAID

## 2023-10-03 VITALS
OXYGEN SATURATION: 99 % | RESPIRATION RATE: 16 BRPM | HEIGHT: 66 IN | HEART RATE: 76 BPM | DIASTOLIC BLOOD PRESSURE: 77 MMHG | TEMPERATURE: 98 F | SYSTOLIC BLOOD PRESSURE: 118 MMHG

## 2023-10-03 VITALS
RESPIRATION RATE: 18 BRPM | HEIGHT: 66 IN | HEART RATE: 75 BPM | DIASTOLIC BLOOD PRESSURE: 77 MMHG | OXYGEN SATURATION: 98 % | SYSTOLIC BLOOD PRESSURE: 105 MMHG | TEMPERATURE: 98 F

## 2023-10-03 PROCEDURE — 99283 EMERGENCY DEPT VISIT LOW MDM: CPT | Mod: 25

## 2023-10-03 RX ADMIN — Medication 500 MILLIGRAM(S): at 23:16

## 2023-10-03 NOTE — ED PROVIDER NOTE - NS_EDPROVIDERDISPOUSERTYPE_ED_A_ED
I have personally evaluated and examined the patient. The Attending was available to me as a supervising provider if needed. social work

## 2023-10-03 NOTE — ED PROVIDER NOTE - PHYSICAL EXAMINATION
Gen - WDWN, NAD, speaking in full sentences  Skin - no acute rash, intact   HEENT - AT/NC, no conjunctival injection or dc, neck supple and FROM, airway patent   CV - S1S2, R/R/R  Resp - CTAB, no focal r/r/w   MSK - w/w/p, no focal tenderness, fluctuance, streaking or crepitus, +SILT, palpable symmetric pulses b/l, full ROM of peripheral joints, no midline tenderness   Psych - euphoria, normal speech and eye contact, judgement/insight intact   Neuro - AxOx3, ambulatory with steady gait

## 2023-10-03 NOTE — ED ADULT NURSE NOTE - NS ED NOTE ABUSE SUSPICION NEGLECT YN
Shawn Chaney Patient Age: 44 year old  MESSAGE:   Patient calling back regarding CTA Chest results from yesterday  Fwd to triage to assist      WEIGHT AND HEIGHT:   Wt Readings from Last 1 Encounters:   03/26/21 (!) 137.2 kg (302 lb 6.4 oz)     Ht Readings from Last 1 Encounters:   03/26/21 6' 3\" (1.905 m)     BMI Readings from Last 1 Encounters:   03/26/21 37.80 kg/m²       ALLERGIES:  Patient has no known allergies.  Current Outpatient Medications   Medication   • atorvastatin (Lipitor) 10 MG tablet   • metoPROLOL tartrate (LOPRESSOR) 50 MG tablet   • rivaroxaban (Xarelto) 20 MG Tab     No current facility-administered medications for this visit.     PHARMACY to use: please ask           Pharmacy preference(s) on file:   [facility] #[id number] [address] [address]  Phone: [phone] Fax: [phone]      CALL BACK INFO: Ok to leave response (including medical information) with family member or on answering machine  ROUTING: Patient's physician/staff        PCP: Quintin Ruiz MD         INS: Payor: BLUE [employer]OSS [employer] / Plan: PPO [id number] / Product Type: PPO MISC   PATIENT ADDRESS:  [address] [place] [id number]   No

## 2023-10-03 NOTE — ED PROVIDER NOTE - OBJECTIVE STATEMENT
34 yo M with no known PMHx, high utilizer of ER, uncooperative on exam and history, frequent visit for "not feeling well" but refused to elaborate. Seen by myself earlier, returns asking for bathroom and food currently. Not forthcoming with drug use. States that I need to use the bathroom. No trauma, fall, fever, focal weakness, N/V/D/C, pain or urinary/bowel incontinence noted

## 2023-10-03 NOTE — ED ADULT NURSE NOTE - NSFALLUNIVINTERV_ED_ALL_ED
Bed/Stretcher in lowest position, wheels locked, appropriate side rails in place/Call bell, personal items and telephone in reach/Instruct patient to call for assistance before getting out of bed/chair/stretcher/Non-slip footwear applied when patient is off stretcher/Center to call system/Physically safe environment - no spills, clutter or unnecessary equipment/Purposeful proactive rounding/Room/bathroom lighting operational, light cord in reach

## 2023-10-03 NOTE — ED PROVIDER NOTE - CLINICAL SUMMARY MEDICAL DECISION MAKING FREE TEXT BOX
Pt is a high utilizer of ER for various vague complaints, seen by myself twice in the past 24 hours, medical screening exam has been performed.  Pt with no acute trauma or emergencies noted and exam wnl.  reports b/l feet pain, no s/s of trauma, infection or concerns for vascular pathology, ambulatory with steady gait, offered NSAID, hemodynamically stable and non toxic appearing, medically stable for dc. f/u instructions have been provided

## 2023-10-03 NOTE — ED PROVIDER NOTE - OBJECTIVE STATEMENT
32 yo M with no known PMHx, BIBA, high utilizer of ER, uncooperative on exam and history, frequent visit for "not feeling well" but refused to elaborate. Seen by myself earlier, returns asking for pain meds for his feet as well. No trauma, fall, fever, focal weakness, N/V/D/C, pain or urinary/bowel incontinence noted

## 2023-10-03 NOTE — ED PROVIDER NOTE - NSFOLLOWUPINSTRUCTIONS_ED_ALL_ED_FT
Follow up with your primary care doctor or clinics listed below if you do not have a doctor,    [address] [address]  To make an appointment, call [phone]    [facility]  Address: [facility] [address] [place] [address]  Appointment Center: [phone] ([phone])     [facility] LIFE NET is a good referral line for crisis and substance abuse help.  [facility] has drop in programs all over the city.    Return to the ER for Emergencies.  Return immediately for any new or worsening symptoms or any new concerns

## 2023-10-03 NOTE — ED PROVIDER NOTE - PATIENT PORTAL LINK FT
You can access the FollowMyHealth Patient Portal offered by [facility] by registering at the following website: [email or web address]. By joining [employer]’s FollowMyHealth portal, you will also be able to view your health information using other applications (apps) compatible with our system.

## 2023-10-03 NOTE — ED PROVIDER NOTE - PHYSICAL EXAMINATION
Gen - WDWN, NAD, speaking in full sentences  Skin - no acute rash, intact   HEENT - AT/NC, no conjunctival injection or dc, neck supple and FROM, airway patent   MSK - w/w/p, full ROM of peripheral joints, no midline tenderness   Psych - euphoria, normal speech and eye contact, judgement/insight intact   Neuro - AxOx3, ambulatory with steady gait

## 2023-10-03 NOTE — ED PROVIDER NOTE - PATIENT PORTAL LINK FT
You can access the FollowMyHealth Patient Portal offered by [facility] by registering at the following website: [email or web address]. By joining [employer]’s FollowMyHealth portal, you will also be able to view your health information using other applications (apps) compatible with our system.

## 2023-10-03 NOTE — ED PROVIDER NOTE - NSFOLLOWUPINSTRUCTIONS_ED_ALL_ED_FT
Follow up with your primary care doctor or clinics listed below if you do not have a doctor,    [address] [address]  To make an appointment, call [phone]    [facility]  Address: [facility] [address] [place] [address]  Appointment Center: [phone] ([phone])     [facility] LIFE NET is a good referral line for crisis and substance abuse help.  [facility] has drop in programs all over the city.    Return to the ER for Emergencies.  Return immediately for any new or worsening symptoms or any new concerns

## 2023-10-04 DIAGNOSIS — R53.1 WEAKNESS: ICD-10-CM

## 2023-10-04 DIAGNOSIS — R42 DIZZINESS AND GIDDINESS: ICD-10-CM

## 2023-10-05 DIAGNOSIS — R53.81 OTHER MALAISE: ICD-10-CM

## 2023-10-05 DIAGNOSIS — M79.671 PAIN IN RIGHT FOOT: ICD-10-CM

## 2023-10-05 DIAGNOSIS — F17.200 NICOTINE DEPENDENCE, UNSPECIFIED, UNCOMPLICATED: ICD-10-CM

## 2023-10-05 DIAGNOSIS — Z00.00 ENCOUNTER FOR GENERAL ADULT MEDICAL EXAMINATION WITHOUT ABNORMAL FINDINGS: ICD-10-CM

## 2023-10-05 DIAGNOSIS — M79.672 PAIN IN LEFT FOOT: ICD-10-CM

## 2023-10-17 ENCOUNTER — EMERGENCY (EMERGENCY)
Facility: HOSPITAL | Age: 33
LOS: 1 days | Discharge: LEFT BEFORE DISCHARGE | End: 2023-10-17
Attending: EMERGENCY MEDICINE | Admitting: EMERGENCY MEDICINE
Payer: MEDICAID

## 2023-10-17 VITALS
TEMPERATURE: 98 F | OXYGEN SATURATION: 95 % | HEART RATE: 78 BPM | HEIGHT: 66 IN | RESPIRATION RATE: 18 BRPM | SYSTOLIC BLOOD PRESSURE: 99 MMHG | DIASTOLIC BLOOD PRESSURE: 62 MMHG

## 2023-10-17 PROCEDURE — L9991: CPT

## 2023-10-17 NOTE — ED ADULT TRIAGE NOTE - CHIEF COMPLAINT QUOTE
PT BIBEMS from [facility] complaining stating "I'm just hurt, my body hurts." Pt denies chest pain and sob. Denies alcohol and drug use.

## 2023-10-19 DIAGNOSIS — Z53.21 PROCEDURE AND TREATMENT NOT CARRIED OUT DUE TO PATIENT LEAVING PRIOR TO BEING SEEN BY HEALTH CARE PROVIDER: ICD-10-CM

## 2023-10-19 DIAGNOSIS — M79.10 MYALGIA, UNSPECIFIED SITE: ICD-10-CM

## 2023-10-24 NOTE — ED ADULT TRIAGE NOTE - NS ED TRIAGE AVPU SCALE
Alert-The patient is alert, awake and responds to voice. The patient is oriented to time, place, and person. The triage nurse is able to obtain subjective information.
Labs

## 2023-12-04 NOTE — ED ADULT TRIAGE NOTE - PRO INTERPRETER NEED 2
Medication refill requested for insulin glargine (Lantus SoloStar) 100 UNIT/ML pen-injector   Last Refill/Prescribed: Date 03/21/2023, # of tablets: 15 mL, # of refills approved:1     Medication: insulin glargine (Lantus SoloStar) 100 UNIT/ML pen-injector   Last office visit date: 10/30/2023  Next appointment scheduled?: Yes   Number of refills given: 1   English

## 2023-12-19 ENCOUNTER — EMERGENCY (EMERGENCY)
Facility: HOSPITAL | Age: 33
LOS: 1 days | Discharge: HOME | End: 2023-12-19
Admitting: EMERGENCY MEDICINE
Payer: MEDICAID

## 2023-12-19 VITALS
TEMPERATURE: 98 F | SYSTOLIC BLOOD PRESSURE: 129 MMHG | OXYGEN SATURATION: 98 % | HEART RATE: 93 BPM | RESPIRATION RATE: 16 BRPM | DIASTOLIC BLOOD PRESSURE: 88 MMHG

## 2023-12-19 DIAGNOSIS — R51.9 HEADACHE, UNSPECIFIED: ICD-10-CM

## 2023-12-19 PROCEDURE — 99283 EMERGENCY DEPT VISIT LOW MDM: CPT | Mod: 25

## 2023-12-19 NOTE — ED ADULT TRIAGE NOTE - GLASGOW COMA SCALE: BEST MOTOR RESPONSE, MLM
(M6) obeys commands Consent 2/Introductory Paragraph: Mohs surgery was explained to the patient and consent was obtained. The risks, benefits and alternatives to therapy were discussed in detail. Specifically, the risks of infection, scarring, bleeding, prolonged wound healing, incomplete removal, allergy to anesthesia, nerve injury and recurrence were addressed. Prior to the procedure, the treatment site was clearly identified and confirmed by the patient. All components of Universal Protocol/PAUSE Rule completed.

## 2023-12-19 NOTE — ED PROVIDER NOTE - PATIENT PORTAL LINK FT
You can access the FollowMyHealth Patient Portal offered by [facility] by registering at the following website: [email or web address]. By joining [employer]’s FollowMyHealth portal, you will also be able to view your health information using other applications (apps) compatible with our system. You can access the FollowMyHealth Patient Portal offered by [facility] by registering at the following website: [email or web address]. By joining [employer]’s FollowMyHealth portal, you will also be able to view your health information using other applications (apps) compatible with our system.

## 2023-12-19 NOTE — ED ADULT TRIAGE NOTE - NS ED NURSE AMBULANCES
[facility]

## 2023-12-19 NOTE — ED ADULT NURSE NOTE - NSFALLUNIVINTERV_ED_ALL_ED
Bed/Stretcher in lowest position, wheels locked, appropriate side rails in place/Call bell, personal items and telephone in reach/Instruct patient to call for assistance before getting out of bed/chair/stretcher/Non-slip footwear applied when patient is off stretcher/[place] to call system/Physically safe environment - no spills, clutter or unnecessary equipment/Purposeful proactive rounding/Room/bathroom lighting operational, light cord in reach Bed/Stretcher in lowest position, wheels locked, appropriate side rails in place/Call bell, personal items and telephone in reach/Instruct patient to call for assistance before getting out of bed/chair/stretcher/Non-slip footwear applied when patient is off stretcher/[place] to call system/Physically safe environment - no spills, clutter or unnecessary equipment/Purposeful proactive rounding/Room/bathroom lighting operational, light cord in reach

## 2023-12-19 NOTE — ED PROVIDER NOTE - CLINICAL SUMMARY MEDICAL DECISION MAKING FREE TEXT BOX
well appearing here with not maximal onset headache gradual onset w/o provoking or modifying factors, no focal deficits, and pt is neurovascular intact in the ed, nsaids, dc

## 2023-12-19 NOTE — ED PROVIDER NOTE - PHYSICAL EXAMINATION
Physical Exam    Vital Signs: I have reviewed the initial vital signs.  Constitutional: well-appearing, appears stated age  Eyes: PERRLA, EOM intact, RAPD absent, and symmetrical lids.  ENT: neck supple with no adenopathy, moist MM.  Cardiovascular: +S1/S2, no murmurs, regular rate, regular rhythm, well-perfused extremities  Respiratory: unlabored respiratory effort, clear to auscultation bilaterally, speaks in full sentences  Gastrointestinal: soft, non-tender abdomen, no pulsatile mass  Neurologic: awake, alert, cranial nerves II-XII grossly intact, extremities’ motor and sensory functions grossly intact

## 2023-12-19 NOTE — ED PROVIDER NOTE - OBJECTIVE STATEMENT
34 yo m pw gradual onset not maximal onset frontal temporal headache with no photophobia or phonophobia, not associated with any n/v. No ams or focal deficits.    I have reviewed available current nursing and previous documentation of past medical, surgical, family, and/or social history.

## 2023-12-19 NOTE — ED ADULT TRIAGE NOTE - CHIEF COMPLAINT QUOTE
Pt BIBA c/o 3/10 headache x2 days. Pt denies any other complaints at this time. Pt aox4, speech clear, Pt ambulatory w/ steady gait.

## 2023-12-27 ENCOUNTER — EMERGENCY (EMERGENCY)
Facility: HOSPITAL | Age: 33
LOS: 1 days | Discharge: HOME | End: 2023-12-27
Attending: EMERGENCY MEDICINE | Admitting: EMERGENCY MEDICINE
Payer: MEDICAID

## 2023-12-27 VITALS
TEMPERATURE: 98 F | DIASTOLIC BLOOD PRESSURE: 84 MMHG | OXYGEN SATURATION: 99 % | HEART RATE: 87 BPM | RESPIRATION RATE: 16 BRPM | SYSTOLIC BLOOD PRESSURE: 122 MMHG

## 2023-12-27 DIAGNOSIS — R51.9 HEADACHE, UNSPECIFIED: ICD-10-CM

## 2023-12-27 DIAGNOSIS — Z59.00 HOMELESSNESS UNSPECIFIED: ICD-10-CM

## 2023-12-27 DIAGNOSIS — Z01.89 ENCOUNTER FOR OTHER SPECIFIED SPECIAL EXAMINATIONS: ICD-10-CM

## 2023-12-27 PROCEDURE — 99283 EMERGENCY DEPT VISIT LOW MDM: CPT

## 2023-12-27 RX ORDER — ACETAMINOPHEN 500 MG
650 TABLET ORAL ONCE
Refills: 0 | Status: COMPLETED | OUTPATIENT
Start: 2023-12-27 | End: 2023-12-27

## 2023-12-27 RX ADMIN — Medication 650 MILLIGRAM(S): at 22:07

## 2023-12-27 SDOH — ECONOMIC STABILITY - HOUSING INSECURITY: HOMELESSNESS UNSPECIFIED: Z59.00

## 2023-12-27 NOTE — ED ADULT NURSE NOTE - NSFALLUNIVINTERV_ED_ALL_ED
Bed/Stretcher in lowest position, wheels locked, appropriate side rails in place/Call bell, personal items and telephone in reach/Instruct patient to call for assistance before getting out of bed/chair/stretcher/Non-slip footwear applied when patient is off stretcher/[place] to call system/Physically safe environment - no spills, clutter or unnecessary equipment/Purposeful proactive rounding/Room/bathroom lighting operational, light cord in reach Bed/Stretcher in lowest position, wheels locked, appropriate side rails in place/Call bell, personal items and telephone in reach/Instruct patient to call for assistance before getting out of bed/chair/stretcher/Non-slip footwear applied when patient is off stretcher/[place] to call system/Physically safe environment - no spills, clutter or unnecessary equipment/Purposeful proactive rounding/Room/bathroom lighting operational, light cord in reach

## 2023-12-27 NOTE — ED PROVIDER NOTE - PATIENT PORTAL LINK FT
You can access the FollowMyHealth Patient Portal offered by [facility] by registering at the following website: [email or web address]. By joining [employer]’s FollowMyHealth portal, you will also be able to view your health information using other applications (apps) compatible with our system. You can access the FollowMyHealth Patient Portal offered by [facility] by registering at the following website: [email or web address]. By joining [email or web address]’s FollowMyHealth portal, you will also be able to view your health information using other applications (apps) compatible with our system.

## 2023-12-27 NOTE — ED PROVIDER NOTE - OBJECTIVE STATEMENT
32 yo male pt, undomiciled, presents for headache, but otherwise not providing much hx at this time. old chart review shows many visits to ED for miscelaneous complains. 34 yo male pt, undomiciled, presents for headache, but otherwise not providing much hx at this time. old chart review shows many visits to ED for miscelaneous complains.

## 2023-12-27 NOTE — ED PROVIDER NOTE - CLINICAL SUMMARY MEDICAL DECISION MAKING FREE TEXT BOX
will give tylenol for slight headache. medical screening examination shows no emergent medical conditions.

## 2023-12-27 NOTE — ED ADULT TRIAGE NOTE - CHIEF COMPLAINT QUOTE
Pt undomiciled BIBA from street states "everything hurts" pt refusing to specify medical complaints further. Pt aox4, speech clear, Pt ambulatory w/ steady gait.

## 2024-02-05 NOTE — BH INPATIENT PSYCHIATRY PROGRESS NOTE - NSTXPSYCHODATEEST_PSY_ALL_CORE
12-Apr-2023
04-Apr-2023
12-Apr-2023
31-Mar-2023
05-Apr-2023
31-Mar-2023
done
05-Apr-2023
04-Apr-2023
31-Mar-2023
05-Apr-2023
12-Apr-2023
12-Apr-2023
05-Apr-2023
31-Mar-2023
05-Apr-2023
12-Apr-2023
12-Apr-2023

## 2024-02-28 ENCOUNTER — EMERGENCY (EMERGENCY)
Facility: HOSPITAL | Age: 34
LOS: 1 days | Discharge: LEFT BEFORE DISCHARGE | End: 2024-02-28
Attending: EMERGENCY MEDICINE | Admitting: EMERGENCY MEDICINE
Payer: MEDICAID

## 2024-02-28 VITALS
HEART RATE: 98 BPM | SYSTOLIC BLOOD PRESSURE: 111 MMHG | RESPIRATION RATE: 18 BRPM | TEMPERATURE: 98 F | OXYGEN SATURATION: 98 % | DIASTOLIC BLOOD PRESSURE: 74 MMHG

## 2024-02-28 DIAGNOSIS — Z01.89 ENCOUNTER FOR OTHER SPECIFIED SPECIAL EXAMINATIONS: ICD-10-CM

## 2024-02-28 DIAGNOSIS — Z53.21 PROCEDURE AND TREATMENT NOT CARRIED OUT DUE TO PATIENT LEAVING PRIOR TO BEING SEEN BY HEALTH CARE PROVIDER: ICD-10-CM

## 2024-02-28 PROCEDURE — L9991: CPT

## 2024-03-06 NOTE — ED ADULT TRIAGE NOTE - CCCP TRG CHIEF CMPLNT
Patient calls asking for these two medications. He says he has not had the Colchicine for a while but he does need it.     The Allopurinol , he is asking why it was refused?     Call back number [phone]  
body pain

## 2024-03-08 ENCOUNTER — EMERGENCY (EMERGENCY)
Facility: HOSPITAL | Age: 34
LOS: 1 days | Discharge: LEFT BEFORE DISCHARGE | End: 2024-03-08
Admitting: EMERGENCY MEDICINE
Payer: SELF-PAY

## 2024-03-08 ENCOUNTER — EMERGENCY (EMERGENCY)
Facility: HOSPITAL | Age: 34
LOS: 1 days | Discharge: HOME | End: 2024-03-08
Attending: EMERGENCY MEDICINE | Admitting: EMERGENCY MEDICINE
Payer: MEDICAID

## 2024-03-08 VITALS
DIASTOLIC BLOOD PRESSURE: 71 MMHG | RESPIRATION RATE: 16 BRPM | SYSTOLIC BLOOD PRESSURE: 104 MMHG | TEMPERATURE: 98 F | OXYGEN SATURATION: 98 % | HEART RATE: 88 BPM

## 2024-03-08 VITALS
RESPIRATION RATE: 16 BRPM | TEMPERATURE: 99 F | HEART RATE: 88 BPM | SYSTOLIC BLOOD PRESSURE: 124 MMHG | DIASTOLIC BLOOD PRESSURE: 82 MMHG | OXYGEN SATURATION: 98 %

## 2024-03-08 DIAGNOSIS — Z59.00 HOMELESSNESS UNSPECIFIED: ICD-10-CM

## 2024-03-08 DIAGNOSIS — Z53.21 PROCEDURE AND TREATMENT NOT CARRIED OUT DUE TO PATIENT LEAVING PRIOR TO BEING SEEN BY HEALTH CARE PROVIDER: ICD-10-CM

## 2024-03-08 DIAGNOSIS — R52 PAIN, UNSPECIFIED: ICD-10-CM

## 2024-03-08 PROCEDURE — 99283 EMERGENCY DEPT VISIT LOW MDM: CPT | Mod: 25

## 2024-03-08 PROCEDURE — L9991: CPT

## 2024-03-08 SDOH — ECONOMIC STABILITY - HOUSING INSECURITY: HOMELESSNESS UNSPECIFIED: Z59.00

## 2024-03-08 NOTE — ED ADULT TRIAGE NOTE - CHIEF COMPLAINT QUOTE
Pt undomiciled BIBA states "my body hurts I guess" pt denies any recent injuries, and refusing to further specify medical complaints. Pt aox4, speech clear, Pt ambulatory w/ steady gait.

## 2024-03-08 NOTE — ED ADULT TRIAGE NOTE - CHIEF COMPLAINT QUOTE
Pt undomiciled BIBA states "I just don't feel good I guess" Pt refusing to further specify medical complaints. Pt aox4, speech clear, Pt ambulatory w/ steady gait.

## 2024-03-08 NOTE — ED PROVIDER NOTE - PHYSICAL EXAMINATION
PE: A&Ox3, mildly unkempt, NAD, NCAT, regular respiratory effort, moving all four extremities equally.

## 2024-03-08 NOTE — ED PROVIDER NOTE - NSFOLLOWUPINSTRUCTIONS_ED_ALL_ED_FT
24-Hour Drop-In Centers For Adults:  [facility] (18+) - [address] [place] [address] ([address]: #[address] to [address]    [facility] (25+) - [address] [facility] [address] ([address]: [address])    [facility] (18+) - [address] [facility] ([address]: A to [facility])    Drop-In Centers for Adults:  [facility] (18+) - [address] ([facility] - Subway: 1/2/3/A/C/E to [address])    PLEASE FOLLOW-UP WITH YOUR PRIMARY CARE DOCTOR IN 1-2 DAYS FOR FURTHER EVALUATION.      PLEASE TAKE ALL PAPERWORK FROM TODAY'S VISIT TO YOUR PRIMARY DOCTOR.     IF YOU DO NOT HAVE A PRIMARY CARE DOCTOR PLEASE REFER TO THE OFFICE/CLINIC INFORMATION GIVEN BELOW:    If you do not have a doctor, you can call our referral line to find a doctor that matches your insurance; the number is [phone].     You can also follow up with clinics listed below, if you do not have a doctor:  [place] [address]  To make an appointment, call [phone]    [facility]  Address: [address] [place] [address]  Appointment Center: [phone] ([phone])     PLEASE RETURN TO THE ER IMMEDIATELY OR CALL [phone] ANY HIGH FEVER, CHEST PAIN, TROUBLE BREATHING, VOMITING, SEVERE PAIN, OR ANY OTHER CONCERNS.    To access your record on the patient portal [facility], please visit:  [email or web address]  If you are having difficulties setting this up, call [phone] and someone can assist you over the phone.

## 2024-03-08 NOTE — ED PROVIDER NOTE - PATIENT PORTAL LINK FT
You can access the FollowMyHealth Patient Portal offered by [facility] by registering at the following website: [email or web address]. By joining [employer]’s FollowMyHealth portal, you will also be able to view your health information using other applications (apps) compatible with our system.

## 2024-03-08 NOTE — ED ADULT TRIAGE NOTE - TEMPERATURE IN CELSIUS (DEGREES C)
37.1 Terbinafine Counseling: Patient counseling regarding adverse effects of terbinafine including but not limited to headache, diarrhea, rash, upset stomach, liver function test abnormalities, itching, taste/smell disturbance, nausea, abdominal pain, and flatulence.  There is a rare possibility of liver failure that can occur when taking terbinafine.  The patient understands that a baseline LFT and kidney function test may be required. The patient verbalized understanding of the proper use and possible adverse effects of terbinafine.  All of the patient's questions and concerns were addressed.

## 2024-03-08 NOTE — ED PROVIDER NOTE - OBJECTIVE STATEMENT
33M denies past medical history  Pt stated he was here for "pain" in triage, but upon further questioning, patient states that he just wants a place to rest and something to eat. Denies medical complaints.

## 2024-03-08 NOTE — ED ADULT NURSE NOTE - SUICIDE SCREENING DEPRESSION
Negative Is This A New Presentation, Or A Follow-Up?: Skin Lesions What Type Of Note Output Would You Prefer (Optional)?: Bullet Format How Severe Is Your Skin Lesion?: mild Has Your Skin Lesion Been Treated?: not been treated

## 2024-03-08 NOTE — ED ADULT NURSE REASSESSMENT NOTE - NS ED NURSE REASSESS COMMENT FT1
pt provided w/ sandwich and d/c papers. Pt refusing to leave, states "I cant walk". Escorted out by security, ambulatory w/ steady gait.

## 2024-03-11 DIAGNOSIS — R53.83 OTHER FATIGUE: ICD-10-CM

## 2024-03-11 DIAGNOSIS — Z59.00 HOMELESSNESS UNSPECIFIED: ICD-10-CM

## 2024-03-11 SDOH — ECONOMIC STABILITY - HOUSING INSECURITY: HOMELESSNESS UNSPECIFIED: Z59.00

## 2024-03-11 NOTE — ED ADULT TRIAGE NOTE - ARRIVAL FROM
Street
[FreeTextEntry5] : Offered support/encouragement, psychoeducation, counseling regarding: diagnosis/diagnoses, medications, symptoms, recommendations etc.  Reviewed/discussed plan below:  -Continue Prozac 15 mg daily for now  -Reviewed R/B/SE of Auvelity for depression and crying spells as cannot tolerate higher dosage of Prozac -Continue Bupropion  mg daily while awaiting Auvelity fill; (aware that she made need PA/approval) -Began ASQ to evaluate concerns about possible autism spectrum diagnosis and may do other screeners as well if findings not seeming helpful; (discussed how [facility] does not do autism testing) -Hasn't needed melatonin or Alteril for insomnia as sleeping well per pt report -Continue exercise for health and weight loss; (reports having lost 10 lbs)  -To continue Trauma Group Wednesdays at 1 pm which is resuming in mid-March 2024  -Continuing with Miriam Gomez from Nutrition and to start nutrition program and weight loss medication Completed SOS-10 on 1/3/24 -Completed AUDIT 1/3/24 (does not drink)  -Pt will continue therapy with outside provider Treasure Santiago at [employer] [facility] -Pt may reach out to writer prn  1/3/24: SOS-10: 44  Sees therapist Treasure Santiago :at [address] [place] [address]; [employer]: [phone]   40 minutes spent reviewing prior records/notes, referring via task to trauma group. seeing patient and recording session note

## 2024-03-29 NOTE — BH INPATIENT PSYCHIATRY ASSESSMENT NOTE - OTHER PAST PSYCHIATRIC HISTORY (INCLUDE DETAILS REGARDING ONSET, COURSE OF ILLNESS, INPATIENT/OUTPATIENT TREATMENT)
inpatient admissions at [facility] (2022), [facility] (for 204 days in 4033-6937), [place] (2021, 2020), [facility] (2021, 2020)  hx of residing at [facility]
(1) Other Diagnosis

## 2024-05-09 NOTE — BH INPATIENT PSYCHIATRY PROGRESS NOTE - NSBHASSESSSUMMFT_PSY_ALL_CORE
denies pain/discomfort (Rating = 0) Patient is a 32-year-old male; unclear current social hx, hx of homelessness; PPHx schizophrenia, per PSYCKES of various dx (mood, psychotic, anxiety, adjustment, substance disorders), prior admissions, denies hx of suicidality or violence; no known PMHx; BIB EMS; psychiatry consulted for evaluation; transferred to [place] [address] for ongoing psychiatric care.     Working diagnosis of schizophrenia spectrum illness per history including schizophrenia, schizoaffective disorder, substance-induced mood/psychotic disorder; R/O personality disorder.    Currently, patient reports some paranoia and thought confusion, denying all other psychotic symptoms. Of note, pt with multiple other MRNs in [place] and 3 ED visits in 24 hours. Assessment limited as pt is a limited historian and minimally engaged.  Pt with hx of multiple prior inpatient admissions but also hx of malingering for food/shelter. Currently denies any thoughts of self harm, passive or active SI, or HI. No known recent substance use, utox negative and blood alcohol negative. Start risperidone, plan to titrate.    Continued inpatient admission required for safety, stabilization, medication optimization, safe discharge planning.      Plan  1. Legal: Admitted on 9.27 status  2. Safety: No reported SI/SIB/HI/VI currently on unit; continue routine observation.   -psychotropic PRN medications for safety  3. Psychiatric:   -risperidone 1mg daily and  4 mg qHS for psychosis; plan to titrate as tolerated; R/B/A and side effects discussed  -Initiate Depakote ER 500mg qhs  -PRN melatonin 3 mg qHS for insomnia  4. Therapy: group & milieu therapy as appropriate  5. Medical: No acute medical needs identified  -CBC & CMP WNL, TSH 0.63, utox negative, blood alcohol negative  6. Collateral: ongoing  7. Disposition: When stable

## 2024-05-22 NOTE — BH DISCHARGE NOTE NURSING/SOCIAL WORK/PSYCH REHAB - OTHER MODE OF TRANSPORTATION
0500 KATEY Palacios informed .    0504  [facility] informed, form signed.    [phone]  Received a call from [employer], released from tissue and eye donation.     Pt will be transported via [employer] through [employer] at 11AM [phone]  Invoice #: [phone] scheduled with Barb

## 2024-12-09 NOTE — PROGRESS NOTE BEHAVIORAL HEALTH - AXIS III
----- Message from Greta Gomez PA-C sent at 12/6/2024  3:33 PM CST -----  Regarding: Labs  Please let her know that I reviewed her labs. [facility] did not do an A1c unfortunately. We will need one before her next visit with me in May so I put in orders for her to complete before her appointment. Everything else looked great! Please schedule labs appointment.   unable to assess

## 2025-05-13 NOTE — ED ADULT NURSE NOTE - CADM POA CENTRAL LINE
New patient is scheduled for appointment with Dr Encarnacion for 7/22/25 and on the wait list.    Patient is unable to stop taking Xyzal and Flonase prior to appointment      
Spoke with patient. Verified name and date of birth. Informed patient Dr. Encarnacion will be happy to see her at her appointment in July 2025 and if she needs to stay on Allergy medication may do so and will not be able to test her. Patient verbalizes understanding.  
No

## 2025-05-24 NOTE — ED ADULT NURSE NOTE - CAS ELECT INFOMATION PROVIDED
Pt discharged home with parent/guardian.Pt acting age appropriately, respirations regular and unlabored, cap refill less than two seconds. Skin pink, dry and warm. Lungs clear bilaterally. No further complaints at this time. Parent/guardian verbalized understanding of discharge paperwork and has no further questions at this time.    Education provided about continuation of care, follow up care with PCP as needed and medication administration: prescriptions provided. Parent/guardian able to provided teach back about discharge instructions.    
DC instructions

## 2025-07-14 NOTE — ED PROVIDER NOTE - INTERNATIONAL TRAVEL
No Writer met individually with Pt to complete intake assessment.    Pt is cooperative with assessment, but very emotional. Explains that he is here because \"in one fell swoop, I've lost everything: my wife, my kids, my job, my home, my identity\". Pt shares that a little over a month ago, his wife informed him that she no longer wishes to remain  to him. Pt reports that this came from out of the blue and was totally unexpected. Pt reports that since that time, he has had some very dark thoughts. Pt's children are not his biological children, but he has been in their life since they were young and they are now adults. 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 Pt tells that he goes to bed thinking about how to kill himself and wakes up thinking about how to kill himself. Pt reports that a day or so ago, he put on black clothing and went by the [place]amp [place] the [place] and sat in the weeds for at least 2 hours with the plan to jump in front of a semi. Pt reports that the thing that prevented him from carrying this out was the thought that the  of the semi is just doing their job and he worried about how they would process the situation. Pt tells that this evening, he got a ladder from their basement and was bringing up to their apartment (they live above the bar) and was planning to use the ladder to reach the highest point of their roof and was planning to jump off. His wife contacted the authorities, who brought him here.    Pt reports 1 previous suicide attempt at 21yo via cutting his wrist. He received no treatment from that incident. Pt reports a lengthy struggle with depression, but notes that he saw Dr. Rodríguez a few years ago who put him on medication that worked quite well for him. He notes that his PCP currently prescribes this until he can see Dr. Rodríguez again in August. Pt reports having seen  therapists in the past, but none recently. Pt denies previous psychiatric hospitalization. Pt does report heavy alcohol consumption. Pt states that he and his wife together will regularly consume 12 shots of Whiskey everyday. Pt tells that he did not have any periods of sobriety until his wife left him. He reports now he has had several days where he has been sober \"and I kind of liked it\". He does, however, report that the last few days he has been using alcohol as a coping mechanism. He reports to drinking \"the equivalent of a 12 pack of beer\" today. Pt denies any issues with withdrawal while abstaining.    Discussed inpatient hospitalization at length. Pt asked if this is what writer recommended for him and writer affirmed that it is. Pt states \"then let's do it, I don't want to feel like this anymore. This isn't me\". Pt is motivated to feel better. He is agreeable to voluntary hospitalization.    EDRN and EDMD updated with POC.